# Patient Record
Sex: FEMALE | Race: WHITE | Employment: PART TIME | URBAN - METROPOLITAN AREA
[De-identification: names, ages, dates, MRNs, and addresses within clinical notes are randomized per-mention and may not be internally consistent; named-entity substitution may affect disease eponyms.]

---

## 2017-07-06 ENCOUNTER — OFFICE VISIT (OUTPATIENT)
Dept: ORTHOPEDICS | Facility: CLINIC | Age: 57
End: 2017-07-06

## 2017-07-06 VITALS — BODY MASS INDEX: 24.71 KG/M2 | HEIGHT: 68 IN | RESPIRATION RATE: 16 BRPM | WEIGHT: 163 LBS

## 2017-07-06 DIAGNOSIS — M72.2 PLANTAR FASCIITIS OF RIGHT FOOT: Primary | ICD-10-CM

## 2017-07-06 NOTE — LETTER
7/6/2017      RE: Barby Jensen  [address] [place] [id number]        Subjective:   Barby Jensen is a 57 year old female who is here complaining of   right plantar foot pain for 5 months.  Pain is sharp at the medial heel and initially bad in the am and improved, but she has continued to be active on it and now her symptoms are progressively worse until she has had a tough time putting any weight on the heel and has changed her gait.    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    Background:   Date of injury: NA   Duration of symptoms: 5 months  Mechanism of Injury: Insidious Onset; Unknown   Aggravating factors: standing, walking, sitting to walking  Relieving Factors: ice and NSAIDs  Prior Evaluation: None    PAST MEDICAL, SOCIAL, SURGICAL AND FAMILY HISTORY: She  has a past medical history of Acne rosacea; ASCUS on Pap smear (08/06); and Reactive airway disease.  She  has a past surgical history that includes Excise mass upper extremity (10/09) and Arthroscopy knee bilateral (Bilateral, 9/4/2015).  Her family history includes Arrhythmia in her mother; Arthritis in her father; Depression in her paternal grandmother; Eye Disorder in her father; GASTROINTESTINAL DISEASE in her mother; HEART DISEASE in her mother; Hearing Loss in her father and mother; Heart Failure in her mother; Hypertension in her mother; Osteopenia in her father and mother; Prostate Cancer in her father.  She reports that she has never smoked. She has never used smokeless tobacco. She reports that she drinks alcohol. She reports that she does not use illicit drugs.    ALLERGIES: She has No Known Allergies.    CURRENT MEDICATIONS: She has a current medication list which includes the following prescription(s): estrogen  "(conjugated)-medroxyprogesterone, escitalopram, acetaminophen, metronidazole, sulfacetamide sodium (acne), ibuprofen, multiple vitamin, glucosamine-chondroit-vit c-mn, calcium citrate-vitamin d, and azithromycin.     REVIEW OF SYSTEMS: no numbness or tingling in the heel, no swelling, no trauma     Exam:   Resp 16  Ht 5' 7.9\" (1.725 m)  Wt 163 lb (73.9 kg)  BMI 24.86 kg/m2       CONSTITUTIONAL: healthy, alert and no distress  HEAD: Normocephalic. No masses, lesions, tenderness or abnormalities  SKIN: no suspicious lesions or rashes  GAIT: normal  NEUROLOGIC: Non-focal  PSYCHIATRIC: affect normal/bright and mentation appears normal.    MUSCULOSKELETAL:   R foot  --more of a cavus foot with nl pronation, antalgic gait on R.  --plantar fascia palpated with windlass maneuver and tender from the mid plantar fascia to insertion on the heel. Neg squeeze test at the heel. Perhaps mild tenderness at the midtendon of the achilles  AROM ankle symmetric to L   5/5 inversion, 5/5 eversion, 5/5 dorsiflexion, 5/5 plantarflexion, all without pain  Nl cap refill and sensation  Neg tinnels at medial ankle     Assessment/Plan:   R Plantar Fasciitis    Plan  --given degree of discomfort and difficulty with modifying her environmnent, I recommended a short cam boot to use for a week, removing for ROM 2x daily, as well as ice and relative rest.  -- we discussed not going barefoot in the house.  -- ice, nsaids, acetaminophen  -- heel pad to add in place of 3/4 length orthotic  -- work restriction for sitting 20 min in an hour  -- once pain improved for weightbearing with normal function, estimate 1 week, can start intrinsic foot strengthening exercises  -- could cosider night splints  -- we discussed injection would be for temp pain relief with risk of fat pad atrophy of the heel and I would recommend only if modifying stresses on plantar fascia and not improving.    Denny Stanley MD CAQ    "

## 2017-07-06 NOTE — PROGRESS NOTES
Subjective:   Barby Jensen is a 57 year old female who is here complaining of   right plantar foot pain for 5 months.  Pain is sharp at the medial heel and initially bad in the am and improved, but she has continued to be active on it and now her symptoms are progressively worse until she has had a tough time putting any weight on the heel and has changed her gait.    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    Background:   Date of injury: NA   Duration of symptoms: 5 months  Mechanism of Injury: Insidious Onset; Unknown   Aggravating factors: standing, walking, sitting to walking  Relieving Factors: ice and NSAIDs  Prior Evaluation: None    PAST MEDICAL, SOCIAL, SURGICAL AND FAMILY HISTORY: She  has a past medical history of Acne rosacea; ASCUS on Pap smear (08/06); and Reactive airway disease.  She  has a past surgical history that includes Excise mass upper extremity (10/09) and Arthroscopy knee bilateral (Bilateral, 9/4/2015).  Her family history includes Arrhythmia in her mother; Arthritis in her father; Depression in her paternal grandmother; Eye Disorder in her father; GASTROINTESTINAL DISEASE in her mother; HEART DISEASE in her mother; Hearing Loss in her father and mother; Heart Failure in her mother; Hypertension in her mother; Osteopenia in her father and mother; Prostate Cancer in her father.  She reports that she has never smoked. She has never used smokeless tobacco. She reports that she drinks alcohol. She reports that she does not use illicit drugs.    ALLERGIES: She has No Known Allergies.    CURRENT MEDICATIONS: She has a current medication list which includes the following prescription(s): estrogen (conjugated)-medroxyprogesterone, escitalopram, acetaminophen, metronidazole, sulfacetamide sodium (acne),  "ibuprofen, multiple vitamin, glucosamine-chondroit-vit c-mn, calcium citrate-vitamin d, and azithromycin.     REVIEW OF SYSTEMS: no numbness or tingling in the heel, no swelling, no trauma     Exam:   Resp 16  Ht 5' 7.9\" (1.725 m)  Wt 163 lb (73.9 kg)  BMI 24.86 kg/m2       CONSTITUTIONAL: healthy, alert and no distress  HEAD: Normocephalic. No masses, lesions, tenderness or abnormalities  SKIN: no suspicious lesions or rashes  GAIT: normal  NEUROLOGIC: Non-focal  PSYCHIATRIC: affect normal/bright and mentation appears normal.    MUSCULOSKELETAL:   R foot  --more of a cavus foot with nl pronation, antalgic gait on R.  --plantar fascia palpated with windlass maneuver and tender from the mid plantar fascia to insertion on the heel. Neg squeeze test at the heel. Perhaps mild tenderness at the midtendon of the achilles  AROM ankle symmetric to L   5/5 inversion, 5/5 eversion, 5/5 dorsiflexion, 5/5 plantarflexion, all without pain  Nl cap refill and sensation  Neg tinnels at medial ankle     Assessment/Plan:   R Plantar Fasciitis    Plan  --given degree of discomfort and difficulty with modifying her environmnent, I recommended a short cam boot to use for a week, removing for ROM 2x daily, as well as ice and relative rest.  -- we discussed not going barefoot in the house.  -- ice, nsaids, acetaminophen  -- heel pad to add in place of 3/4 length orthotic  -- work restriction for sitting 20 min in an hour  -- once pain improved for weightbearing with normal function, estimate 1 week, can start intrinsic foot strengthening exercises  -- could cosider night splints  -- we discussed injection would be for temp pain relief with risk of fat pad atrophy of the heel and I would recommend only if modifying stresses on plantar fascia and not improving.    Denny Stanley MD CAQ      "

## 2017-07-06 NOTE — MR AVS SNAPSHOT
After Visit Summary   7/6/2017    Barby Jensen    MRN: [id number]           Patient Information     Date Of Birth          1960        Visit Information        Provider Department      7/6/2017 11:15 AM Denny Stanley MD [facility]        Care Instructions    Plantar Fasciitis  ---this is not an inflammatory condition  --extend the toes and massage the plantar fascia can help pain  --cross train with swimming and biking  --a heel lift or orthotic can help relieve pain  --shoes with minimal support such as flip flops can agrevate the pain  --do not go barefoot in the house     Exercises  --heel cord stretches  --towel exercise--crumple a dishtowel under the feet 3 sets of 10 --5 x a week  --push big toe down while raising 2nd-5th toes hold 10 sec and repeat 3 sets of 10      Denny Stanley MD CAQ            Follow-ups after your visit        Who to contact     Please call your clinic at [phone] to:    Ask questions about your health    Make or cancel appointments    Discuss your medicines    Learn about your test results    Speak to your doctor   If you have compliments or concerns about an experience at your clinic, or if you wish to file a complaint, please contact [facility] Patient Relations at [phone] or email us at [email or web address]         Additional Information About Your Visit        MyChart Information     [employer] gives you secure access to your electronic health record. If you see a primary care provider, you can also send messages to your care team and make appointments. If you have questions, please call your primary care clinic.  If you do not have a primary care provider, please call [phone] and they will assist you.      [employer] is an electronic gateway that provides easy, online access to your medical records. With [employer], you can request a clinic appointment, read your test results, renew a  "prescription or communicate with your care team.     To access your existing account, please contact your [facility] or call [phone] for assistance.        Care EveryWhere ID     This is your Care EveryWhere ID. This could be used by other organizations to access your [place] medical records  [id number]        Your Vitals Were     Respirations Height BMI (Body Mass Index)             16 5' 7.9\" (1.725 m) 24.86 kg/m2          Blood Pressure from Last 3 Encounters:   10/26/16 121/74   09/12/16 106/72   09/04/15 133/71    Weight from Last 3 Encounters:   07/06/17 163 lb (73.9 kg)   10/26/16 162 lb (73.5 kg)   09/12/16 159 lb (72.1 kg)              Today, you had the following     No orders found for display       Primary Care Provider Office Phone # Fax #    Eryn Tessy Caro [phone]       [place] [address]        Equal Access to Services     DEYA ROBERTSON : Hadii micheal ku hadasho Soomaali, waaxda luqadaha, qaybta kaalmada adeegyada, alexa haskins . So [facility] [phone].    ATENCIÓN: Si habla español, tiene a robertson disposición servicios gratuitos de asistencia lingüística. Llame al [phone].    We comply with applicable federal civil rights laws and [place] laws. We do not discriminate on the basis of race, color, national origin, age, disability sex, sexual orientation or gender identity.            Thank you!     Thank you for choosing [facility]  for your care. Our goal is always to provide you with excellent care. Hearing back from our patients is one way we can continue to improve our services. Please take a few minutes to complete the written survey that you may receive in the mail after your visit with us. Thank you!             Your Updated Medication List - Protect others around you: Learn how to safely use, store and throw away your medicines at " www.disposemymeds.org.          This list is accurate as of: 7/6/17 11:50 AM.  Always use your most recent med list.                   Brand Name Dispense Instructions for use Diagnosis    acetaminophen 500 MG tablet    TYLENOL     Take 500-1,000 mg by mouth every 6 hours as needed for mild pain        azithromycin 250 MG tablet    ZITHROMAX    6 tablet    Two tablets first day, then one tablet daily for four days.    Cough, Throat pain       CALCIUM + D PO      Take 1 tablet by mouth daily    Rosacea       escitalopram 10 MG tablet    LEXAPRO    90 tablet    Take 1 tablet (10 mg) by mouth daily    DEAN (generalized anxiety disorder)       estrogen (conjugated)-medroxyPROGESTERone 0.3-1.5 MG per tablet    PREMPRO    90 tablet    Take 1 tablet by mouth daily    Menopausal syndrome (hot flashes)       GLUCOSAMINE 1500 COMPLEX PO      Take by mouth daily    Rosacea       IBUPROFEN PO      Take 200 mg by mouth as needed for moderate pain        metroNIDAZOLE 1 % gel    METROGEL    60 g    Apply topically daily    Rosacea       MV-ONE PO      Take 1 tablet by mouth daily    Rosacea       sulfacetamide sodium (Acne) 10 % lotion    KLARON    118 mL    Externally apply 1 applicator topically daily    Rosacea

## 2017-07-06 NOTE — PATIENT INSTRUCTIONS
Plantar Fasciitis  ---this is not an inflammatory condition  --extend the toes and massage the plantar fascia can help pain  --cross train with swimming and biking  --a heel lift or orthotic can help relieve pain  --shoes with minimal support such as flip flops can agrevate the pain  --do not go barefoot in the house     Exercises  --heel cord stretches  --towel exercise--crumple a dishtowel under the feet 3 sets of 10 --5 x a week  --push big toe down while raising 2nd-5th toes hold 10 sec and repeat 3 sets of 10      Denny Stanley MD CAQ

## 2017-07-11 DIAGNOSIS — F41.1 GAD (GENERALIZED ANXIETY DISORDER): ICD-10-CM

## 2017-07-11 RX ORDER — ESCITALOPRAM OXALATE 10 MG/1
10 TABLET ORAL DAILY
Qty: 30 TABLET | Refills: 0 | Status: SHIPPED | OUTPATIENT
Start: 2017-07-11 | End: 2017-08-23

## 2017-07-11 NOTE — TELEPHONE ENCOUNTER
Medication requested: Lexapro 10 mg tabs  Last refilled: 3-27-17  Qty: 90      Last seen: 4-7-16  RTC: 1 year  Cancel: 0  No-show: 0  Next appt: none    Refill decision: 30 day sigrid refill due to no scheduled appointment sent to the pharmacy - including instructions for patient to call the clinic .and schedule an appointment.      Kathleen M Doege RN

## 2017-07-15 ENCOUNTER — HEALTH MAINTENANCE LETTER (OUTPATIENT)
Age: 57
End: 2017-07-15

## 2017-08-23 DIAGNOSIS — F41.1 GAD (GENERALIZED ANXIETY DISORDER): ICD-10-CM

## 2017-08-23 NOTE — TELEPHONE ENCOUNTER
Routing refill request to provider for review/approval because:  Patient needs to be seen because:  Needs updated PHQ9 and visit by Oct, with Dr Caro.  LM for pt re: this.

## 2017-08-24 RX ORDER — ESCITALOPRAM OXALATE 10 MG/1
10 TABLET ORAL DAILY
Qty: 90 TABLET | Refills: 0 | Status: SHIPPED | OUTPATIENT
Start: 2017-08-24 | End: 2017-11-08

## 2017-10-31 NOTE — TELEPHONE ENCOUNTER
APPT INFO    Date /Time: 11/8/17 1:20pm   Reason for Appt: Plantar Fasciitis   Ref Provider/Clinic: Dr. Stanley   Are there internal records? If yes, list: Yes - [facility] ([facility] in [facility])   Patient Contact (Y/N) & Call Details: No - internal referral/records   Action: --

## 2017-11-08 ENCOUNTER — OFFICE VISIT (OUTPATIENT)
Dept: ORTHOPEDICS | Facility: CLINIC | Age: 57
End: 2017-11-08

## 2017-11-08 ENCOUNTER — PRE VISIT (OUTPATIENT)
Dept: ORTHOPEDICS | Facility: CLINIC | Age: 57
End: 2017-11-08

## 2017-11-08 ENCOUNTER — OFFICE VISIT (OUTPATIENT)
Dept: FAMILY MEDICINE | Facility: CLINIC | Age: 57
End: 2017-11-08

## 2017-11-08 VITALS
DIASTOLIC BLOOD PRESSURE: 68 MMHG | HEART RATE: 71 BPM | OXYGEN SATURATION: 100 % | BODY MASS INDEX: 22.88 KG/M2 | SYSTOLIC BLOOD PRESSURE: 103 MMHG | TEMPERATURE: 97.4 F | WEIGHT: 150.04 LBS

## 2017-11-08 DIAGNOSIS — F41.1 GAD (GENERALIZED ANXIETY DISORDER): ICD-10-CM

## 2017-11-08 DIAGNOSIS — L71.9 ROSACEA: ICD-10-CM

## 2017-11-08 DIAGNOSIS — Z13.820 SCREENING FOR OSTEOPOROSIS: Primary | ICD-10-CM

## 2017-11-08 DIAGNOSIS — M79.671 PAIN OF RIGHT HEEL: ICD-10-CM

## 2017-11-08 DIAGNOSIS — N95.1 MENOPAUSAL SYNDROME (HOT FLASHES): ICD-10-CM

## 2017-11-08 DIAGNOSIS — M72.2 PLANTAR FASCIITIS: Primary | ICD-10-CM

## 2017-11-08 RX ORDER — METRONIDAZOLE 10 MG/G
GEL TOPICAL DAILY
Qty: 60 G | Refills: 3 | Status: SHIPPED | OUTPATIENT
Start: 2017-11-08 | End: 2019-08-22

## 2017-11-08 RX ORDER — ESCITALOPRAM OXALATE 10 MG/1
10 TABLET ORAL DAILY
Qty: 90 TABLET | Refills: 0 | Status: SHIPPED | OUTPATIENT
Start: 2017-11-08 | End: 2018-03-08

## 2017-11-08 RX ORDER — NORETHINDRONE ACETATE AND ETHINYL ESTRADIOL 1; 5 MG/1; UG/1
1 TABLET ORAL DAILY
Qty: 84 TABLET | Refills: 3 | Status: SHIPPED | OUTPATIENT
Start: 2017-11-08 | End: 2018-11-26

## 2017-11-08 RX ORDER — SULFACETAMIDE SODIUM 100 MG/ML
LOTION TOPICAL
Qty: 118 ML | Refills: 3 | Status: SHIPPED | OUTPATIENT
Start: 2017-11-08 | End: 2020-06-03

## 2017-11-08 ASSESSMENT — ENCOUNTER SYMPTOMS: ARTHRALGIAS: 1

## 2017-11-08 ASSESSMENT — PAIN SCALES - GENERAL: PAINLEVEL: SEVERE PAIN (6)

## 2017-11-08 NOTE — MR AVS SNAPSHOT
After Visit Summary   11/8/2017    Barby Jensen    MRN: [id number]           Patient Information     Date Of Birth          1960        Visit Information        Provider Department      11/8/2017 11:00 AM Eryn Caro MD [facility]        Today's Diagnoses     Screening for osteoporosis    -  1    Menopausal syndrome (hot flashes)        DEAN (generalized anxiety disorder)        Rosacea          Care Instructions    Fem HRT or generic equilavent    CALCIUM  Recommendations:  Teenagers, men and premenopausal women: 1200 mg/day  Pregnant and Lactating women: 1500 mg/day  Postmenopausal women on estrogen: 1200mg/day  Postmenopausal women not on estrogen: 1500 mg/day    If you are not eating dairy products you also need 1000 IU of vitamin D per day which can be obtained in either a multivitamin or in some of the Calcium tablets.    Food sources give % per serving  30% is 300mg for example    Dairy sources  Milk-  cow's              8 oz            300 mg  [place] milk             8 oz            450mg  Yogurt                      6 oz            300mg  Hard cheese                     1.5 oz          300 mg  Cottage cheese                  2 cup           300 mg  Low fat dairy sources are recommended    Non Dairy sources  OJ with calcium               1 cup           300mg  Total cereal                     1 cup           1000mg    Supplements:  Tums EX                         300 mg  Tums Ultra                      400 mg  Caltrate 600                    600 mg  Oscal                           500 mg  Oscal/D                         500 mg plus vitamin D  Viactive chews                  500mg each  Women's Formula Multivitamin    450 mg              Follow-ups after your visit        Your next 10 appointments already scheduled     Nov 08, 2017  1:00 PM CST   (Arrive by 12:45 PM)   MA SCREENING BILATERAL W/ EARLENE with [id number]   [facility] ([facility])    [address] [place] [address]  2nd [facility] [id number] [phone]           Three-dimensional (3D) mammograms are available at [place] locations in [facility], [place], [place], [facility], [place], [place], and [place]. [facility] locations include [facility] in [place]. Benefits of 3D mammograms include: - Improved rate of cancer detection - Decreases your chance of having to go back for more tests, which means fewer: - \"False-positive\" results (This means that there is an abnormal area but it isn't cancer.) - Invasive testing procedures, such as a biopsy or surgery - Can provide clearer images of the breast if you have dense breast tissue. 3D mammography is an optional exam that anyone can have with a 2D mammogram. It doesn't replace or take the place of a 2D mammogram. 2D mammograms remain an effective screening test for all women.  Not all insurance companies cover the cost of a 3D mammogram. Check with your insurance.            Nov 08, 2017  1:20 PM CST   (Arrive by 1:05 PM)   NEW FOOT/ANKLE with Saul Perez DPM   [facility] ([facility])    [address] [place] [id number] [phone]              Future tests that were ordered for you today     Open Future Orders        Priority Expected Expires Ordered    Dexa hip/pelvis/spine* Routine  11/8/2018 11/8/2017            Who to contact     Please call your clinic at [phone] to:    Ask questions about your health    Make or cancel appointments    Discuss your medicines    Learn about your test results    Speak to your doctor   If you have compliments or concerns about an experience at your clinic, or if you wish to file a complaint, please contact [facility] Patient Relations at [phone] or email us at [email or web address]         Additional Information About " Your Visit        [employer]RD Communication[employer] Information     [email or web address] gives you secure access to your electronic health record. If you see a primary care provider, you can also send messages to your care team and make appointments. If you have questions, please call your primary care clinic.  If you do not have a primary care provider, please call [phone] and they will assist you.      [employer] is an electronic gateway that provides easy, online access to your medical records. With [email or web address], you can request a clinic appointment, read your test results, renew a prescription or communicate with your care team.     To access your existing account, please contact your [facility] or call [phone] for assistance.        Care EveryWhere ID     This is your Care EveryWhere ID. This could be used by other organizations to access your [place] medical records  [id number]        Your Vitals Were     Pulse Temperature Pulse Oximetry BMI (Body Mass Index)          71 97.4  F (36.3  C) (Oral) 100% 22.88 kg/m2         Blood Pressure from Last 3 Encounters:   11/08/17 103/68   10/26/16 121/74   09/12/16 106/72    Weight from Last 3 Encounters:   11/08/17 150 lb 0.6 oz (68.1 kg)   07/06/17 163 lb (73.9 kg)   10/26/16 162 lb (73.5 kg)                 Today's Medication Changes          These changes are accurate as of: 11/8/17 11:44 AM.  If you have any questions, ask your nurse or doctor.               Start taking these medicines.        Dose/Directions    norethindrone-ethinyl estradiol 1-5 MG-MCG per tablet   Commonly known as:  FEMHRT 1/5   Used for:  Menopausal syndrome (hot flashes)   Started by:  Eryn Caro MD        Dose:  1 tablet   Take 1 tablet by mouth daily   Quantity:  84 tablet   Refills:  3         These medicines have changed or have updated prescriptions.        Dose/Directions    sulfacetamide sodium (Acne) 10 % lotion   Commonly known as:  KLARON   This may have changed:    - how  much to take  - how to take this  - when to take this  - additional instructions   Used for:  Rosacea   Changed by:  Eryn Caro MD        Apply once at night   Quantity:  118 mL   Refills:  3            Where to get your medicines      These medications were sent to [facility] [place] [place] - [address]  [address], [facility] [address]    Hours:  TRANSPLANT PHONE NUMBER [phone] Phone:  [phone]     escitalopram 10 MG tablet    metroNIDAZOLE 1 % gel    norethindrone-ethinyl estradiol 1-5 MG-MCG per tablet    sulfacetamide sodium (Acne) 10 % lotion                Primary Care Provider Office Phone # Fax #    Eryn Caro [phone]       [address] [place] [address]        Equal Access to Services     JEANETTE ROBERTSON : Hadii micheal smith hadasho Soomaali, waaxda luqadaha, qaybta kaalmada adeegyada, alexa morales haygeorgina haskins . So [facility] [phone].    ATENCIÓN: Si habla español, tiene a robertson disposición servicios gratuitos de asistencia lingüística. Topher al [phone].    We comply with applicable federal civil rights laws and [place] laws. We do not discriminate on the basis of race, color, national origin, age, disability, sex, sexual orientation, or gender identity.            Thank you!     Thank you for choosing [facility]  for your care. Our goal is always to provide you with excellent care. Hearing back from our patients is one way we can continue to improve our services. Please take a few minutes to complete the written survey that you may receive in the mail after your visit with us. Thank you!             Your Updated Medication List - Protect others around you: Learn how to safely use, store and throw away your medicines at www.disposemymeds.org.          This list is accurate as of: 11/8/17 11:44 AM.  Always use your most recent med list.                   Brand Name  Dispense Instructions for use Diagnosis    acetaminophen 500 MG tablet    TYLENOL     Take 500-1,000 mg by mouth every 6 hours as needed for mild pain        CALCIUM + D PO      Take 1 tablet by mouth daily    Rosacea       escitalopram 10 MG tablet    LEXAPRO    90 tablet    Take 1 tablet (10 mg) by mouth daily    DEAN (generalized anxiety disorder)       estrogen (conjugated)-medroxyPROGESTERone 0.3-1.5 MG per tablet    PREMPRO    90 tablet    Take 1 tablet by mouth daily    Menopausal syndrome (hot flashes)       GLUCOSAMINE 1500 COMPLEX PO      Take by mouth daily    Rosacea       IBUPROFEN PO      Take 200 mg by mouth as needed for moderate pain        metroNIDAZOLE 1 % gel    METROGEL    60 g    Apply topically daily    Rosacea       MV-ONE PO      Take 1 tablet by mouth daily    Rosacea       norethindrone-ethinyl estradiol 1-5 MG-MCG per tablet    FEMHRT 1/5    84 tablet    Take 1 tablet by mouth daily    Menopausal syndrome (hot flashes)       sulfacetamide sodium (Acne) 10 % lotion    KLARON    118 mL    Apply once at night    Rosacea

## 2017-11-08 NOTE — NURSING NOTE
[facility]TH[facility]INIC  [address] [place] [address]  Dept: [phone]  ______________________________________________________________________________    Patient: Barby Jensen   : 1960   MRN: [id number]   2017    INVASIVE PROCEDURE SAFETY CHECKLIST    Date: 2017   Procedure: right heel injection.   Patient Name: Barby Jensen  MRN: [id number]  YOB: 1960    Action: Complete sections as appropriate. Any discrepancy results in a HARD COPY until resolved.     PRE PROCEDURE:  Patient ID verified with 2 identifiers (name and  or MRN): Yes  Procedure and site verified with patient/designee (when able): Yes  Accurate consent documentation in medical record: Yes  H&P (or appropriate assessment) documented in medical record: Yes  H&P must be up to 20 days prior to procedure and updates within 24 hours of procedure as applicable: NA  Relevant diagnostic and radiology test results appropriately labeled and displayed as applicable: Yes  Procedure site(s) marked with provider initials: NA    TIMEOUT:  Time-Out performed immediately prior to starting procedure, including verbal and active participation of all team members addressing the following:Yes  * Correct patient identify  * Confirmed that the correct side and site are marked  * An accurate procedure consent form  * Agreement on the procedure to be done  * Correct patient position  * Relevant images and results are properly labeled and appropriately displayed  * The need to administer antibiotics or fluids for irrigation purposes during the procedure as applicable   * Safety precautions based on patient history or medication use    DURING PROCEDURE: Verification of correct person, site, and procedures any time the responsibility for care of the patient is transferred to another member of the care team.       The following medications were given:     MEDICATION:  Lidocaine without  epinephrine  ROUTE: Intra articular  SITE: Right heel  DOSE: 200 mg per 20 ml  LOT #: [id number]  : Axela  EXPIRATION DATE: 07/21  NDC#: 48218-022-78   Was there drug waste? Yes  Amount of drug waste (mL): 19.  Reason for waste:  Single use vial    MEDICATION:  Kenalog 40 mg  ROUTE: Intra articular  SITE: Right Heel  DOSE: 40 mg per 1 ml  LOT #: [id number]  : [employer]  EXPIRATION DATE: FEB2019  NDC#: 1858-1385-08   Was there drug waste? No  MEDICATION:  Dexamethasone  ROUTE: Intra articular  SITE: Right heel  DOSE: 4 mg per ml  LOT #: [id number]  : Axela  EXPIRATION DATE: 08/18  NDC#: 59102-167-08   Was there drug waste? No      Marie Shine LPN  November 8, 2017

## 2017-11-08 NOTE — NURSING NOTE
57 year old  Chief Complaint   Patient presents with     Refill Request       Blood pressure 103/68, pulse 71, temperature 97.4  F (36.3  C), temperature source Oral, weight 150 lb 0.6 oz (68.1 kg), SpO2 100 %. Body mass index is 22.88 kg/(m^2).  Patient Active Problem List   Diagnosis     CARDIOVASCULAR SCREENING; LDL GOAL LESS THAN 130     DEAN (generalized anxiety disorder)     Rosacea     Chronic pain of both knees     Hot flashes       Wt Readings from Last 2 Encounters:   11/08/17 150 lb 0.6 oz (68.1 kg)   07/06/17 163 lb (73.9 kg)     BP Readings from Last 3 Encounters:   11/08/17 103/68   10/26/16 121/74   09/12/16 106/72         Current Outpatient Prescriptions   Medication     escitalopram (LEXAPRO) 10 MG tablet     estrogen, conjugated,-medroxyPROGESTERone (PREMPRO) 0.3-1.5 MG per tablet     acetaminophen (TYLENOL) 500 MG tablet     metroNIDAZOLE (METROGEL) 1 % gel     sulfacetamide sodium, Acne, (KLARON) 10 % LOTN     IBUPROFEN PO     Multiple Vitamin (MV-ONE PO)     Glucosamine-Chondroit-Vit C-Mn (GLUCOSAMINE 1500 COMPLEX PO)     Calcium-Vitamin D (CALCIUM + D PO)     No current facility-administered medications for this visit.        Social History   Substance Use Topics     Smoking status: Never Smoker     Smokeless tobacco: Never Used     Alcohol use Yes      Comment: 3/wk       Health Maintenance Due   Topic Date Due     PHQ-9 Q1 MONTH  11/26/2016     PAP Q3 YR  03/26/2017     INFLUENZA VACCINE (SYSTEM ASSIGNED)  09/01/2017     DEAN QUESTIONNAIRE 1 YEAR  09/12/2017       Lab Results   Component Value Date    PAP NIL 03/26/2014         Carmelina Castelan CMA  November 8, 2017 11:09 AM

## 2017-11-08 NOTE — PROGRESS NOTES
Date of Service: 11/8/2017    Chief Complaint:   Chief Complaint   Patient presents with     Consult     Plantar fasciitis, right foot. Pt stated that she has seen Dr. Stanley and he gave her suggestions but she would perefer to see a podiatrist.         HPI: Barby is a 57 year old female who presents today for further evaluation of right hand fasciitis. She has seen our sports medicine doctors upstairs. She has tried multiple conservative options for this pain. The pain has been present since February. She was starting to feel little better in the summer, however it has returned.  Nature: Sharp and burning    Location: right medial heel    Duration: 8 months    Onset: gradual    Course: stable over the last few months    Aggravating/alleviating factors: walking aggravates. + post-static dyskinesia.     Previous Treatments: Over-the-counter orthotics, over-the-counter night splint, stretching, NSAIDs, massage, change in shoes.      Review of Systems: Answers for HPI/ROS submitted by the patient on 11/8/2017   General Symptoms: No  Skin Symptoms: No  HENT Symptoms: No  EYE SYMPTOMS: No  HEART SYMPTOMS: No  LUNG SYMPTOMS: No  INTESTINAL SYMPTOMS: No  URINARY SYMPTOMS: No  GYNECOLOGIC SYMPTOMS: No  BREAST SYMPTOMS: No  SKELETAL SYMPTOMS: Yes  BLOOD SYMPTOMS: No  NERVOUS SYSTEM SYMPTOMS: No  MENTAL HEALTH SYMPTOMS: No  Joint pain: Yes    PMH:   Past Medical History:   Diagnosis Date     Acne rosacea      ASCUS on Pap smear 08/06     Reactive airway disease        PSxH:   Past Surgical History:   Procedure Laterality Date     ARTHROSCOPY KNEE BILATERAL Bilateral 9/4/2015    Procedure: ARTHROSCOPY KNEE BILATERAL;  Surgeon: Tracie Stewart MD;  [facility]     EXCISE MASS UPPER EXTREMITY  10/09    right forearm lipoma     KNEE SURGERY  9/2015    Bilateral meniscectomies       Allergies: Review of patient's allergies indicates no known allergies.    SH:   Social History     Social History     Marital status:       Spouse name: Luiz      Number of children: 2     Years of education: N/A     Occupational History     RN      [employer]     Social History Main Topics     Smoking status: Never Smoker     Smokeless tobacco: Never Used     Alcohol use Yes      Comment: 3 glasses wine per week     Drug use: No     Sexual activity: Yes     Partners: Male     Birth control/ protection: Post-menopausal     Other Topics Concern     Parent/Sibling W/ Cabg, Mi Or Angioplasty Before 65f 55m? No     Social History Narrative       FH:   Family History   Problem Relation Age of Onset     GASTROINTESTINAL DISEASE Mother      HEART DISEASE Mother      pacemaker     Hearing Loss Mother      Hypertension Mother      Heart Failure Mother      Arrhythmia Mother      Osteopenia Mother      Hearing Loss Father      Arthritis Father      Prostate Cancer Father      Eye Disorder Father      macular degeneration     Osteopenia Father      after tx for prostate cancer     Depression Paternal Grandmother        Objective:      PT and DP pulses are 2/4 bilaterally. CRT is 3 seconds. Positive pedal hair.   Gross sensation is intact bilaterally.   Equinus is mild bilaterally. No pain with active or passive ROM of the ankle, MTJ, 1st ray, or halluces bilaterally. Pain noted in a trigger point at the medial origin of the plantar fascia on the right foot.  Some pain noted along the tensed band of the plantar fascia on the right. No pain noted along the courses of the PT, peroneal, or Achilles tendons. MMT 5/5 on the right.   Nails normal bilaterally. No open lesions are noted.     Assessment: Right plantar fasciitis - failed multiple conservative treatments.     Plan:  - Pt seen and evaluated.  - I discussed injection therapy with Yoanna, as she has had multiple treatments. She would like to try an injection. I discussed the risks, complications, benefits, alternatives, and answered all of her questions. Consent was signed. After skin  prep with Chloraprep, an injection consisting of 1cc lidocaine 1% plain + 1cc Kenalog-40 + 1cc dexamethasone 4mg was injected into the right heel trigger point. She tolerated this well with no complications.   - Dispensed a night splint for her to wear.   - Yoanna would benefit from a pair of CMOs. I have molded her for these.   - See again in 2 months.

## 2017-11-08 NOTE — LETTER
11/8/2017       RE: Barby Jensen  [address] [address]     Dear Colleague,    Thank you for referring your patient, Barby Jensen, to the [facility] at [facility]. Please see a copy of my visit note below.    Date of Service: 11/8/2017    Chief Complaint:   Chief Complaint   Patient presents with     Consult     Plantar fasciitis, right foot. Pt stated that she has seen Dr. Stanley and he gave her suggestions but she would perefer to see a podiatrist.         HPI: Barby is a 57 year old female who presents today for further evaluation of right hand fasciitis. She has seen our sports medicine doctors upstairs. She has tried multiple conservative options for this pain. The pain has been present since February. She was starting to feel little better in the summer, however it has returned.  Nature: Sharp and burning    Location: right medial heel    Duration: 8 months    Onset: gradual    Course: stable over the last few months    Aggravating/alleviating factors: walking aggravates. + post-static dyskinesia.     Previous Treatments: Over-the-counter orthotics, over-the-counter night splint, stretching, NSAIDs, massage, change in shoes.      Review of Systems:     PMH:   Past Medical History:   Diagnosis Date     Acne rosacea      ASCUS on Pap smear 08/06     Reactive airway disease        PSxH:   Past Surgical History:   Procedure Laterality Date     ARTHROSCOPY KNEE BILATERAL Bilateral 9/4/2015    Procedure: ARTHROSCOPY KNEE BILATERAL;  Surgeon: Tracie Stewart MD;  [facility]     EXCISE MASS UPPER EXTREMITY  10/09    right forearm lipoma     KNEE SURGERY  9/2015    Bilateral meniscectomies       Allergies: Review of patient's allergies indicates no known allergies.    SH:   Social History     Social History     Marital status:      Spouse name: Luiz      Number of children: 2     Years of education: N/A     Occupational  History     RN      [employer]     Social History Main Topics     Smoking status: Never Smoker     Smokeless tobacco: Never Used     Alcohol use Yes      Comment: 3 glasses wine per week     Drug use: No     Sexual activity: Yes     Partners: Male     Birth control/ protection: Post-menopausal     Other Topics Concern     Parent/Sibling W/ Cabg, Mi Or Angioplasty Before 65f 55m? No     Social History Narrative       FH:   Family History   Problem Relation Age of Onset     GASTROINTESTINAL DISEASE Mother      HEART DISEASE Mother      pacemaker     Hearing Loss Mother      Hypertension Mother      Heart Failure Mother      Arrhythmia Mother      Osteopenia Mother      Hearing Loss Father      Arthritis Father      Prostate Cancer Father      Eye Disorder Father      macular degeneration     Osteopenia Father      after tx for prostate cancer     Depression Paternal Grandmother        Objective:      PT and DP pulses are 2/4 bilaterally. CRT is 3 seconds. Positive pedal hair.   Gross sensation is intact bilaterally.   Equinus is mild bilaterally. No pain with active or passive ROM of the ankle, MTJ, 1st ray, or halluces bilaterally. Pain noted in a trigger point at the medial origin of the plantar fascia on the right foot.  Some pain noted along the tensed band of the plantar fascia on the right. No pain noted along the courses of the PT, peroneal, or Achilles tendons. MMT 5/5 on the right.   Nails normal bilaterally. No open lesions are noted.     Assessment: Right plantar fasciitis - failed multiple conservative treatments.     Plan:  - Pt seen and evaluated.  - I discussed injection therapy with Yoanna, as she has had multiple treatments. She would like to try an injection. I discussed the risks, complications, benefits, alternatives, and answered all of her questions. Consent was signed. After skin prep with Chloraprep, an injection consisting of 1cc lidocaine 1% plain + 1cc Kenalog-40 + 1cc  dexamethasone 4mg was injected into the right heel trigger point. She tolerated this well with no complications.   - Dispensed a night splint for her to wear.   - Yoanna would benefit from a pair of CMOs. I have molded her for these.   - See again in 2 months.       Again, thank you for allowing me to participate in the care of your patient.      Sincerely,    Saul Perez DPM

## 2017-11-08 NOTE — NURSING NOTE
Reason For Visit:   Chief Complaint   Patient presents with     Consult     Plantar fasciitis, right foot. Pt stated that she has seen Dr. Stanley and he gave her suggestions but she would perefer to see a podiatrist.          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 Pt stated that she works 4 10 hour shifts.  Pain Assessment  Patient Currently in Pain: Yes  0-10 Pain Scale: 6  Primary Pain Location: Foot  Pain Orientation: Right  Alleviating Factors:  (Pt stated that she has tried aleve and tylenol and that did not seem to help. )  Aggravating Factors: Walking, Standing, Sitting (For long periods. )               Current Outpatient Prescriptions   Medication Sig Dispense Refill     norethindrone-ethinyl estradiol (FEMHRT 1/5) 1-5 MG-MCG per tablet Take 1 tablet by mouth daily 84 tablet 3     escitalopram (LEXAPRO) 10 MG tablet Take 1 tablet (10 mg) by mouth daily 90 tablet 0     metroNIDAZOLE (METROGEL) 1 % gel Apply topically daily 60 g 3     sulfacetamide sodium, Acne, (KLARON) 10 % lotion Apply once at night 118 mL 3     [DISCONTINUED] escitalopram (LEXAPRO) 10 MG tablet Take 1 tablet (10 mg) by mouth daily 90 tablet 0     estrogen, conjugated,-medroxyPROGESTERone (PREMPRO) 0.3-1.5 MG per tablet Take 1 tablet by mouth daily 90 tablet 3     acetaminophen (TYLENOL) 500 MG tablet Take 500-1,000 mg by mouth every 6 hours as needed for mild pain       IBUPROFEN PO Take 200 mg by mouth as needed for moderate pain        Multiple Vitamin (MV-ONE PO) Take 1 tablet by mouth daily        Glucosamine-Chondroit-Vit C-Mn (GLUCOSAMINE 1500 COMPLEX PO) Take by mouth daily        Calcium-Vitamin D (CALCIUM + D PO) Take 1 tablet by mouth daily           No Known Allergies

## 2017-11-08 NOTE — MR AVS SNAPSHOT
After Visit Summary   11/8/2017    Barby Jensen    MRN: [id number]           Patient Information     Date Of Birth          1960        Visit Information        Provider Department      11/8/2017 1:20 PM Saul Perez DPM [facility]        Today's Diagnoses     Plantar fasciitis    -  1    Pain of right heel           Follow-ups after your visit        Additional Services     ORTHOTICS REFERRAL (Foot and Ankle)       Please be aware that coverage of these services is subject to the terms and limitations of your health insurance plan.  Call member services at your health plan with any benefit or coverage questions.      Please bring the following to your appointment:    >>   Any x-rays, CTs or MRIs which have been performed.  Contact the facility where they were done to arrange for  prior to your scheduled appointment.  Any new CT, MRI or other procedures ordered by your specialist must be performed at a [facility] facility or coordinated by your clinic's referral office.    >>   List of current medications   >>   This referral request   >>   Any documents/labs given to you for this referral    ==This Referral PRINTS in the [facility] (ORTHOTICS & PROSTHETICS) Central scheduling office ==     The [facility] Central Scheduling staff will contact patient to arrange appointments. Central Scheduling Phone #:  [place], [place]  [phone]     Orthotics: Foot Orthotics    FOOT AND ANKLE ORTHOTIC PRESCRIPTION:  Full Length Foot Orthotic:  Hole in left heel with depression for PF.                  Your next 10 appointments already scheduled     Jan 09, 2018  6:00 PM CST   (Arrive by 5:45 PM)   RETURN FOOT/ANKLE with [facility] ([facility])    [address] [place] [address] [phone]              Who to contact     Please call your clinic at  [phone] to:    Ask questions about your health    Make or cancel appointments    Discuss your medicines    Learn about your test results    Speak to your doctor   If you have compliments or concerns about an experience at your clinic, or if you wish to file a complaint, please contact [facility] Patient Relations at [phone] or email us at [email or web address]         Additional Information About Your Visit        [employer] Information     [employer] gives you secure access to your electronic health record. If you see a primary care provider, you can also send messages to your care team and make appointments. If you have questions, please call your primary care clinic.  If you do not have a primary care provider, please call [phone] and they will assist you.      [employer] is an electronic gateway that provides easy, online access to your medical records. With [employer], you can request a clinic appointment, read your test results, renew a prescription or communicate with your care team.     To access your existing account, please contact your [facility] or call [phone] for assistance.        Care EveryWhere ID     This is your Care EveryWhere ID. This could be used by other organizations to access your [facility] medical records  [id number]         Blood Pressure from Last 3 Encounters:   11/08/17 103/68   10/26/16 121/74   09/12/16 106/72    Weight from Last 3 Encounters:   11/08/17 68.1 kg (150 lb 0.6 oz)   07/06/17 73.9 kg (163 lb)   10/26/16 73.5 kg (162 lb)              We Performed the Following     DRAIN/INJECT SMALL JOINT/BURSA (Finger, Heel)     ORTHOTICS REFERRAL (Foot and Ankle)          Today's Medication Changes          These changes are accurate as of: 11/8/17 11:59 PM.  If you have any questions, ask your nurse or doctor.               Start taking these medicines.        Dose/Directions    norethindrone-ethinyl estradiol  1-5 MG-MCG per tablet   Commonly known as:  FEMHRT 1/5   Used for:  Menopausal syndrome (hot flashes)   Started by:  Eryn Caro MD        Dose:  1 tablet   Take 1 tablet by mouth daily   Quantity:  84 tablet   Refills:  3         These medicines have changed or have updated prescriptions.        Dose/Directions    sulfacetamide sodium (Acne) 10 % lotion   Commonly known as:  KLARON   This may have changed:    - how much to take  - how to take this  - when to take this  - additional instructions   Used for:  Rosacea   Changed by:  Eryn Caro MD        Apply once at night   Quantity:  118 mL   Refills:  3            Where to get your medicines      These medications were sent to [address]  [address] [place] [address] [address] [address]    Hours:  TRANSPLANT PHONE NUMBER [phone] Phone:  [phone]     escitalopram 10 MG tablet    metroNIDAZOLE 1 % gel    norethindrone-ethinyl estradiol 1-5 MG-MCG per tablet    sulfacetamide sodium (Acne) 10 % lotion                Primary Care Provider Office Phone # Fax #    Eryn Caro [phone]       [address] [place] [address]        Equal Access to Services     JEANETTE ROBERTSON AH: Gwendolyn fregosoo Soelvia, waaxda luqadaha, qaybta kaalmada adeegyada, alexa bacon. So [facility] [phone].    ATENCIÓN: Si habla español, tiene a robertson disposición servicios gratuitos de asistencia lingüística. Llame al [phone].    We comply with applicable federal civil rights laws and [place] laws. We do not discriminate on the basis of race, color, national origin, age, disability, sex, sexual orientation, or gender identity.            Thank you!     Thank you for choosing [facility]  for your care. Our goal is always to provide you with excellent care. Hearing back from our patients is one way we can  continue to improve our services. Please take a few minutes to complete the written survey that you may receive in the mail after your visit with us. Thank you!             Your Updated Medication List - Protect others around you: Learn how to safely use, store and throw away your medicines at www.disposemymeds.org.          This list is accurate as of: 11/8/17 11:59 PM.  Always use your most recent med list.                   Brand Name Dispense Instructions for use Diagnosis    acetaminophen 500 MG tablet    TYLENOL     Take 500-1,000 mg by mouth every 6 hours as needed for mild pain        CALCIUM + D PO      Take 1 tablet by mouth daily    Rosacea       escitalopram 10 MG tablet    LEXAPRO    90 tablet    Take 1 tablet (10 mg) by mouth daily    DEAN (generalized anxiety disorder)       estrogen (conjugated)-medroxyPROGESTERone 0.3-1.5 MG per tablet    PREMPRO    90 tablet    Take 1 tablet by mouth daily    Menopausal syndrome (hot flashes)       GLUCOSAMINE 1500 COMPLEX PO      Take by mouth daily    Rosacea       IBUPROFEN PO      Take 200 mg by mouth as needed for moderate pain        metroNIDAZOLE 1 % gel    METROGEL    60 g    Apply topically daily    Rosacea       MV-ONE PO      Take 1 tablet by mouth daily    Rosacea       norethindrone-ethinyl estradiol 1-5 MG-MCG per tablet    FEMHRT 1/5    84 tablet    Take 1 tablet by mouth daily    Menopausal syndrome (hot flashes)       sulfacetamide sodium (Acne) 10 % lotion    KLARON    118 mL    Apply once at night    Rosacea

## 2017-11-08 NOTE — PATIENT INSTRUCTIONS
Fem HRT or generic equilavent    CALCIUM  Recommendations:  Teenagers, men and premenopausal women: 1200 mg/day  Pregnant and Lactating women: 1500 mg/day  Postmenopausal women on estrogen: 1200mg/day  Postmenopausal women not on estrogen: 1500 mg/day    If you are not eating dairy products you also need 1000 IU of vitamin D per day which can be obtained in either a multivitamin or in some of the Calcium tablets.    Food sources give % per serving  30% is 300mg for example    Dairy sources  Milk-  cow's              8 oz            300 mg  Bartow milk             8 oz            450mg  Yogurt                      6 oz            300mg  Hard cheese                     1.5 oz          300 mg  Cottage cheese                  2 cup           300 mg  Low fat dairy sources are recommended    Non Dairy sources  OJ with calcium               1 cup           300mg  Total cereal                     1 cup           1000mg    Supplements:  Tums EX                         300 mg  Tums Ultra                      400 mg  Caltrate 600                    600 mg  Oscal                           500 mg  Oscal/D                         500 mg plus vitamin D  Viactive chews                  500mg each  Women's Formula Multivitamin    450 mg

## 2017-11-09 ASSESSMENT — PATIENT HEALTH QUESTIONNAIRE - PHQ9
SUM OF ALL RESPONSES TO PHQ QUESTIONS 1-9: 0
5. POOR APPETITE OR OVEREATING: SEVERAL DAYS

## 2017-11-09 ASSESSMENT — ANXIETY QUESTIONNAIRES
3. WORRYING TOO MUCH ABOUT DIFFERENT THINGS: SEVERAL DAYS
IF YOU CHECKED OFF ANY PROBLEMS ON THIS QUESTIONNAIRE, HOW DIFFICULT HAVE THESE PROBLEMS MADE IT FOR YOU TO DO YOUR WORK, TAKE CARE OF THINGS AT HOME, OR GET ALONG WITH OTHER PEOPLE: NOT DIFFICULT AT ALL
7. FEELING AFRAID AS IF SOMETHING AWFUL MIGHT HAPPEN: NOT AT ALL
1. FEELING NERVOUS, ANXIOUS, OR ON EDGE: SEVERAL DAYS
GAD7 TOTAL SCORE: 5
6. BECOMING EASILY ANNOYED OR IRRITABLE: SEVERAL DAYS
2. NOT BEING ABLE TO STOP OR CONTROL WORRYING: SEVERAL DAYS
5. BEING SO RESTLESS THAT IT IS HARD TO SIT STILL: NOT AT ALL

## 2017-11-09 NOTE — PROGRESS NOTES
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 She is here to request medication refill, HRT. She is on Prempro, which has worked very well for her hot flashes. She denies any breakthrough symptoms. No vaginal bleeding. She is going to have her mammogram today. Her only concern is the constant medication. She is asking if there is a less expensive option.   She is not a smoke. Discussed option of trying FemHRT to see if this would be more affordable.    DEAN (generalized anxiety disorder)   She is on escitalopram,  10 mg daily. She is tolerating the medication well. Denies depression. Anxiety is well-controlled. She would like medication refills.    PHQ9 score = 0  DEAN 7 score = 5    Rosacea   She uses topicals for her history of rosacea .   She is asking for medication refills    Screening for osteoporosis  Her mother has osteoporosis. She is concerned she may have the same. She is getting adequate calcium and vitamin D intake as well as weight-bearing exercise. She is not a smoker. Discussed referring her for DEXA scan.        Patient Active Problem List   Diagnosis     CARDIOVASCULAR SCREENING; LDL GOAL LESS THAN 130     DEAN (generalized anxiety disorder)     Rosacea     Chronic pain of both knees     Hot flashes       Current Outpatient Prescriptions   Medication Sig Dispense Refill     norethindrone-ethinyl estradiol (FEMHRT 1/5) 1-5 MG-MCG per tablet Take 1 tablet by mouth daily 84 tablet 3     escitalopram (LEXAPRO) 10 MG tablet Take 1 tablet (10 mg) by mouth daily 90 tablet 0     metroNIDAZOLE (METROGEL) 1 % gel Apply topically daily 60 g 3     sulfacetamide sodium, Acne, (KLARON) 10 % lotion Apply once at night 118 mL 3     estrogen, conjugated,-medroxyPROGESTERone (PREMPRO) 0.3-1.5 MG per tablet Take 1 tablet by mouth daily 90 tablet 3     acetaminophen (TYLENOL) 500 MG tablet Take 500-1,000  mg by mouth every 6 hours as needed for mild pain       IBUPROFEN PO Take 200 mg by mouth as needed for moderate pain        Multiple Vitamin (MV-ONE PO) Take 1 tablet by mouth daily        Glucosamine-Chondroit-Vit C-Mn (GLUCOSAMINE 1500 COMPLEX PO) Take by mouth daily        Calcium-Vitamin D (CALCIUM + D PO) Take 1 tablet by mouth daily        [DISCONTINUED] escitalopram (LEXAPRO) 10 MG tablet Take 1 tablet (10 mg) by mouth daily 90 tablet 0       No Known Allergies     EXAM  /68 (BP Location: Right arm, Patient Position: Chair, Cuff Size: Adult Regular)  Pulse 71  Temp 97.4  F (36.3  C) (Oral)  Wt 150 lb 0.6 oz (68.1 kg)  SpO2 100%  BMI 22.88 kg/m2  Gen: Alert, pleasant, NAD      Assessment:  (Z13.820) Screening for osteoporosis  (primary encounter diagnosis)  Comment: she is postmenopausal, family hx of osteoporosis  Plan: Dexa hip/pelvis/spine*        Recommend DEXA scan, discussed calcium/D requirements     (N95.1) Menopausal syndrome (hot flashes)  Comment: she is doing well with Prempro, however she reports it is very expensive  Plan: norethindrone-ethinyl estradiol (FEMHRT 1/5)         1-5 MG-MCG per tablet        Discussed trial of FemHRT which may cost less. New rx sent to pharmacy. She will contact me for any questions.    (F41.1) DEAN (generalized anxiety disorder)  Comment: doing well on Lexapro  Plan: escitalopram (LEXAPRO) 10 MG tablet        Refilled med.     (L71.9) Rosacea  Comment: mild symptoms, she is due for med refill  Plan: metroNIDAZOLE (METROGEL) 1 % gel, sulfacetamide        sodium, Acne, (KLARON) 10 % lotion        rx given.    Eryn Caro MD  Internal Medicine/Pediatrics

## 2017-11-10 ASSESSMENT — ANXIETY QUESTIONNAIRES: GAD7 TOTAL SCORE: 5

## 2018-01-08 ENCOUNTER — TELEPHONE (OUTPATIENT)
Dept: FAMILY MEDICINE | Facility: CLINIC | Age: 58
End: 2018-01-08

## 2018-01-08 NOTE — TELEPHONE ENCOUNTER
----- Message from Carlos Mahmoodnick sent at 1/8/2018 10:05 AM CST -----  Regarding: PT would like a call back from a nurse  Contact: [phone]  PT would like a call back.  She is requesting a referral for neurology.  PT stated she went to urgent care yesterday for pain in left shoulder and neck and put her on steroids.  PT wasn't told to do a follow up with Dr. Caro so she would like a call back and can be reached at [phone].    Thank you,  [facility]

## 2018-01-08 NOTE — TELEPHONE ENCOUNTER
"Spoke to pt - reports that she \"slept funny\" late last week; woke up with her head bent an unusual angle against her headboard - she experienced some numbness left arm and hand - now complaining mostly of numbness left thumb area. She did receive a trigger point injection from a pain MD at the [facility], that was not very helpful for pain relief. She also went to an urgent care Sat, as the pain was bothersome in her neck , radiating down left arm - they recommended ice, aleve, and gave her prednisone 40 mg x 7 days. She has had 2 doses of prednisone, and feels like this is already helping. She is wondering about followup for possible need for MRI, PT or neurologist. Advised appt for Fri with Dr Caro to further eval symptoms- she agrees with plan. She will call back prn concerns.  "

## 2018-01-12 ENCOUNTER — OFFICE VISIT (OUTPATIENT)
Dept: FAMILY MEDICINE | Facility: CLINIC | Age: 58
End: 2018-01-12
Payer: COMMERCIAL

## 2018-01-12 ENCOUNTER — RADIANT APPOINTMENT (OUTPATIENT)
Dept: MRI IMAGING | Facility: CLINIC | Age: 58
End: 2018-01-12
Attending: INTERNAL MEDICINE
Payer: COMMERCIAL

## 2018-01-12 VITALS
WEIGHT: 149 LBS | DIASTOLIC BLOOD PRESSURE: 81 MMHG | HEART RATE: 71 BPM | TEMPERATURE: 97.6 F | HEIGHT: 68 IN | SYSTOLIC BLOOD PRESSURE: 119 MMHG | BODY MASS INDEX: 22.58 KG/M2 | OXYGEN SATURATION: 98 %

## 2018-01-12 DIAGNOSIS — M54.2 CERVICALGIA: ICD-10-CM

## 2018-01-12 DIAGNOSIS — M54.2 CERVICALGIA: Primary | ICD-10-CM

## 2018-01-12 RX ORDER — HYDROCODONE BITARTRATE AND ACETAMINOPHEN 5; 325 MG/1; MG/1
TABLET ORAL
Qty: 20 TABLET | Refills: 0 | Status: SHIPPED | OUTPATIENT
Start: 2018-01-12 | End: 2019-05-08

## 2018-01-12 NOTE — PROGRESS NOTES
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 She \"slept funny\" and woke up with her head in a bent position, against the headboard. Initially, she had pain at the left side of her neck. One day later, she had left sided arm pain and numbness in her left thumb.  [place] [facility] [place] trigger point injection which did not give relief. She went to an urgent care. She got rx for prednisone 40mg daily x 7 days. Pain improved but numbness in thumb did not resolve. Pain returned in left arm yesterday. She did not sleep well. She has 1 additional day of prednisone. Feels weaker in left hand.     No previous problem with arm pain. No previous history of neck injury. Pain is heavy and achy, pulling in quality.     Patient Active Problem List   Diagnosis     CARDIOVASCULAR SCREENING; LDL GOAL LESS THAN 130     DEAN (generalized anxiety disorder)     Rosacea     Chronic pain of both knees     Hot flashes       Current Outpatient Prescriptions   Medication Sig Dispense Refill     norethindrone-ethinyl estradiol (FEMHRT 1/5) 1-5 MG-MCG per tablet Take 1 tablet by mouth daily 84 tablet 3     escitalopram (LEXAPRO) 10 MG tablet Take 1 tablet (10 mg) by mouth daily 90 tablet 0     sulfacetamide sodium, Acne, (KLARON) 10 % lotion Apply once at night 118 mL 3     estrogen, conjugated,-medroxyPROGESTERone (PREMPRO) 0.3-1.5 MG per tablet Take 1 tablet by mouth daily 90 tablet 3     acetaminophen (TYLENOL) 500 MG tablet Take 500-1,000 mg by mouth every 6 hours as needed for mild pain       IBUPROFEN PO Take 200 mg by mouth as needed for moderate pain        Multiple Vitamin (MV-ONE PO) Take 1 tablet by mouth daily        Glucosamine-Chondroit-Vit C-Mn (GLUCOSAMINE 1500 COMPLEX PO) Take by mouth daily        Calcium-Vitamin D (CALCIUM + D PO) Take 1 tablet by mouth daily          No Known Allergies     EXAM  /81  " "Pulse 71  Temp 97.6  F (36.4  C) (Oral)  Ht 5' 7.91\" (172.5 cm)  Wt 149 lb (67.6 kg)  SpO2 98%  BMI 22.71 kg/m2  Gen: Alert, pleasant, NAD  MS: Point tenderness over the bony mid cervical spine. Tenderness at the upper trapezius muscles and rhomboid muscles on the left upper back.  Neuro: DTR +2/4 in all extremities  Subjective numbness over the left thumb.  strength is diminished. At the left hand compared to right. Biceps tendon reflex is symmetric and upper extremities      Assessment:  (M54.2) Cervicalgia  (primary encounter diagnosis)  Comment: left sided neck pain with radiation to left arm. left thumb numbness  Plan: MR Cervical Spine w/o Contrast,         HYDROcodone-acetaminophen (NORCO) 5-325 MG per         tablet, [facility], HAND, AND CHIROPRACTIC REFERRAL        Will obtain MRI. Refer to PT, finish course of prednisone.   Will discuss treatment plan after MRI is completed.    Eryn Caro MD  Internal Medicine/Pediatrics    "

## 2018-01-12 NOTE — NURSING NOTE
"57 year old  Chief Complaint   Patient presents with     Neck Pain     more of left shoulder pt thinks she may have slept in a weird way     Thumb Discomfort     left thumb is numb started last friday       Blood pressure 119/81, pulse 71, temperature 97.6  F (36.4  C), temperature source Oral, height 5' 7.91\" (172.5 cm), weight 149 lb (67.6 kg), SpO2 98 %. Body mass index is 22.71 kg/(m^2).  Patient Active Problem List   Diagnosis     CARDIOVASCULAR SCREENING; LDL GOAL LESS THAN 130     DEAN (generalized anxiety disorder)     Rosacea     Chronic pain of both knees     Hot flashes       Wt Readings from Last 2 Encounters:   01/12/18 149 lb (67.6 kg)   11/08/17 150 lb 0.6 oz (68.1 kg)     BP Readings from Last 3 Encounters:   01/12/18 119/81   11/08/17 103/68   10/26/16 121/74         Current Outpatient Prescriptions   Medication     Probiotic Product (PROBIOTIC DAILY PO)     norethindrone-ethinyl estradiol (FEMHRT 1/5) 1-5 MG-MCG per tablet     escitalopram (LEXAPRO) 10 MG tablet     sulfacetamide sodium, Acne, (KLARON) 10 % lotion     estrogen, conjugated,-medroxyPROGESTERone (PREMPRO) 0.3-1.5 MG per tablet     acetaminophen (TYLENOL) 500 MG tablet     IBUPROFEN PO     Multiple Vitamin (MV-ONE PO)     Glucosamine-Chondroit-Vit C-Mn (GLUCOSAMINE 1500 COMPLEX PO)     Calcium-Vitamin D (CALCIUM + D PO)     No current facility-administered medications for this visit.        Social History   Substance Use Topics     Smoking status: Never Smoker     Smokeless tobacco: Never Used     Alcohol use Yes      Comment: 3 glasses wine per week       There are no preventive care reminders to display for this patient.    Lab Results   Component Value Date    PAP NIL 03/26/2014 January 12, 2018 9:03 AM  "

## 2018-01-12 NOTE — MR AVS SNAPSHOT
After Visit Summary   1/12/2018    Barby Jensen    MRN: [id number]           Patient Information     Date Of Birth          1960        Visit Information        Provider Department      1/12/2018 9:00 AM Eryn Caro MD [facility]        Today's Diagnoses     Cervicalgia    -  1       Follow-ups after your visit        Additional Services     [facility], AND CH[facility]RACT[facility] REFERRAL       **This order will print in the [facility] Scheduling Office**    Physical Therapy, Hand Therapy and Chiropractic Care are available through:    *[facility]  *[facility]  *[facility]    Call one number to schedule at any of the above locations: [phone].    Your provider has referred you to: Physical Therapy at [facility] or [facility]    Indication/Reason for Referral: Neck Pain and radiculopathy  Onset of Illness: one week  Therapy Orders: Evaluate and Treat  Special Programs: None  Special Request: None    Moon Mustafa      Additional Comments for the Therapist or Chiropractor: none      Please be aware that coverage of these services is subject to the terms and limitations of your health insurance plan.  Call member services at your health plan with any benefit or coverage questions.      Please bring the following to your appointment:    *Your personal calendar for scheduling future appointments  *Comfortable clothing                  Your next 10 appointments already scheduled     Jan 12, 2018  6:15 PM CST   (Arrive by 6:00 PM)   MR CERVICAL SPINE W/O CONTRAST with [id number]   [facility] ([facility])    [address] [address] [phone]           Take your medicines as usual, unless your doctor tells you not to. Bring a list of your current medicines to your exam (including vitamins, minerals and over-the-counter drugs). Also bring the results of similar scans you may  have had.  Please remove any body piercings and hair extensions before you arrive.  Follow your doctor s orders. If you do not, we may have to postpone your exam.  You will not have contrast for this exam. You do not need to do anything special to prepare.  The MRI machine uses a strong magnet. Please wear clothes without metal (snaps, zippers). A sweatsuit works well, or we may give you a hospital gown.   **IMPORTANT** THE INSTRUCTIONS BELOW ARE ONLY FOR THOSE PATIENTS WHO HAVE BEEN TOLD THEY WILL RECEIVE SEDATION OR GENERAL ANESTHESIA DURING THEIR MRI PROCEDURE:  IF YOU WILL RECEIVE SEDATION (take medicine to help you relax during your exam):   You must get the medicine from your doctor before you arrive. Bring the medicine to the exam. Do not take it at home.   Arrive one hour early. Bring someone who can take you home after the test. Your medicine will make you sleepy. After the exam, you may not drive, take a bus or take a taxi by yourself.   No eating 8 hours before your exam. You may have clear liquids up until 4 hours before your exam. (Clear liquids include water, clear tea, black coffee and fruit juice without pulp.)  IF YOU WILL RECEIVE ANESTHESIA (be asleep for your exam):   Arrive 1 1/2 hours early. Bring someone who can take you home after the test. You may not drive, take a bus or take a taxi by yourself.   No eating 8 hours before your exam. You may have clear liquids up until 4 hours before your exam. (Clear liquids include water, clear tea, black coffee and fruit juice without pulp.)   You will spend four to five hours in the recovery room.  Please call the Imaging Department at your exam site with any questions.              Future tests that were ordered for you today     Open Future Orders        Priority Expected Expires Ordered    MR Cervical Spine w/o Contrast Routine  1/12/2019 1/12/2018            Who to contact     Please call your clinic at [phone] to:    Ask questions about your  "health    Make or cancel appointments    Discuss your medicines    Learn about your test results    Speak to your doctor   If you have compliments or concerns about an experience at your clinic, or if you wish to file a complaint, please contact [facility] Patient Relations at [phone] or email us at [email or web address]         Additional Information About Your Visit        MyChart Information     [employer] gives you secure access to your electronic health record. If you see a primary care provider, you can also send messages to your care team and make appointments. If you have questions, please call your primary care clinic.  If you do not have a primary care provider, please call [phone] and they will assist you.      [employer] is an electronic gateway that provides easy, online access to your medical records. With [employer], you can request a clinic appointment, read your test results, renew a prescription or communicate with your care team.     To access your existing account, please contact your [facility] or call [phone] for assistance.        Care EveryWhere ID     This is your Care EveryWhere ID. This could be used by other organizations to access your [place] medical records  [id number]        Your Vitals Were     Pulse Temperature Height Pulse Oximetry BMI (Body Mass Index)       71 97.6  F (36.4  C) (Oral) 5' 7.91\" (172.5 cm) 98% 22.71 kg/m2        Blood Pressure from Last 3 Encounters:   01/12/18 119/81   11/08/17 103/68   10/26/16 121/74    Weight from Last 3 Encounters:   01/12/18 149 lb (67.6 kg)   11/08/17 150 lb 0.6 oz (68.1 kg)   07/06/17 163 lb (73.9 kg)              We Performed the Following     [facility], HAND, AND CHIROPRACTIC REFERRAL          Today's Medication Changes          These changes are accurate as of: 1/12/18  9:24 AM.  If you have any questions, ask your nurse or doctor.               Start taking these " medicines.        Dose/Directions    HYDROcodone-acetaminophen 5-325 MG per tablet   Commonly known as:  NORCO   Used for:  Cervicalgia   Started by:  Eryn Caro MD        Take one po tid prn pain, max 3 per day   Quantity:  20 tablet   Refills:  0            Where to get your medicines      Some of these will need a paper prescription and others can be bought over the counter.  Ask your nurse if you have questions.     Bring a paper prescription for each of these medications     HYDROcodone-acetaminophen 5-325 MG per tablet                Primary Care Provider Office Phone # Fax #    Eryn Caro [phone]       [address] [place] [address]        Equal Access to Services     [facility]: Haderin Pringle, kari funes, pedro pablo metcalf, alexa haskins . So [facility] [phone].    ATENCIÓN: Si habla español, tiene a robertson disposición servicios gratuitos de asistencia lingüística. Llame al [phone].    We comply with applicable federal civil rights laws and [place] laws. We do not discriminate on the basis of race, color, national origin, age, disability, sex, sexual orientation, or gender identity.            Thank you!     Thank you for choosing [facility]  for your care. Our goal is always to provide you with excellent care. Hearing back from our patients is one way we can continue to improve our services. Please take a few minutes to complete the written survey that you may receive in the mail after your visit with us. Thank you!             Your Updated Medication List - Protect others around you: Learn how to safely use, store and throw away your medicines at www.disposemymeds.org.          This list is accurate as of: 1/12/18  9:24 AM.  Always use your most recent med list.                   Brand Name Dispense Instructions for use Diagnosis    acetaminophen 500 MG tablet    TYLENOL     Take  500-1,000 mg by mouth every 6 hours as needed for mild pain        CALCIUM + D PO      Take 1 tablet by mouth daily    Rosacea       escitalopram 10 MG tablet    LEXAPRO    90 tablet    Take 1 tablet (10 mg) by mouth daily    DEAN (generalized anxiety disorder)       estrogen (conjugated)-medroxyPROGESTERone 0.3-1.5 MG per tablet    PREMPRO    90 tablet    Take 1 tablet by mouth daily    Menopausal syndrome (hot flashes)       GLUCOSAMINE 1500 COMPLEX PO      Take by mouth daily    Rosacea       HYDROcodone-acetaminophen 5-325 MG per tablet    NORCO    20 tablet    Take one po tid prn pain, max 3 per day    Cervicalgia       IBUPROFEN PO      Take 200 mg by mouth as needed for moderate pain        MV-ONE PO      Take 1 tablet by mouth daily    Rosacea       norethindrone-ethinyl estradiol 1-5 MG-MCG per tablet    FEMHRT 1/5    84 tablet    Take 1 tablet by mouth daily    Menopausal syndrome (hot flashes)       PROBIOTIC DAILY PO      Take by mouth daily        sulfacetamide sodium (Acne) 10 % lotion    KLARON    118 mL    Apply once at night    Rosacea

## 2018-01-13 NOTE — DISCHARGE INSTRUCTIONS
MRI Contrast Discharge Instructions    The IV contrast you received today will pass out of your body in your  urine. This will happen in the next 24 hours. You will not feel this process.  Your urine will not change color.    Drink at least 4 extra glasses of water or juice today (unless your doctor  has restricted your fluids). This reduces the stress on your kidneys.  You may take your regular medicines.    If you are on dialysis: It is best to have dialysis today.    If you have a reaction: Most reactions happen right away. If you have  any new symptoms after leaving the hospital (such as hives or swelling),  call your hospital at the correct number below. Or call your family doctor.  If you have breathing distress or wheezing, call 911.    Special instructions: ***    I have read and understand the above information.    Signature:______________________________________ Date:___________    Staff:__________________________________________ Date:___________     Time:__________    [facility] Departments:    ___[place]: [phone]  ___[facility]: [phone]  ___[place]: [phone] ___[facility]: [phone]  ___[facility]: [phone]  ___[facility]: [phone]  ___[phone]  ___[facility]: [phone]  ___[phone]

## 2018-01-15 ENCOUNTER — MYC MEDICAL ADVICE (OUTPATIENT)
Dept: FAMILY MEDICINE | Facility: CLINIC | Age: 58
End: 2018-01-15

## 2018-01-15 DIAGNOSIS — M54.2 CERVICALGIA: Primary | ICD-10-CM

## 2018-01-15 ASSESSMENT — PATIENT HEALTH QUESTIONNAIRE - PHQ9: SUM OF ALL RESPONSES TO PHQ QUESTIONS 1-9: 1

## 2018-01-16 ENCOUNTER — THERAPY VISIT (OUTPATIENT)
Dept: PHYSICAL THERAPY | Facility: CLINIC | Age: 58
End: 2018-01-16
Payer: COMMERCIAL

## 2018-01-16 DIAGNOSIS — M54.2 CERVICAL PAIN: Primary | ICD-10-CM

## 2018-01-16 PROCEDURE — 97110 THERAPEUTIC EXERCISES: CPT | Mod: GP | Performed by: PHYSICAL THERAPIST

## 2018-01-16 PROCEDURE — 97161 PT EVAL LOW COMPLEX 20 MIN: CPT | Mod: GP | Performed by: PHYSICAL THERAPIST

## 2018-01-16 NOTE — MR AVS SNAPSHOT
After Visit Summary   1/16/2018    Barby Jensen    MRN: [id number]           Patient Information     Date Of Birth          1960        Visit Information        Provider Department      1/16/2018 4:10 PM Luiz Lui PT [facility]        Today's Diagnoses     Cervical pain    -  1       Follow-ups after your visit        Your next 10 appointments already scheduled     Jan 23, 2018  4:50 PM CST   [facility] Spine with Petra Ivy PTA   [facility] ([facility])    [address] [place] [address] [address] [facility] [id number] [phone]            Jan 29, 2018  7:20 AM CST   (Arrive by 7:05 AM)   New Patient Visit with Levi Conteh MD   [facility] ([facility])    [address] [place] [id number] [phone]            Feb 02, 2018  4:50 PM CST   [facility]RISTAN Spine with Luiz Henderson PT   [facility] ([facility])    [address] [place] [address] [phone] [phone]              Who to contact     If you have questions or need follow up information about today's clinic visit or your schedule please contact [facility] PH[facility]AP[facility] directly at [phone].  Normal or non-critical lab and imaging results will be communicated to you by MyChart, letter or phone within 4 business days after the clinic has received the results. If you do not hear from us within 7 days, please contact the clinic through MyChart or phone. If you have a critical or abnormal lab result, we will notify you by phone as soon as possible.  Submit refill requests through [employer] or call your pharmacy and they will forward the refill request to us. Please allow 3 business days for your refill to be completed.          Additional Information About Your Visit        MyChart Information     [facility] gives  you secure access to your electronic health record. If you see a primary care provider, you can also send messages to your care team and make appointments. If you have questions, please call your primary care clinic.  If you do not have a primary care provider, please call [phone] and they will assist you.        Care EveryWhere ID     This is your Care EveryWhere ID. This could be used by other organizations to access your [facility] medical records  [id number]         Blood Pressure from Last 3 Encounters:   01/12/18 119/81   11/08/17 103/68   10/26/16 121/74    Weight from Last 3 Encounters:   01/12/18 67.6 kg (149 lb)   11/08/17 68.1 kg (150 lb 0.6 oz)   07/06/17 73.9 kg (163 lb)              We Performed the Following     [facility] Inital Eval Report     PT Eval, Low Complexity (77639)     Therapeutic Exercises        Primary Care Provider Office Phone # Fax #    Eryn Tessy Caro [phone]       [address] [place] [id number]        Equal Access to Services     [facility]: Hadii aad ku hadasho Soelvia, waaxda luqadaha, qaybta kaalmaanish metcalf, alexa haskins . So [facility] [place] [phone].    ATENCIÓN: Si habla español, tiene a robertson disposición servicios gratuitos de asistencia lingüística. [facility] [phone].    We comply with applicable federal civil rights laws and [place] laws. We do not discriminate on the basis of race, color, national origin, age, disability, sex, sexual orientation, or gender identity.            Thank you!     Thank you for choosing [facility] PHYS[facility] THERAP[facility]  for your care. Our goal is always to provide you with excellent care. Hearing back from our patients is one way we can continue to improve our services. Please take a few minutes to complete the written survey that you may receive in the mail after your visit with us. Thank you!             Your Updated Medication List - Protect others around you: Learn how to safely  use, store and throw away your medicines at www.disposemymeds.org.          This list is accurate as of: 1/16/18  4:58 PM.  Always use your most recent med list.                   Brand Name Dispense Instructions for use Diagnosis    acetaminophen 500 MG tablet    TYLENOL     Take 500-1,000 mg by mouth every 6 hours as needed for mild pain        CALCIUM + D PO      Take 1 tablet by mouth daily    Rosacea       escitalopram 10 MG tablet    LEXAPRO    90 tablet    Take 1 tablet (10 mg) by mouth daily    DEAN (generalized anxiety disorder)       estrogen (conjugated)-medroxyPROGESTERone 0.3-1.5 MG per tablet    PREMPRO    90 tablet    Take 1 tablet by mouth daily    Menopausal syndrome (hot flashes)       GLUCOSAMINE 1500 COMPLEX PO      Take by mouth daily    Rosacea       HYDROcodone-acetaminophen 5-325 MG per tablet    NORCO    20 tablet    Take one po tid prn pain, max 3 per day    Cervicalgia       IBUPROFEN PO      Take 200 mg by mouth as needed for moderate pain        MV-ONE PO      Take 1 tablet by mouth daily    Rosacea       norethindrone-ethinyl estradiol 1-5 MG-MCG per tablet    FEMHRT 1/5    84 tablet    Take 1 tablet by mouth daily    Menopausal syndrome (hot flashes)       PROBIOTIC DAILY PO      Take by mouth daily        sulfacetamide sodium (Acne) 10 % lotion    KLARON    118 mL    Apply once at night    Rosacea

## 2018-01-16 NOTE — PROGRESS NOTES
[facility] Initial Evaluation  Subjective:  [facility]                  Physical Therapy Initial Examination/Evaluation  January 16, 2018    Barby Jensen is a 57 year old female referred to physical therapy  for treatment of neck pain with Precautions/Restrictions/MD instructions none      Subjective:  DOI/onset: 1/5/18   Acute Injury or Gradual Onset?: Acute injury onset  Mechanism of Injury: awoke with a flexed neck sleeping position the neck day stiffness and knot in left post trap-had an injection for trigger point no help-predisone mild change.  MRI-awaiting MD follow up and possible injection.    Left UE numbness thumb, reports some weakness and dexterity issues with opening supplies, gripping  Related PMH: none   Imaging: MRI  Chief Complaint/Functional Limitations:   Constant pain that increases with lifting and opening things and see below in therapy evaluation codes   Pain: rest 4 /10, activity 8/10 Location: left UT Frequency: Constant Described as: aching Alleviated by: Pain medications Progression of Symptoms: Unchanged Time of day when pain is worse: Activity related  Sleeping: No issues/uninterrupted   Occupation: RN  Job duties: prolonged sitting, prolonged standing, lifting/carrying  Current HEP/exercise regimen: nothing, cross country ski-classic  Patient's goals are see chief complaints     Other pertinent PMH/Red Flags: osteoarthritis   Barriers at home/work: None as reported by patient  Pertinent Surgical History: none  Medications: None as reported by patient  General health as reported by patient: good  Return to MD:  29th      Objective:  System              Cervical/Thoracic Evaluation    AROM:  AROM Cervical:    Flexion:            100%  Extension:       50% and left UE tingling  Rotation:         Left: 75%     Right: 75%  Side Bend:      Left: 75% and left UE tingling     Right:  75%         Cervical Myotomes:  normal                        Cervical Palpation:  : left  UT.                                                      General     ROS    Assessment/Plan:    Patient is a 57 year old female with cervical complaints.    Patient has the following significant findings with corresponding treatment plan.                Diagnosis 1:  Cervical DDD with neural compression left  Pain -  hot/cold therapy, US, mechanical traction, manual therapy, splint/taping/bracing/orthotics, self management, education and home program  Decreased ROM/flexibility - manual therapy and therapeutic exercise  Decreased strength - therapeutic exercise and therapeutic activities  Decreased function - therapeutic activities    Therapy Evaluation Codes:   1) History comprised of:   Personal factors that impact the plan of care:      None.    Comorbidity factors that impact the plan of care are:      None.     Medications impacting care: None.  2) Examination of Body Systems comprised of:   Body structures and functions that impact the plan of care:      Cervical spine.   Activity limitations that impact the plan of care are:      Lifting.  3) Clinical presentation characteristics are:   Stable/Uncomplicated.  4) Decision-Making    Low complexity using standardized patient assessment instrument and/or measureable assessment of functional outcome.  Cumulative Therapy Evaluation is: Low complexity.    Previous and current functional limitations:  (See Goal Flow Sheet for this information)    Short term and Long term goals: (See Goal Flow Sheet for this information)     Communication ability:  Patient appears to be able to clearly communicate and understand verbal and written communication and follow directions correctly.  Treatment Explanation - The following has been discussed with the patient:   RX ordered/plan of care  Anticipated outcomes  Possible risks and side effects  This patient would benefit from PT intervention to resume normal activities.   Rehab potential is good.    Frequency:  2 X week, once  daily  Duration:  For 1 week followed by 1 time a week for 8 weeks  Discharge Plan:  Achieve all LTG.  Independent in home treatment program.  Reach maximal therapeutic benefit.    Please refer to the daily flowsheet for treatment today, total treatment time and time spent performing 1:1 timed codes.

## 2018-01-16 NOTE — TELEPHONE ENCOUNTER
Referral placed. I called her and left message with the   number for her to  to set up an appointment.        Patient is continuing to have the pain in her Left neck and arm and wants referral for the pain clinic at the [facility].  Can we do that for her?  Nuzhat CANTU  [facility]  January 16, 2018 10:22 AM        ----- Message from Iliana Anders sent at 1/16/2018  8:13 AM CST -----  Regarding: Pt would like to get a referral for the pain clinic at the [facility]  Contact: [phone]  Pt would like to get a referral for the pain clinic at the [facility]. Pt can be reached at [phone].    Thank you,  Iliana TAYLOR

## 2018-01-18 NOTE — TELEPHONE ENCOUNTER
APPT INFO    Date /Time: 1/29/18 7:20AM    Reason for Appt: Left shoulder pain radiating down left thumb, left thumb numbness   Ref Provider/Clinic: Vania WALKER    Are there internal records? Yes/No?  IF YES, list clinic names: [facility] Vania WALKER (referring) / Images In PACS   [facility] [place] B[facility] PT    Are there outside records? Yes/No? No    Patient Contact (Y/N) & Call Details: No, pt was referred.    Action: Closing encounter.

## 2018-01-23 ENCOUNTER — THERAPY VISIT (OUTPATIENT)
Dept: PHYSICAL THERAPY | Facility: CLINIC | Age: 58
End: 2018-01-23
Payer: COMMERCIAL

## 2018-01-23 DIAGNOSIS — M54.2 CERVICAL PAIN: ICD-10-CM

## 2018-01-23 PROCEDURE — 97110 THERAPEUTIC EXERCISES: CPT | Mod: GP | Performed by: PHYSICAL THERAPY ASSISTANT

## 2018-01-23 PROCEDURE — 97140 MANUAL THERAPY 1/> REGIONS: CPT | Mod: GP | Performed by: PHYSICAL THERAPY ASSISTANT

## 2018-01-27 ASSESSMENT — ANXIETY QUESTIONNAIRES
4. TROUBLE RELAXING: NOT AT ALL
6. BECOMING EASILY ANNOYED OR IRRITABLE: NOT AT ALL
GAD7 TOTAL SCORE: 0
2. NOT BEING ABLE TO STOP OR CONTROL WORRYING: NOT AT ALL
7. FEELING AFRAID AS IF SOMETHING AWFUL MIGHT HAPPEN: NOT AT ALL
GAD7 TOTAL SCORE: 0
3. WORRYING TOO MUCH ABOUT DIFFERENT THINGS: NOT AT ALL
7. FEELING AFRAID AS IF SOMETHING AWFUL MIGHT HAPPEN: NOT AT ALL
1. FEELING NERVOUS, ANXIOUS, OR ON EDGE: NOT AT ALL
5. BEING SO RESTLESS THAT IT IS HARD TO SIT STILL: NOT AT ALL

## 2018-01-27 ASSESSMENT — ENCOUNTER SYMPTOMS
ARTHRALGIAS: 1
MUSCLE CRAMPS: 0
STIFFNESS: 1
MYALGIAS: 1
NECK PAIN: 1
BACK PAIN: 0
MUSCLE WEAKNESS: 1
JOINT SWELLING: 0

## 2018-01-28 ASSESSMENT — ANXIETY QUESTIONNAIRES: GAD7 TOTAL SCORE: 0

## 2018-01-29 ENCOUNTER — OFFICE VISIT (OUTPATIENT)
Dept: ANESTHESIOLOGY | Facility: CLINIC | Age: 58
End: 2018-01-29
Payer: COMMERCIAL

## 2018-01-29 ENCOUNTER — PRE VISIT (OUTPATIENT)
Dept: ANESTHESIOLOGY | Facility: CLINIC | Age: 58
End: 2018-01-29

## 2018-01-29 VITALS
DIASTOLIC BLOOD PRESSURE: 71 MMHG | HEART RATE: 69 BPM | RESPIRATION RATE: 16 BRPM | HEIGHT: 68 IN | BODY MASS INDEX: 21.98 KG/M2 | WEIGHT: 145 LBS | SYSTOLIC BLOOD PRESSURE: 110 MMHG

## 2018-01-29 DIAGNOSIS — M54.12 CERVICAL RADICULOPATHY: Primary | ICD-10-CM

## 2018-01-29 ASSESSMENT — PAIN SCALES - GENERAL: PAINLEVEL: MODERATE PAIN (5)

## 2018-01-29 NOTE — LETTER
1/29/2018       RE: Barby Jensen  [facility] [id number]     Dear Colleague,    Thank you for referring your patient, Barby Jensen, to the [facility] at [facility]. Please see a copy of my visit note below.    Patient is a 57-year-old woman with a history of neck pain since January 2018.  The pain is on the left.  The pain is 5 out of 10 in severity.  There is a radicular component on the left.  She feels that her thumb is weak on the left and that she has dexterity issues.  The pain is described as burning/pressure/constant/dull ache.  The pain is worse at night.  The pain is alleviated by Vicodin use of a Thera cane, Aleve, physical therapy.  The pain is exacerbated by certain positions, sitting, and driving.  She has used several different modalities to treat her point pain these have included trigger point injections which were not helpful, steroid dose pack which was equivocal, is a good therapy which was equivocal, Hycodan which was helpful but sedating, and Aleve which was equivocal.  She has never tried gabapentin.  She presents today for initial evaluation.  Current Outpatient Prescriptions   Medication     gabapentin (NEURONTIN) 100 MG capsule     Probiotic Product (PROBIOTIC DAILY PO)     HYDROcodone-acetaminophen (NORCO) 5-325 MG per tablet     escitalopram (LEXAPRO) 10 MG tablet     sulfacetamide sodium, Acne, (KLARON) 10 % lotion     estrogen, conjugated,-medroxyPROGESTERone (PREMPRO) 0.3-1.5 MG per tablet     acetaminophen (TYLENOL) 500 MG tablet     IBUPROFEN PO     Multiple Vitamin (MV-ONE PO)     Glucosamine-Chondroit-Vit C-Mn (GLUCOSAMINE 1500 COMPLEX PO)     Calcium-Vitamin D (CALCIUM + D PO)     norethindrone-ethinyl estradiol (FEMHRT 1/5) 1-5 MG-MCG per tablet     No current facility-administered medications for this visit.      No Known Allergies  Past Medical History:   Diagnosis Date      Acne rosacea      ASCUS on Pap smear 08/06     Reactive airway disease      Past Surgical History:   Procedure Laterality Date     ARTHROSCOPY KNEE BILATERAL Bilateral 9/4/2015    Procedure: ARTHROSCOPY KNEE BILATERAL;  Surgeon: Tracie Stewart MD;  [facility]     EXCISE MASS UPPER EXTREMITY  10/09    right forearm lipoma     KNEE SURGERY  9/2015    Bilateral meniscectomies     Family History   Problem Relation Age of Onset     GASTROINTESTINAL DISEASE Mother      HEART DISEASE Mother      pacemaker     Hearing Loss Mother      Hypertension Mother      Heart Failure Mother      Arrhythmia Mother      Osteopenia Mother      Hearing Loss Father      Arthritis Father      Prostate Cancer Father      Eye Disorder Father      macular degeneration     Osteopenia Father      after tx for prostate cancer     Depression Paternal Grandmother      Social History     Social History     Marital status:      Spouse name: Luiz      Number of children: 2     Years of education: N/A     Occupational History     RN      [employer]     Social History Main Topics     Smoking status: Never Smoker     Smokeless tobacco: Never Used     Alcohol use Yes      Comment: 3 glasses wine per week     Drug use: No     Sexual activity: Yes     Partners: Male     Birth control/ protection: Post-menopausal     Other Topics Concern     Parent/Sibling W/ Cabg, Mi Or Angioplasty Before 65f 55m? No     Social History Narrative      ROS: 10 point ROS neg other than the symptoms noted above in the HPI.  Physical Exam   Constitutional: She is oriented to person, place, and time. She appears well-developed and well-nourished.   HENT:   Head: Normocephalic and atraumatic.   Right Ear: External ear normal.   Left Ear: External ear normal.   Nose: Nose normal.   Mouth/Throat: Oropharynx is clear and moist.   Eyes: Conjunctivae and EOM are normal. Pupils are equal, round, and reactive to light.   Neck: Normal range of motion.  Neck supple.   Cardiovascular: Normal rate, regular rhythm, normal heart sounds and intact distal pulses.    Pulmonary/Chest: Effort normal and breath sounds normal.   Abdominal: Soft. Bowel sounds are normal.   Musculoskeletal:        Cervical back: She exhibits tenderness, pain and spasm. She exhibits normal range of motion, no bony tenderness, no swelling, no edema, no deformity, no laceration and normal pulse.   Neurological: She is alert and oriented to person, place, and time. She has normal reflexes.   Skin: Skin is warm and dry.   Psychiatric: She has a normal mood and affect.     MR CERVICAL SPINE W/O CONTRAST 1/12/2018 6:42 PM     History: cervicalgia with radiaiton of pain to the left arm, hand .  left thumb numb, weakness; Cervicalgia     Comparison: None available.     Technique: Sagittal T1-weighted, sagittal T2-weighted, sagittal STIR,  axial T2-weighted, and axial T2* gradient echo images of the cervical  spine were obtained without intravenous contrast.     Findings: Normal cervical lordosis and vertebral alignment. Multilevel  disc degeneration. Disc height narrowing at C5-C6. Minimal opposing  endplate reactive marrow edema about the C5-6 and C6-7 interspaces.  Normal cervical cord signal and morphology. Paraspinous soft tissues  are normal.     Findings on a level by level basis:     C2-3: Right facet hypertrophy. No spinal canal or neural foraminal  stenosis.     C3-4:  Bilateral facet hypertrophy. No spinal canal or neural  foraminal stenosis     C4-5:  Bilateral facet hypertrophy. Right uncinate spurring. Moderate  right neural foraminal stenosis. No significant spinal canal stenosis.     C5-6: Dorsal osteophytic spur. Bilateral uncinate spurring and facet  hypertrophy. Moderate right and mild left neural foraminal stenosis.  No significant spinal canal stenosis.      C6-7: Tiny central disc protrusion. Uncinate spurring. No significant  spinal canal or neural foraminal stenosis.     C7-T1:   No spinal canal or neural foraminal stenosis.         Impression: Multilevel cervical spondylosis. Moderate neural foraminal  stenosis on the right C4-C6 and mild neural foraminal stenosis on the  left at C5-6. No significant spinal canal stenosis. No cord signal  abnormality.     I have personally reviewed the examination and initial interpretation  and I agree with the findings.     TITUS ELLINGTON MD    A/P: Patient is a 57-year-old woman with a history of neck pain and radicular symptoms to the left.  MRI results are documented above.  Patient has tried some conservative measures without significant relief.  She has never tried gabapentin.  I would like to perform an epidural steroid injection in the cervical region.  And I would also like to start gabapentin 100 mg 3 times daily to determine if her symptoms can be improved with these modalities.  Patient verbalizes an understanding of our plan and will return for said procedure.    Again, thank you for allowing me to participate in the care of your patient.      Sincerely,    Levi Conteh MD

## 2018-01-29 NOTE — PATIENT INSTRUCTIONS
1. Take gabapentin for neuropathic pain. Take 100mg three times per day.    2. Schedule procedure and clinic follow up with Ariane.    Follow up:    2-4 week clinic follow up after your epidural        To speak with a nurse, schedule/reschedule/cancel a clinic appointment, or request a medication refill call: [phone]     You can also reach us by [employer]: [email or web address]    For refills, please call on Monday, 1 week before your medication runs out. The doctors are not always in clinic, so this gives us time to get your prescriptions ready.  Please let us know the name of the medication you are requesting a refill of.                                 Please call Ariane at [phone] to schedule your procedure. She is available Monday - Friday from 9-5:30pm, if she is unavailable to take your call, please leave her a voice message with your name, birth date, and phone number and she will call you back.        Your procedure: Cervical epidural steroid injection    On the day of the procedure  1. Arrive 1 hour earlier than your scheduled time, to the [facility]  Address: [address] [place] [id number]  2. Check in on the 5th floor for your procedure      You will need to follow up in clinic in 2-4 weeks after your procedure.    If you must reschedule your procedure more than two times, you must follow up in clinic before rescheduling again.      Patient Pre-Procedure Instructions    CAUTION - FAILURE TO FOLLOW THESE PRE-PROCEDURE INSTRUCTIONS WILL RESULT IN YOUR PROCEDURE BEING RESCHEDULED.      Pregnancy  If you are pregnant, or think that you may be pregnant, please notify our staff. This may or may not affect the ability to perform the procedure.     You MUST have a  TO TAKE YOU HOME after your procedure. Transportation by Taxi or Para-transit must have a responsible adult accompany you home (other than the ). Travel by bus or light rail  is not acceptable transportation. You must provide your 's full name and contact number at time of check in.   Fasting Protocol You may have NOTHING SOLID TO EAT FOR 8 HOURS prior to arrival at the procedure area.   Broth and candy are considered solid food and require an eight hour fast.   You may have CLEAR LIQUIDS UP TO 2 HOURS prior to arrival at the clinic.   Clear liquids include water, clear fruit juice (no pulp) carbonated beverages, ice, black coffee, black tea (no milk or cream), chewing gum (un-swallowed), and/or clear jello (no fruit or milk). No alcohol containing beverages.   Medications If you take any medications,  DO NOT STOP. Take your medications as usual the morning of your procedure with a sip of water AT LEAST 2 HOURS PRIOR TO ARRIVAL.    Antibiotics If you are currently taking antibiotics, you must complete the entire dose 7 days prior to your scheduled procedure. You must be clear of any signs or symptoms of infection. If you begin antibiotics, please contact our clinic for instructions.   Fever, Chills, or Rash If you experience a fever of higher than 100 degrees, chills, rash, or open wounds during the one week prior to your procedure, please call the clinic.         Medication Hold List  **Patients under Cardiology/Neurology care should consult their provider prior to the pain procedure to verify pre-procedure medication instructions. The information below contains general guidelines.**    Blood Thinners If you are taking daily ASPIRIN, PLAVIX, OR OTHER BLOOD THINNERS SUCH AS COUMADIN/WARFARIN, we will need your prescribing doctor to sign a release permitting you to stop these medications. Once approved by your prescribing doctor - STOP ALL BLOOD THINNERS BASED ON THE TIME TABLE BELOW PRIOR TO YOUR PROCEDURE. If you have been instructed to stop WARFARIN(COUMADIN), you must have an INR lab drawn the day before your procedure. . Your INR must be within normal limits before we can  perform your injection. MEDICATIONS CAN BE RESTARTED AFTER YOUR PROCEDURE.    14 DAY HOLD  Ticlid (ticlopidine)    10 DAY HOLD  Effient (Prasugel)    3 DAY HOLD  Xarelto (rivaroxaban) 7 DAY HOLD  Anacin, Bufferin, Ecotrin, Excedrin, Aggrenox (Aspirin)  Brilinta (ticagrelor)  Coumadin (Warfarin)  Pradexa (Dabigatran)  Elmiron (Pentosan)  Plavix (Clopidogrel Bisulfate)  Pletal (Cilostazol)    24 HOUR HOLD  Lovenox (enoxaparin)  Agrylin (Anagrelide)        Non-steroidal Anti-inflammatories (NSAIDs) DO NOT TAKE any non-steroidal anti-inflammatory medications (NSAIDs) listed on the table below. MEDICATIONS CAN BE RESTARTED AFTER YOUR PROCEDURE. Celebrex is OK to take and does not need to be discontinued.     Medications to stop:  3 DAY HOLD  Advil, Motrin (Ibuprofen)  Arthrotec (diciofenac sodium/misoprostol)  Clinoril (Sulindac)  Indocin (Indomethacin)  Lodine (Etodolac)  Toradol (Ketorolac)  Vicoprofen (Hydrocodone and Ibuprofen)  Voltaren (Diclotenac)    14 DAY HOLD  Daypro (Oxaprozin)  Feldene (Piroxicam)   7 DAY HOLD  Aleve (Naproxen sodium)  Darvon compound (contains aspirin)  Naprosyn (Naproxen)  Norgesic Forte (contains aspirin)  Mobic (Meloxicam)  Oruvall (Ketoprofen)  Percodan (contains aspirin)  Relafen (Nabumetone)  Salsalate  Trilisate  Vitamin E (more than 400 mg per day)  Any medication containing aspirin                To speak with a nurse, schedule/reschedule/cancel a clinic appointment, or request a medication refill call: [phone]     You can also reach us by [employer]: [email or web address]

## 2018-01-29 NOTE — MR AVS SNAPSHOT
After Visit Summary   1/29/2018    Barby Jensen    MRN: [id number]           Patient Information     Date Of Birth          1960        Visit Information        Provider Department      1/29/2018 7:20 AM Levi Conteh MD [facility]        Care Instructions    1. Take gabapentin for neuropathic pain. Take 100mg three times per day.    2. Schedule procedure and clinic follow up with Ariane.    Follow up:    2-4 week clinic follow up after your epidural        To speak with a nurse, schedule/reschedule/cancel a clinic appointment, or request a medication refill call: [phone]     You can also reach us by [employer]: [email or web address]    For refills, please call on Monday, 1 week before your medication runs out. The doctors are not always in clinic, so this gives us time to get your prescriptions ready.  Please let us know the name of the medication you are requesting a refill of.                                 Please call Ariane at [phone] to schedule your procedure. She is available Monday - Friday from 9-5:30pm, if she is unavailable to take your call, please leave her a voice message with your name, birth date, and phone number and she will call you back.        Your procedure: Cervical epidural steroid injection    On the day of the procedure  1. Arrive 1 hour earlier than your scheduled time, to the [facility]  Address: [address] [place] [address]  2. Check in on the 5th floor for your procedure      You will need to follow up in clinic in 2-4 weeks after your procedure.    If you must reschedule your procedure more than two times, you must follow up in clinic before rescheduling again.      Patient Pre-Procedure Instructions    CAUTION - FAILURE TO FOLLOW THESE PRE-PROCEDURE INSTRUCTIONS WILL RESULT IN YOUR PROCEDURE BEING RESCHEDULED.      Pregnancy  If you are pregnant, or think that you  may be pregnant, please notify our staff. This may or may not affect the ability to perform the procedure.     You MUST have a  TO TAKE YOU HOME after your procedure. Transportation by Taxi or Para-transit must have a responsible adult accompany you home (other than the ). Travel by bus or light rail is not acceptable transportation. You must provide your 's full name and contact number at time of check in.   Fasting Protocol You may have NOTHING SOLID TO EAT FOR 8 HOURS prior to arrival at the procedure area.   Broth and candy are considered solid food and require an eight hour fast.   You may have CLEAR LIQUIDS UP TO 2 HOURS prior to arrival at the clinic.   Clear liquids include water, clear fruit juice (no pulp) carbonated beverages, ice, black coffee, black tea (no milk or cream), chewing gum (un-swallowed), and/or clear jello (no fruit or milk). No alcohol containing beverages.   Medications If you take any medications,  DO NOT STOP. Take your medications as usual the morning of your procedure with a sip of water AT LEAST 2 HOURS PRIOR TO ARRIVAL.    Antibiotics If you are currently taking antibiotics, you must complete the entire dose 7 days prior to your scheduled procedure. You must be clear of any signs or symptoms of infection. If you begin antibiotics, please contact our clinic for instructions.   Fever, Chills, or Rash If you experience a fever of higher than 100 degrees, chills, rash, or open wounds during the one week prior to your procedure, please call the clinic.         Medication Hold List  **Patients under Cardiology/Neurology care should consult their provider prior to the pain procedure to verify pre-procedure medication instructions. The information below contains general guidelines.**    Blood Thinners If you are taking daily ASPIRIN, PLAVIX, OR OTHER BLOOD THINNERS SUCH AS COUMADIN/WARFARIN, we will need your prescribing doctor to sign a release permitting  you to stop these medications. Once approved by your prescribing doctor - STOP ALL BLOOD THINNERS BASED ON THE TIME TABLE BELOW PRIOR TO YOUR PROCEDURE. If you have been instructed to stop WARFARIN(COUMADIN), you must have an INR lab drawn the day before your procedure. . Your INR must be within normal limits before we can perform your injection. MEDICATIONS CAN BE RESTARTED AFTER YOUR PROCEDURE.    14 DAY HOLD  Ticlid (ticlopidine)    10 DAY HOLD  Effient (Prasugel)    3 DAY HOLD  Xarelto (rivaroxaban) 7 DAY HOLD  Anacin, Bufferin, Ecotrin, Excedrin, Aggrenox (Aspirin)  Brilinta (ticagrelor)  Coumadin (Warfarin)  Pradexa (Dabigatran)  Elmiron (Pentosan)  Plavix (Clopidogrel Bisulfate)  Pletal (Cilostazol)    24 HOUR HOLD  Lovenox (enoxaparin)  Agrylin (Anagrelide)        Non-steroidal Anti-inflammatories (NSAIDs) DO NOT TAKE any non-steroidal anti-inflammatory medications (NSAIDs) listed on the table below. MEDICATIONS CAN BE RESTARTED AFTER YOUR PROCEDURE. Celebrex is OK to take and does not need to be discontinued.     Medications to stop:  3 DAY HOLD  Advil, Motrin (Ibuprofen)  Arthrotec (diciofenac sodium/misoprostol)  Clinoril (Sulindac)  Indocin (Indomethacin)  Lodine (Etodolac)  Toradol (Ketorolac)  Vicoprofen (Hydrocodone and Ibuprofen)  Voltaren (Diclotenac)    14 DAY HOLD  Daypro (Oxaprozin)  Feldene (Piroxicam)   7 DAY HOLD  Aleve (Naproxen sodium)  Darvon compound (contains aspirin)  Naprosyn (Naproxen)  Norgesic Forte (contains aspirin)  Mobic (Meloxicam)  Oruvall (Ketoprofen)  Percodan (contains aspirin)  Relafen (Nabumetone)  Salsalate  Trilisate  Vitamin E (more than 400 mg per day)  Any medication containing aspirin                To speak with a nurse, schedule/reschedule/cancel a clinic appointment, or request a medication refill call: [phone]     You can also reach us by [employer]: [email or web address]            Follow-ups after your visit        Your next 10 appointments  "already scheduled     Feb 02, 2018  4:50 PM CST   [facility] with Luiz Henderson PT    [facility] ([facility])    [address] [address] [phone]              Who to contact     Please call your clinic at [phone] to:    Ask questions about your health    Make or cancel appointments    Discuss your medicines    Learn about your test results    Speak to your doctor   If you have compliments or concerns about an experience at your clinic, or if you wish to file a complaint, please contact [facility] Patient Relations at [phone] or email us at [email or web address]         Additional Information About Your Visit        [employer] Records Management Information     [employer] gives you secure access to your electronic health record. If you see a primary care provider, you can also send messages to your care team and make appointments. If you have questions, please call your primary care clinic.  If you do not have a primary care provider, please call [phone] and they will assist you.      [employer] is an electronic gateway that provides easy, online access to your medical records. With [employer], you can request a clinic appointment, read your test results, renew a prescription or communicate with your care team.     To access your existing account, please contact your [facility] Clinic or call [phone] for assistance.        Care EveryWhere ID     This is your Care EveryWhere ID. This could be used by other organizations to access your [place] medical records  [id number]        Your Vitals Were     Pulse Respirations Height BMI (Body Mass Index)          69 16 1.727 m (5' 8\") 22.05 kg/m2         Blood Pressure from Last 3 Encounters:   01/29/18 110/71   01/12/18 119/81   11/08/17 103/68    Weight from Last 3 Encounters:   01/29/18 65.8 kg (145 lb)   01/12/18 67.6 kg (149 lb) "   11/08/17 68.1 kg (150 lb 0.6 oz)              Today, you had the following     No orders found for display       Primary Care Provider Office Phone # Fax #    Eryn Caro [phone]       [address] [place] [address]        Equal Access to Services     JEANETTE ROBERTSON : Hadii aad ku hadasho Soomaali, waaxda luqadaha, qaybta kaalmada adeginoyada, alexa barronstevendash haskins . So [facility] [phone].    ATENCIÓN: Si habla español, tiene a robertson disposición servicios gratuitos de asistencia lingüística. [facility] [phone].    We comply with applicable federal civil rights laws and [place] laws. We do not discriminate on the basis of race, color, national origin, age, disability, sex, sexual orientation, or gender identity.            Thank you!     Thank you for choosing [facility]  for your care. Our goal is always to provide you with excellent care. Hearing back from our patients is one way we can continue to improve our services. Please take a few minutes to complete the written survey that you may receive in the mail after your visit with us. Thank you!             Your Updated Medication List - Protect others around you: Learn how to safely use, store and throw away your medicines at www.disposemymeds.org.          This list is accurate as of 1/29/18  7:53 AM.  Always use your most recent med list.                   Brand Name Dispense Instructions for use Diagnosis    acetaminophen 500 MG tablet    TYLENOL     Take 500-1,000 mg by mouth every 6 hours as needed for mild pain        CALCIUM + D PO      Take 1 tablet by mouth daily    Rosacea       escitalopram 10 MG tablet    LEXAPRO    90 tablet    Take 1 tablet (10 mg) by mouth daily    DEAN (generalized anxiety disorder)       estrogen (conjugated)-medroxyPROGESTERone 0.3-1.5 MG per tablet    PREMPRO    90 tablet    Take 1 tablet by mouth daily    Menopausal syndrome (hot  flashes)       GLUCOSAMINE 1500 COMPLEX PO      Take by mouth daily    Rosacea       HYDROcodone-acetaminophen 5-325 MG per tablet    NORCO    20 tablet    Take one po tid prn pain, max 3 per day    Cervicalgia       IBUPROFEN PO      Take 200 mg by mouth as needed for moderate pain        MV-ONE PO      Take 1 tablet by mouth daily    Rosacea       norethindrone-ethinyl estradiol 1-5 MG-MCG per tablet    FEMHRT 1/5    84 tablet    Take 1 tablet by mouth daily    Menopausal syndrome (hot flashes)       PROBIOTIC DAILY PO      Take by mouth daily        sulfacetamide sodium (Acne) 10 % lotion    KLARON    118 mL    Apply once at night    Rosacea

## 2018-01-30 RX ORDER — GABAPENTIN 100 MG/1
100 CAPSULE ORAL 3 TIMES DAILY
Qty: 90 CAPSULE | Refills: 1 | Status: SHIPPED | OUTPATIENT
Start: 2018-01-30 | End: 2019-05-08

## 2018-02-02 ENCOUNTER — THERAPY VISIT (OUTPATIENT)
Dept: PHYSICAL THERAPY | Facility: CLINIC | Age: 58
End: 2018-02-02
Payer: COMMERCIAL

## 2018-02-02 DIAGNOSIS — M54.2 CERVICAL PAIN: ICD-10-CM

## 2018-02-02 PROCEDURE — 97140 MANUAL THERAPY 1/> REGIONS: CPT | Mod: GP

## 2018-02-02 PROCEDURE — 97112 NEUROMUSCULAR REEDUCATION: CPT | Mod: GP

## 2018-02-02 PROCEDURE — 97110 THERAPEUTIC EXERCISES: CPT | Mod: GP

## 2018-03-04 NOTE — PROGRESS NOTES
Patient is a 57-year-old woman with a history of neck pain since January 2018.  The pain is on the left.  The pain is 5 out of 10 in severity.  There is a radicular component on the left.  She feels that her thumb is weak on the left and that she has dexterity issues.  The pain is described as burning/pressure/constant/dull ache.  The pain is worse at night.  The pain is alleviated by Vicodin use of a Thera cane, Aleve, physical therapy.  The pain is exacerbated by certain positions, sitting, and driving.  She has used several different modalities to treat her point pain these have included trigger point injections which were not helpful, steroid dose pack which was equivocal, is a good therapy which was equivocal, Hycodan which was helpful but sedating, and Aleve which was equivocal.  She has never tried gabapentin.  She presents today for initial evaluation.  Current Outpatient Prescriptions   Medication     gabapentin (NEURONTIN) 100 MG capsule     Probiotic Product (PROBIOTIC DAILY PO)     HYDROcodone-acetaminophen (NORCO) 5-325 MG per tablet     escitalopram (LEXAPRO) 10 MG tablet     sulfacetamide sodium, Acne, (KLARON) 10 % lotion     estrogen, conjugated,-medroxyPROGESTERone (PREMPRO) 0.3-1.5 MG per tablet     acetaminophen (TYLENOL) 500 MG tablet     IBUPROFEN PO     Multiple Vitamin (MV-ONE PO)     Glucosamine-Chondroit-Vit C-Mn (GLUCOSAMINE 1500 COMPLEX PO)     Calcium-Vitamin D (CALCIUM + D PO)     norethindrone-ethinyl estradiol (FEMHRT 1/5) 1-5 MG-MCG per tablet     No current facility-administered medications for this visit.      No Known Allergies  Past Medical History:   Diagnosis Date     Acne rosacea      ASCUS on Pap smear 08/06     Reactive airway disease      Past Surgical History:   Procedure Laterality Date     ARTHROSCOPY KNEE BILATERAL Bilateral 9/4/2015    Procedure: ARTHROSCOPY KNEE BILATERAL;  Surgeon: Tracie Stewart MD;  [facility]     EXCISE MASS UPPER EXTREMITY  10/09     right forearm lipoma     KNEE SURGERY  9/2015    Bilateral meniscectomies     Family History   Problem Relation Age of Onset     GASTROINTESTINAL DISEASE Mother      HEART DISEASE Mother      pacemaker     Hearing Loss Mother      Hypertension Mother      Heart Failure Mother      Arrhythmia Mother      Osteopenia Mother      Hearing Loss Father      Arthritis Father      Prostate Cancer Father      Eye Disorder Father      macular degeneration     Osteopenia Father      after tx for prostate cancer     Depression Paternal Grandmother      Social History     Social History     Marital status:      Spouse name: Luiz      Number of children: 2     Years of education: N/A     Occupational History     RN      [employer]     Social History Main Topics     Smoking status: Never Smoker     Smokeless tobacco: Never Used     Alcohol use Yes      Comment: 3 glasses wine per week     Drug use: No     Sexual activity: Yes     Partners: Male     Birth control/ protection: Post-menopausal     Other Topics Concern     Parent/Sibling W/ Cabg, Mi Or Angioplasty Before 65f 55m? No     Social History Narrative      ROS: 10 point ROS neg other than the symptoms noted above in the HPI.  Physical Exam   Constitutional: She is oriented to person, place, and time. She appears well-developed and well-nourished.   HENT:   Head: Normocephalic and atraumatic.   Right Ear: External ear normal.   Left Ear: External ear normal.   Nose: Nose normal.   Mouth/Throat: Oropharynx is clear and moist.   Eyes: Conjunctivae and EOM are normal. Pupils are equal, round, and reactive to light.   Neck: Normal range of motion. Neck supple.   Cardiovascular: Normal rate, regular rhythm, normal heart sounds and intact distal pulses.    Pulmonary/Chest: Effort normal and breath sounds normal.   Abdominal: Soft. Bowel sounds are normal.   Musculoskeletal:        Cervical back: She exhibits tenderness, pain and spasm. She exhibits normal  range of motion, no bony tenderness, no swelling, no edema, no deformity, no laceration and normal pulse.   Neurological: She is alert and oriented to person, place, and time. She has normal reflexes.   Skin: Skin is warm and dry.   Psychiatric: She has a normal mood and affect.     MR CERVICAL SPINE W/O CONTRAST 1/12/2018 6:42 PM     History: cervicalgia with radiaiton of pain to the left arm, hand .  left thumb numb, weakness; Cervicalgia     Comparison: None available.     Technique: Sagittal T1-weighted, sagittal T2-weighted, sagittal STIR,  axial T2-weighted, and axial T2* gradient echo images of the cervical  spine were obtained without intravenous contrast.     Findings: Normal cervical lordosis and vertebral alignment. Multilevel  disc degeneration. Disc height narrowing at C5-C6. Minimal opposing  endplate reactive marrow edema about the C5-6 and C6-7 interspaces.  Normal cervical cord signal and morphology. Paraspinous soft tissues  are normal.     Findings on a level by level basis:     C2-3: Right facet hypertrophy. No spinal canal or neural foraminal  stenosis.     C3-4:  Bilateral facet hypertrophy. No spinal canal or neural  foraminal stenosis     C4-5:  Bilateral facet hypertrophy. Right uncinate spurring. Moderate  right neural foraminal stenosis. No significant spinal canal stenosis.     C5-6: Dorsal osteophytic spur. Bilateral uncinate spurring and facet  hypertrophy. Moderate right and mild left neural foraminal stenosis.  No significant spinal canal stenosis.      C6-7: Tiny central disc protrusion. Uncinate spurring. No significant  spinal canal or neural foraminal stenosis.     C7-T1:  No spinal canal or neural foraminal stenosis.         Impression: Multilevel cervical spondylosis. Moderate neural foraminal  stenosis on the right C4-C6 and mild neural foraminal stenosis on the  left at C5-6. No significant spinal canal stenosis. No cord signal  abnormality.     I have personally reviewed the  examination and initial interpretation  and I agree with the findings.     TITUS ELLINGTON MD    A/P: Patient is a 57-year-old woman with a history of neck pain and radicular symptoms to the left.  MRI results are documented above.  Patient has tried some conservative measures without significant relief.  She has never tried gabapentin.  I would like to perform an epidural steroid injection in the cervical region.  And I would also like to start gabapentin 100 mg 3 times daily to determine if her symptoms can be improved with these modalities.  Patient verbalizes an understanding of our plan and will return for said procedure.    Answers for HPI/ROS submitted by the patient on 1/27/2018   DEAN 7 TOTAL SCORE: 0  PHQ-2 Score: 0  General Symptoms: No  Skin Symptoms: No  HENT Symptoms: No  EYE SYMPTOMS: No  HEART SYMPTOMS: No  LUNG SYMPTOMS: No  INTESTINAL SYMPTOMS: No  URINARY SYMPTOMS: No  GYNECOLOGIC SYMPTOMS: No  BREAST SYMPTOMS: No  SKELETAL SYMPTOMS: Yes  BLOOD SYMPTOMS: No  NERVOUS SYSTEM SYMPTOMS: No  MENTAL HEALTH SYMPTOMS: No  Back pain: No  Muscle aches: Yes  Neck pain: Yes  Swollen joints: No  Joint pain: Yes  Bone pain: No  Muscle cramps: No  Muscle weakness: Yes  Joint stiffness: Yes  Bone fracture: No

## 2018-03-07 ENCOUNTER — HOSPITAL ENCOUNTER (OUTPATIENT)
Facility: AMBULATORY SURGERY CENTER | Age: 58
End: 2018-03-07
Payer: COMMERCIAL

## 2018-03-07 ENCOUNTER — RADIANT APPOINTMENT (OUTPATIENT)
Dept: RADIOLOGY | Facility: AMBULATORY SURGERY CENTER | Age: 58
End: 2018-03-07
Payer: COMMERCIAL

## 2018-03-07 ENCOUNTER — SURGERY (OUTPATIENT)
Age: 58
End: 2018-03-07

## 2018-03-07 VITALS
OXYGEN SATURATION: 94 % | RESPIRATION RATE: 15 BRPM | TEMPERATURE: 98.8 F | DIASTOLIC BLOOD PRESSURE: 81 MMHG | HEART RATE: 74 BPM | SYSTOLIC BLOOD PRESSURE: 121 MMHG

## 2018-03-07 DIAGNOSIS — M54.2 CERVICAL SPINE PAIN: ICD-10-CM

## 2018-03-07 RX ORDER — IOPAMIDOL 408 MG/ML
INJECTION, SOLUTION INTRATHECAL PRN
Status: DISCONTINUED | OUTPATIENT
Start: 2018-03-07 | End: 2018-03-07 | Stop reason: HOSPADM

## 2018-03-07 RX ORDER — BETAMETHASONE SODIUM PHOSPHATE AND BETAMETHASONE ACETATE 3; 3 MG/ML; MG/ML
INJECTION, SUSPENSION INTRA-ARTICULAR; INTRALESIONAL; INTRAMUSCULAR; SOFT TISSUE PRN
Status: DISCONTINUED | OUTPATIENT
Start: 2018-03-07 | End: 2018-03-07 | Stop reason: HOSPADM

## 2018-03-07 RX ORDER — LIDOCAINE HYDROCHLORIDE 10 MG/ML
INJECTION, SOLUTION EPIDURAL; INFILTRATION; INTRACAUDAL; PERINEURAL PRN
Status: DISCONTINUED | OUTPATIENT
Start: 2018-03-07 | End: 2018-03-07 | Stop reason: HOSPADM

## 2018-03-07 RX ADMIN — LIDOCAINE HYDROCHLORIDE 5 ML: 10 INJECTION, SOLUTION EPIDURAL; INFILTRATION; INTRACAUDAL; PERINEURAL at 07:25

## 2018-03-07 RX ADMIN — BETAMETHASONE SODIUM PHOSPHATE AND BETAMETHASONE ACETATE 12 MG: 3; 3 INJECTION, SUSPENSION INTRA-ARTICULAR; INTRALESIONAL; INTRAMUSCULAR; SOFT TISSUE at 07:28

## 2018-03-07 RX ADMIN — IOPAMIDOL 3 ML: 408 INJECTION, SOLUTION INTRATHECAL at 07:29

## 2018-03-07 NOTE — DISCHARGE INSTRUCTIONS
Home Care Instructions after an Epidural Steroid Pain Injection    A lumbar epidural steroid injection delivers steroid medication directly into the area that may be causing your lower back pain and/or leg pain. A cervical or thoracic epidural steroid injection delivers steroids into the epidural space surrounding spinal nerve roots to help alleviate pain in the upper spine/neck.    Activity  -Rest today  -Do not work today  -Resume normal activity tomorrow  -DO NOT shower for 24 hours  -DO NOT remove bandaid for 24 hours    Pain  -You may experience soreness at the injection site for one or two days  -You may use an ice pack for 20 minutes every 2 hours for the first 24 hours  -You may use a heating pad after the first 24 hours  -You may use Tylenol (acetaminophen) every 4 hours or other pain medicines as     directed by your physician    You may experience numbness radiating into your legs or arms (depending on the procedure location). This numbness may last several hours. Until sensation returns to normal; please use caution in walking, climbing stairs, and stepping out of your vehicle, etc.    DID YOU RECEIVE SEDATION TODAY?  No    Common side effects of steroids:  Not everyone will experience corticosteroid side effects. If side effects are experienced, they will gradually subside in the 7-10 day period following an injection. Most common side effects include:  -Flushed face and/or chest  -Feeling of warmth, particularly in the face but could be an overall feeling of warmth  -Increased blood sugar in diabetic patients  -Menstrual irregularities my occur. If taking hormone-based birth control an alternate method of birth control is recommended  -Sleep disturbances and/or mood swings are possible  -Leg cramps    Please contact us if you have:  -Severe pain  -Fever more than 101.5 degrees Fahrenheit  -Signs of infection at the injection site (redness, swelling, or drainage)    If you have questions, please contact  our office at [phone] between the hours of 7:00 am and 3:00 pm Monday through Friday. After office hours you can contact the on call provider by dialing [phone]. If you need immediate attention, we recommend that you go to a hospital emergency room or dial [phone].

## 2018-03-07 NOTE — IP AVS SNAPSHOT
[facility]    [address] [place] [address]    Phone:  [phone]    Fax:  [phone]                                       After Visit Summary   3/7/2018    Barby Jensen    MRN: [id number]           After Visit Summary Signature Page     I have received my discharge instructions, and my questions have been answered. I have discussed any challenges I see with this plan with the nurse or doctor.    ..........................................................................................................................................  Patient/Patient Representative Signature      ..........................................................................................................................................  Patient Representative Print Name and Relationship to Patient    ..................................................               ................................................  Date                                            Time    ..........................................................................................................................................  Reviewed by Signature/Title    ...................................................              ..............................................  Date                                                            Time

## 2018-03-07 NOTE — PROCEDURES
Interlaminar Epidural Steroid Injection        Diagnosis:  Cervical Radiculopathy    The patient s identity, the procedure to be performed and the specific site of the procedure was verified in accordance with [facility] Protocol.      Pre-procedure Pain Score: 4/10    Procedure Note:    Informed consent was obtained.  The patient was placed comfortably into a prone position and was then prepped and draped in a sterile fashion.  There was no evidence of infection at the site of needle insertion.  The skin was anesthetized with 1% lidocaine and a tuohy needle was advanced under fluoroscopic guidance into the epidural space using a loss of resistance technique.  CSF was not aspirated, blood was not aspirated, paresthesia was not noted.  Position was confirmed with radio-opaque contrast.  The patient tolerated the procedure without complications.  The patient was observed for 30 minutes and was then released with post-procedure instructions.    The patient was given discharge instructions and verbalizes understanding, including understanding of those signs and symptoms that would require emergency care.     Level: C7/T1  Laterality: left paramedian    Needle Type:   18g touhy        Medication:  12 mg Celestone  2 ml Normal Saline,       Post Procedure Pain Score: 4/10      Counseling: Greater than 50% of this patient visit was spent in counseling the patient regarding the treatment of their pain, coordinating their overall treatment plan and assessing their progress.    Dr. Conteh performed this procedure in entirety.      I saw and examined the patient with the fellow and agree with the findings, assessment, and the plan as documented above.  I was present for the entire procedure.    Levi Conteh IV, MD

## 2018-03-07 NOTE — IP AVS SNAPSHOT
MRN:[id number]                      After Visit Summary   3/7/2018    Barby Jensen    MRN: [id number]           Thank you!     Thank you for choosing [facility] for your care. Our goal is always to provide you with excellent care. Hearing back from our patients is one way we can continue to improve our services. Please take a few minutes to complete the written survey that you may receive in the mail after you visit with us. Thank you!        Patient Information     Date Of Birth          1960        About your hospital stay     You were admitted on:  March 7, 2018 [facility]    You were discharged on:  March 7, 2018       Who to Call     For medical emergencies, please call 911.  For non-urgent questions about your medical care, please call your primary care provider or clinic, [phone]  For questions related to your surgery, please call your surgery clinic        Attending Provider     Provider Specialty    Levi Conteh MD Anesthesiology       Primary Care Provider Office Phone # Fax #    Eryn Caro [phone]      Further instructions from your care team       Home Care Instructions after an Epidural Steroid Pain Injection    A lumbar epidural steroid injection delivers steroid medication directly into the area that may be causing your lower back pain and/or leg pain. A cervical or thoracic epidural steroid injection delivers steroids into the epidural space surrounding spinal nerve roots to help alleviate pain in the upper spine/neck.    Activity  -Rest today  -Do not work today  -Resume normal activity tomorrow  -DO NOT shower for 24 hours  -DO NOT remove bandaid for 24 hours    Pain  -You may experience soreness at the injection site for one or two days  -You may use an ice pack for 20 minutes every 2 hours for the first 24 hours  -You may use a heating pad after the first 24 hours  -You  may use Tylenol (acetaminophen) every 4 hours or other pain medicines as     directed by your physician    You may experience numbness radiating into your legs or arms (depending on the procedure location). This numbness may last several hours. Until sensation returns to normal; please use caution in walking, climbing stairs, and stepping out of your vehicle, etc.    DID YOU RECEIVE SEDATION TODAY?  No    Common side effects of steroids:  Not everyone will experience corticosteroid side effects. If side effects are experienced, they will gradually subside in the 7-10 day period following an injection. Most common side effects include:  -Flushed face and/or chest  -Feeling of warmth, particularly in the face but could be an overall feeling of warmth  -Increased blood sugar in diabetic patients  -Menstrual irregularities my occur. If taking hormone-based birth control an alternate method of birth control is recommended  -Sleep disturbances and/or mood swings are possible  -Leg cramps    Please contact us if you have:  -Severe pain  -Fever more than 101.5 degrees Fahrenheit  -Signs of infection at the injection site (redness, swelling, or drainage)    If you have questions, please contact our office at [phone] between the hours of 7:00 am and 3:00 pm Monday through Friday. After office hours you can contact the on call provider by dialing [phone]. If you need immediate attention, we recommend that you go to a hospital emergency room or dial [phone].      Pending Results     Date and Time Order Name Status Description    3/7/2018 0653 XR SURGERY KELVIN FLUORO LESS THAN 5 MIN W STILLS In process             Admission Information     Date & Time Provider Department Dept. Phone    3/7/2018 Levi Conteh MD [facility] [phone]      Your Vitals Were     Blood Pressure Pulse Temperature Respirations Pulse Oximetry       119/71 64 98  F (36.7  C) (Tympanic) 16 100%       MyChart  Information     [employer] gives you secure access to your electronic health record. If you see a primary care provider, you can also send messages to your care team and make appointments. If you have questions, please call your primary care clinic.  If you do not have a primary care provider, please call [phone] and they will assist you.      [employer] is an electronic gateway that provides easy, online access to your medical records. With [employer], you can request a clinic appointment, read your test results, renew a prescription or communicate with your care team.     To access your existing account, please contact your [facility] or call [phone] for assistance.        Care EveryWhere ID     This is your Care EveryWhere ID. This could be used by other organizations to access your [facility] medical records  [id number]        Equal Access to Services     JEANETTE ROBERTSON : Gwendolyn Pringle, kari funes, pedro pablo deshpandealkayden metcalf, alexa bacon. So [facility] [place] [facility] [phone].    ATENCIÓN: Si habla español, tiene a robertson disposición servicios gratuitos de asistencia lingüística. Llame al [phone].    We comply with applicable federal civil rights laws and [place] laws. We do not discriminate on the basis of race, color, national origin, age, disability, sex, sexual orientation, or gender identity.               Review of your medicines      UNREVIEWED medicines. Ask your doctor about these medicines        Dose / Directions    acetaminophen 500 MG tablet   Commonly known as:  TYLENOL        Dose:  500-1000 mg   Take 500-1,000 mg by mouth every 6 hours as needed for mild pain   Refills:  0       CALCIUM + D PO   Used for:  Rosacea        Dose:  1 tablet   Take 1 tablet by mouth daily   Refills:  0       escitalopram 10 MG tablet   Commonly known as:  LEXAPRO   Used for:  DEAN (generalized anxiety disorder)        Dose:  10 mg   Take 1 tablet (10 mg)  by mouth daily   Quantity:  90 tablet   Refills:  0       estrogen (conjugated)-medroxyPROGESTERone 0.3-1.5 MG per tablet   Commonly known as:  PREMPRO   Used for:  Menopausal syndrome (hot flashes)        Dose:  1 tablet   Take 1 tablet by mouth daily   Quantity:  90 tablet   Refills:  3       gabapentin 100 MG capsule   Commonly known as:  NEURONTIN   Used for:  Cervical radiculopathy        Dose:  100 mg   Take 1 capsule (100 mg) by mouth 3 times daily   Quantity:  90 capsule   Refills:  1       GLUCOSAMINE 1500 COMPLEX PO   Used for:  Rosacea        Take by mouth daily   Refills:  0       HYDROcodone-acetaminophen 5-325 MG per tablet   Commonly known as:  NORCO   Used for:  Cervicalgia        Take one po tid prn pain, max 3 per day   Quantity:  20 tablet   Refills:  0       IBUPROFEN PO        Dose:  200 mg   Take 200 mg by mouth as needed for moderate pain   Refills:  0       MV-ONE PO   Used for:  Rosacea        Dose:  1 tablet   Take 1 tablet by mouth daily   Refills:  0       norethindrone-ethinyl estradiol 1-5 MG-MCG per tablet   Commonly known as:  FEMHRT 1/5   Used for:  Menopausal syndrome (hot flashes)        Dose:  1 tablet   Take 1 tablet by mouth daily   Quantity:  84 tablet   Refills:  3       PROBIOTIC DAILY PO        Take by mouth daily   Refills:  0       sulfacetamide sodium (Acne) 10 % lotion   Commonly known as:  KLARON   Used for:  Rosacea        Apply once at night   Quantity:  118 mL   Refills:  3                Protect others around you: Learn how to safely use, store and throw away your medicines at www.disposemymeds.org.             Medication List: This is a list of all your medications and when to take them. Check marks below indicate your daily home schedule. Keep this list as a reference.      Medications           Morning Afternoon Evening Bedtime As Needed    acetaminophen 500 MG tablet   Commonly known as:  TYLENOL   Take 500-1,000 mg by mouth every 6 hours as needed for mild pain                                 CALCIUM + D PO   Take 1 tablet by mouth daily                                escitalopram 10 MG tablet   Commonly known as:  LEXAPRO   Take 1 tablet (10 mg) by mouth daily                                estrogen (conjugated)-medroxyPROGESTERone 0.3-1.5 MG per tablet   Commonly known as:  PREMPRO   Take 1 tablet by mouth daily                                gabapentin 100 MG capsule   Commonly known as:  NEURONTIN   Take 1 capsule (100 mg) by mouth 3 times daily                                GLUCOSAMINE 1500 COMPLEX PO   Take by mouth daily                                HYDROcodone-acetaminophen 5-325 MG per tablet   Commonly known as:  NORCO   Take one po tid prn pain, max 3 per day                                IBUPROFEN PO   Take 200 mg by mouth as needed for moderate pain                                MV-ONE PO   Take 1 tablet by mouth daily                                norethindrone-ethinyl estradiol 1-5 MG-MCG per tablet   Commonly known as:  FEMHRT 1/5   Take 1 tablet by mouth daily                                PROBIOTIC DAILY PO   Take by mouth daily                                sulfacetamide sodium (Acne) 10 % lotion   Commonly known as:  KLARON   Apply once at night

## 2018-03-08 DIAGNOSIS — F41.1 GAD (GENERALIZED ANXIETY DISORDER): ICD-10-CM

## 2018-03-09 RX ORDER — ESCITALOPRAM OXALATE 10 MG/1
10 TABLET ORAL DAILY
Qty: 90 TABLET | Refills: 1 | Status: SHIPPED | OUTPATIENT
Start: 2018-03-09 | End: 2018-10-04

## 2018-03-09 NOTE — TELEPHONE ENCOUNTER
Prescription approved per [facility] Refill Protocol.      Faith Gray RN  March 9, 2018 11:17 AM

## 2018-04-17 ENCOUNTER — CARE COORDINATION (OUTPATIENT)
Dept: ANESTHESIOLOGY | Facility: CLINIC | Age: 58
End: 2018-04-17

## 2018-04-17 NOTE — PROGRESS NOTES
Purpose of call: Follow up after cervical interlaminar SANDRA   Date of service: 03/07/2018  Spoke with: Yoanna    Location of symptoms: neck and shoulder pain, greater on left side. Numbness of left thumb    Pain rated 4/10 before injection  Pain rated 0/10 post injection    Numbness of left thumb remains unchanged.     Follow up: Yoanna has followed up and discussed her symptoms with Dr. oCnteh.

## 2018-10-04 DIAGNOSIS — F41.1 GAD (GENERALIZED ANXIETY DISORDER): ICD-10-CM

## 2018-10-04 NOTE — TELEPHONE ENCOUNTER
Last office visit:  01/12/2018, no future appointment.  Needs follow-up in the clinic.    Prescription approved per [facility] Refill Protocol.  Liliana Price RN  Care Coordinator  [facility]

## 2018-10-05 RX ORDER — ESCITALOPRAM OXALATE 10 MG/1
10 TABLET ORAL DAILY
Qty: 90 TABLET | Refills: 1 | Status: SHIPPED | OUTPATIENT
Start: 2018-10-05 | End: 2019-04-11

## 2018-11-26 DIAGNOSIS — N95.1 MENOPAUSAL SYNDROME (HOT FLASHES): ICD-10-CM

## 2018-11-26 RX ORDER — NORETHINDRONE ACETATE AND ETHINYL ESTRADIOL 1; 5 MG/1; UG/1
1 TABLET ORAL DAILY
Qty: 84 TABLET | Refills: 0 | Status: SHIPPED | OUTPATIENT
Start: 2018-11-26 | End: 2019-02-21

## 2018-11-26 NOTE — TELEPHONE ENCOUNTER
Last time prescribed: 11/8/17 84 tabs x 3 refills  Last office visit: 1/12/18   Next appointment: no future appointment      Prescription approved per [facility] Refill Protocol.      Faith Gray RN  November 26, 2018 11:22 AM

## 2019-01-29 ENCOUNTER — ANCILLARY PROCEDURE (OUTPATIENT)
Dept: MAMMOGRAPHY | Facility: CLINIC | Age: 59
End: 2019-01-29
Payer: COMMERCIAL

## 2019-01-29 DIAGNOSIS — Z12.31 VISIT FOR SCREENING MAMMOGRAM: ICD-10-CM

## 2019-02-21 DIAGNOSIS — N95.1 MENOPAUSAL SYNDROME (HOT FLASHES): ICD-10-CM

## 2019-02-21 RX ORDER — NORETHINDRONE ACETATE AND ETHINYL ESTRADIOL 1; 5 MG/1; UG/1
1 TABLET ORAL DAILY
Qty: 84 TABLET | Refills: 0 | Status: SHIPPED | OUTPATIENT
Start: 2019-02-21 | End: 2019-05-08

## 2019-02-21 NOTE — TELEPHONE ENCOUNTER
Last visit 1/12/18, no future visit    Medication is being filled for 1 time refill only due to:  Patient needs labs needs pap. Patient needs to be seen because it has been more than one year since last visit.     Liliana Price RN  [facility]

## 2019-04-10 DIAGNOSIS — F41.1 GAD (GENERALIZED ANXIETY DISORDER): ICD-10-CM

## 2019-04-10 NOTE — TELEPHONE ENCOUNTER
Last time prescribed: 10/5/18 , 90 tabs/caps x 1 refills  Last office visit: 1/12/18  Next appointment: 5/8/19    Medication is being filled for 1 time refill only due to:  Patient needs to be seen because it has been more than one year since last visit.     Janie Antony RN  04/11/19  10:49 AM

## 2019-04-11 RX ORDER — ESCITALOPRAM OXALATE 10 MG/1
10 TABLET ORAL DAILY
Qty: 30 TABLET | Refills: 0 | Status: SHIPPED | OUTPATIENT
Start: 2019-04-11 | End: 2019-05-08

## 2019-04-30 NOTE — PROGRESS NOTES
Barby Jensen is here for a general check up. She is fasting. She is up to date on eye exams (glasses) and dental visits. Wears seat belt-yes. Bike helmet- yes.   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 She is due for routine pap. Mammogram was normal in Jan 2019. Colon cancer screening due this year. DEXA in 2017 showed lowest T of -1.3 in her lumbar spine, otherwise she had normal T scores. Immunizations are up to date, discussed shingles vaccine when available. Due for HIV screening, accepted. She has an advanced care directive on file.    Diet: Eats a healthy diet, cooking most meals at home. Would like to lose about 10 pounds.    Anxiety  Lexapro 10mg daily, working well. She has tried to stop the medication, but her anxiety increased so she has continued. She is due for med refills.     PHQ9 score = 0  DEAN 7 score = 0    Menopausal syndrome (hot flashes)  She is on FemHRT, which works very well for her hot flashes. She denies any breakthrough symptoms. No vaginal bleeding. She reports that she has tried to wean herself off the medication without success.    Bilateral knee pain  She had a bilateral meniscectomy in 2015 with Dr. Stewart. She has been on glucosamine chondroitin for several years, which had been working well to decrease her pain. However, she had not been exercising regularly, and recently when she increased her activity the knee pain worsened. She denies any swelling. She has been treating the pain with ibuprofen. She has an appointment scheduled with Dr. Stewart to discuss this.    Health Maintenance   Topic Date Due     ZOSTER IMMUNIZATION (2 of 3) 11/10/2016     PREVENTIVE CARE VISIT  09/12/2017     PHQ-9 Q1YR  01/12/2019     DEAN QUESTIONNAIRE 1 YEAR  01/29/2019     COLON CANCER SCREEN (SYSTEM ASSIGNED)  08/19/2019     ADVANCE DIRECTIVE PLANNING Q5 YRS  09/04/2020     MAMMO SCREEN Q2 YR (SYSTEM ASSIGNED)  01/29/2021      DTAP/TDAP/TD IMMUNIZATION (4 - Td) 12/05/2022     PAP Q5 YEARS  05/08/2024     HPV Q5 YEARS (Complete with PAP)  05/08/2024     LIPID SCREEN Q5 YR FEMALE (SYSTEM ASSIGNED)  05/08/2024     HIV SCREEN (SYSTEM ASSIGNED)  Completed     INFLUENZA VACCINE  Completed     HEPATITIS C SCREENING  Completed     IPV IMMUNIZATION  Aged Out     MENINGITIS IMMUNIZATION  Aged Out         Patient Active Problem List   Diagnosis     CARDIOVASCULAR SCREENING; LDL GOAL LESS THAN 130     DEAN (generalized anxiety disorder)     Rosacea     Chronic pain of both knees     Hot flashes     Cervical pain       Past Surgical History:   Procedure Laterality Date     ARTHROSCOPY KNEE BILATERAL Bilateral 9/4/2015    Procedure: ARTHROSCOPY KNEE BILATERAL;  Surgeon: Tracie Stewart MD;  Location: [facility]     EXCISE MASS UPPER EXTREMITY  10/09    right forearm lipoma     INJECT EPIDURAL CERVICAL / THORACIC SINGLE N/A 3/7/2018    Procedure: INJECT EPIDURAL CERVICAL / THORACIC SINGLE;  Cervical Epidural Steroid Injectioncervical 7, throasic 1;  Surgeon: Levi Conteh MD;  Location: [facility]     KNEE SURGERY  9/2015    Bilateral meniscectomies       Family History   Problem Relation Age of Onset     Gastrointestinal Disease Mother      Heart Disease Mother         pacemaker     Hearing Loss Mother      Hypertension Mother      Heart Failure Mother      Arrhythmia Mother      Osteopenia Mother      Hearing Loss Father      Arthritis Father      Prostate Cancer Father      Eye Disorder Father         macular degeneration     Osteopenia Father         after tx for prostate cancer     Depression Paternal Grandmother      Social  , 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  No, last pap 2014, normal    STD hx?  none  cycle length:  na  dysmenorrhea/PMS:  na  Vasomotor sx:  No,  HRT  Contraception: na  G 2   P 2   A 0  Self Breast exam:  yes    Current Outpatient Medications   Medication Sig Dispense Refill     acetaminophen (TYLENOL) 500 MG tablet Take 500-1,000 mg by mouth every 6 hours as needed for mild pain       Calcium-Vitamin D (CALCIUM + D PO) Take 1 tablet by mouth daily        escitalopram (LEXAPRO) 10 MG tablet Take 1 tablet (10 mg) by mouth daily 30 tablet 0     estrogen, conjugated,-medroxyPROGESTERone (PREMPRO) 0.3-1.5 MG per tablet Take 1 tablet by mouth daily 90 tablet 3     gabapentin (NEURONTIN) 100 MG capsule Take 1 capsule (100 mg) by mouth 3 times daily 90 capsule 1     Glucosamine-Chondroit-Vit C-Mn (GLUCOSAMINE 1500 COMPLEX PO) Take by mouth daily        HYDROcodone-acetaminophen (NORCO) 5-325 MG per tablet Take one po tid prn pain, max 3 per day 20 tablet 0     IBUPROFEN PO Take 200 mg by mouth as needed for moderate pain        Multiple Vitamin (MV-ONE PO) Take 1 tablet by mouth daily        norethindrone-ethinyl estradiol (FEMHRT 1/5) 1-5 MG-MCG tablet Take 1 tablet by mouth daily Needs clinic visit and pap for further refills 84 tablet 0     Probiotic Product (PROBIOTIC DAILY PO) Take by mouth daily       sulfacetamide sodium, Acne, (KLARON) 10 % lotion Apply once at night 118 mL 3     No Known Allergies      ROS  CONSTITUTIONAL:NEGATIVE for fever, chills, change in weight  INTEGUMENTARY/SKIN: NEGATIVE for worrisome rashes, moles or lesions  EYES: NEGATIVE for vision changes or irritation  ENT/MOUTH: NEGATIVE for ear, mouth and throat problems  RESP:NEGATIVE for significant cough or SOB  BREAST: NEGATIVE for masses, tenderness or discharge  CV: NEGATIVE for chest pain, palpitations, CARPENTER, orthopnea, PND  or peripheral edema  GI: NEGATIVE for nausea, abdominal pain, heartburn, or change in bowel habits  :NEGATIVE for frequency, dysuria, or hematuria  MUSCULOSKELETAL:NEGATIVE for  "significant arthralgias or myalgia  NEURO: NEGATIVE for weakness, dizziness or paresthesias  ENDOCRINE: NEGATIVE for polyuria/dipsia,  temperature intolerance, skin/hair changes  HEME/ALLERGY/IMMUNE: NEGATIVE for bleeding problems  PSYCHIATRIC: NEGATIVE for changes in mood or affect, anxiety well controlled    EXAM  /78 (BP Location: Right arm, Patient Position: Sitting, Cuff Size: Adult Regular)   Pulse 72   Temp 98.1  F (36.7  C) (Oral)   Ht 1.728 m (5' 8.03\")   Wt 71.4 kg (157 lb 8 oz)   SpO2 98%   BMI 23.93 kg/m    GENERAL APPEARANCE: Alert, pleasant, NAD  EYES: PERRL, EOMI, conjunctiva clear  HENT: TM normal bilaterally. Nose and mouth without lesions  NECK: no adenopathy, thyroid normal to palpation  RESP: lungs clear to auscultation bilaterally  BREAST: normal without masses, no tenderness or nipple discharge and no palpable  axillary masses or adenopathy   CV: regular rate and rhythm, normal S1 S2, no murmur, no carotid bruits  ABDOMEN: soft, nontender, without HSM or masses. Bowel sounds normal  : External genitalia without lesions, cervix normal, pap easily obtained, no abnormal discharge, bimanual exam without adnexal masses or tenderness, uterus non enlarged  RECTAL EXAM: not done  MS: extremities normal- no gross deformities noted, no tender, hot or swollen joints.   SKIN: no suspicious lesions or rashes  NEURO: Normal strength and tone, sensory exam grossly normal, DTR normoreflexive in upper and lower extremities  PSYCH: mentation appears normal. and affect normal/bright.  EXT: no peripheral edema, pedal pulses palpable    Assessment:  (Z00.00) Routine general medical examination at a health care facility  (primary encounter diagnosis)  Comment: Healthy 58 year old female  Plan: HIV Antigen Antibody Combo, Multiple         Vitamins-Minerals (PRESERVISION AREDS) CAPS,         CBC with platelets        Anticipatory guidance given today regarding diet, exercise and calcium intake, " safety.    (Z12.4) Screening for malignant neoplasm of cervix  Comment: due for routine pap  Plan: Pap imaged thin layer screen with HPV -         recommended age 30 - 65 years (select HPV order        below), HPV High Risk Types DNA Cervical        Pap is normal, HPV negative, repeat in 5 yr    (Z13.220) Lipid screening  Comment: fasting  Plan: Lipid Panel ([place])          Recent Labs   Lab Test 05/08/19  0839 09/12/16  1342 09/20/13  0908   CHOL 187.0 215* 188   HDL 57.0 68 62   .0 132* 113   TRIG 83.0 74 61   CHOLHDLRATIO 3.3  --  3.0         (Z12.11) Screening for colon cancer  Comment: routine screening  Plan: GASTROENTEROLOGY ADULT REF PROCEDURE ONLY         [facility] [phone]        Referral is given    (N95.1) Menopausal syndrome (hot flashes)  Comment: doing well with FemHRT  Plan: norethindrone-ethinyl estradiol (FEMHRT 1/5)         1-5 MG-MCG tablet        Refilled x 1 yr    (F41.1) DEAN (generalized anxiety disorder)  Comment: stable  Plan: escitalopram (LEXAPRO) 10 MG tablet        Refilled x 1 year      Eryn Caro MD  Internal Medicine    I, Allison Ca, am serving as a scribe to document services personally performed by Dr. Eryn Caro, based on data collection and the provider's statements to me. Dr. Caro has reviewed, edited, and approved the above note.

## 2019-05-08 ENCOUNTER — OFFICE VISIT (OUTPATIENT)
Dept: FAMILY MEDICINE | Facility: CLINIC | Age: 59
End: 2019-05-08
Payer: COMMERCIAL

## 2019-05-08 VITALS
BODY MASS INDEX: 23.87 KG/M2 | DIASTOLIC BLOOD PRESSURE: 78 MMHG | OXYGEN SATURATION: 98 % | WEIGHT: 157.5 LBS | SYSTOLIC BLOOD PRESSURE: 110 MMHG | HEART RATE: 72 BPM | TEMPERATURE: 98.1 F | HEIGHT: 68 IN

## 2019-05-08 DIAGNOSIS — Z13.220 LIPID SCREENING: ICD-10-CM

## 2019-05-08 DIAGNOSIS — Z12.4 SCREENING FOR MALIGNANT NEOPLASM OF CERVIX: ICD-10-CM

## 2019-05-08 DIAGNOSIS — F41.1 GAD (GENERALIZED ANXIETY DISORDER): ICD-10-CM

## 2019-05-08 DIAGNOSIS — Z12.11 SCREENING FOR COLON CANCER: ICD-10-CM

## 2019-05-08 DIAGNOSIS — N95.1 MENOPAUSAL SYNDROME (HOT FLASHES): ICD-10-CM

## 2019-05-08 DIAGNOSIS — Z00.00 ROUTINE GENERAL MEDICAL EXAMINATION AT A HEALTH CARE FACILITY: Primary | ICD-10-CM

## 2019-05-08 LAB
CHOLEST SERPL-MCNC: 187 MG/DL (ref 0–200)
CHOLEST/HDLC SERPL: 3.3 {RATIO} (ref 0–5)
FASTING SPECIMEN: YES
HDLC SERPL-MCNC: 57 MG/DL
LDLC SERPL CALC-MCNC: 113 MG/DL (ref 0–129)
TRIGL SERPL-MCNC: 83 MG/DL (ref 0–150)
VLDL-CHOLESTEROL: 17 (ref 7–32)

## 2019-05-08 RX ORDER — ESCITALOPRAM OXALATE 10 MG/1
10 TABLET ORAL DAILY
Qty: 90 TABLET | Refills: 3 | Status: SHIPPED | OUTPATIENT
Start: 2019-05-08 | End: 2020-06-03

## 2019-05-08 RX ORDER — VIT A/VIT C/VIT E/ZINC/COPPER 4296-226
CAPSULE ORAL
Qty: 90 CAPSULE | Refills: 3 | Status: SHIPPED | OUTPATIENT
Start: 2019-05-08

## 2019-05-08 RX ORDER — NORETHINDRONE ACETATE AND ETHINYL ESTRADIOL 1; 5 MG/1; UG/1
1 TABLET ORAL DAILY
Qty: 84 TABLET | Refills: 3 | Status: SHIPPED | OUTPATIENT
Start: 2019-05-08 | End: 2020-06-02

## 2019-05-08 ASSESSMENT — MIFFLIN-ST. JEOR: SCORE: 1338.41

## 2019-05-08 NOTE — NURSING NOTE
"58 year old  Chief Complaint   Patient presents with     Physical     and referral for colonoscopy        Blood pressure 110/78, pulse 72, temperature 98.1  F (36.7  C), temperature source Oral, height 1.728 m (5' 8.03\"), weight 71.4 kg (157 lb 8 oz), SpO2 98 %. Body mass index is 23.93 kg/m .  Patient Active Problem List   Diagnosis     CARDIOVASCULAR SCREENING; LDL GOAL LESS THAN 130     DEAN (generalized anxiety disorder)     Rosacea     Chronic pain of both knees     Hot flashes     Cervical pain       Wt Readings from Last 2 Encounters:   05/08/19 71.4 kg (157 lb 8 oz)   01/29/18 65.8 kg (145 lb)     BP Readings from Last 3 Encounters:   05/08/19 110/78   03/07/18 121/81   01/29/18 110/71         Current Outpatient Medications   Medication     acetaminophen (TYLENOL) 500 MG tablet     Calcium-Vitamin D (CALCIUM + D PO)     escitalopram (LEXAPRO) 10 MG tablet     estrogen, conjugated,-medroxyPROGESTERone (PREMPRO) 0.3-1.5 MG per tablet     Glucosamine-Chondroit-Vit C-Mn (GLUCOSAMINE 1500 COMPLEX PO)     IBUPROFEN PO     Multiple Vitamin (MV-ONE PO)     norethindrone-ethinyl estradiol (FEMHRT 1/5) 1-5 MG-MCG tablet     sulfacetamide sodium, Acne, (KLARON) 10 % lotion     gabapentin (NEURONTIN) 100 MG capsule     HYDROcodone-acetaminophen (NORCO) 5-325 MG per tablet     Probiotic Product (PROBIOTIC DAILY PO)     No current facility-administered medications for this visit.        Social History     Tobacco Use     Smoking status: Never Smoker     Smokeless tobacco: Never Used   Substance Use Topics     Alcohol use: Yes     Comment: 3 glasses wine per week     Drug use: No       Health Maintenance Due   Topic Date Due     URINE DRUG SCREEN Q1 YR  06/04/1974     HIV SCREEN (SYSTEM ASSIGNED)  06/04/1977     ZOSTER IMMUNIZATION (2 of 3) 11/10/2016     PREVENTIVE CARE VISIT  09/12/2017     PHQ-9 Q1YR  01/12/2019     DEAN QUESTIONNAIRE 1 YEAR  01/29/2019     PAP Q5 YEARS  03/26/2019     HPV Q5 YEARS (Complete with PAP)  " 03/26/2019       Lab Results   Component Value Date    PAP NIL 03/26/2014         May 8, 2019 8:04 AM

## 2019-05-09 LAB
ERYTHROCYTE [DISTWIDTH] IN BLOOD BY AUTOMATED COUNT: 12.4 %
HCT VFR BLD AUTO: 43.2 % (ref 35–47)
HEMOGLOBIN: 14.6 G/DL (ref 11.7–15.7)
HIV 1+2 AB+HIV1 P24 AG SERPL QL IA: NONREACTIVE
MCH RBC QN AUTO: 31.3 PG (ref 26.5–35)
MCHC RBC AUTO-ENTMCNC: 33.8 G/DL (ref 32–36)
MCV RBC AUTO: 92.7 FL (ref 78–100)
PLATELET # BLD AUTO: 191 K/UL (ref 150–450)
RBC # BLD AUTO: 4.66 M/UL (ref 3.8–5.2)
WBC # BLD AUTO: 4.3 K/UL (ref 4–11)

## 2019-05-09 NOTE — TELEPHONE ENCOUNTER
RECORDS RECEIVED FROM: Bilateral Knee Pain,no outside records,DOS with  9/4/15,appt per pt   DATE RECEIVED: May 21, 2019    NOTES STATUS DETAILS   OFFICE NOTE from referring provider Internal Dr. Baumann 10/6/15   OFFICE NOTE from other specialist Internal Dr. Caro 5/8/19    DISCHARGE SUMMARY from hospital N/A    DISCHARGE REPORT from the ER N/A    OPERATIVE REPORT Internal 9/4/15   MEDICATION LIST Internal    IMPLANT RECORD/STICKER N/A    LABS     CBC/DIFF N/A    CULTURES N/A    INJECTIONS DONE IN RADIOLOGY N/A    MRI Internal 8/18/10   CT SCAN N/A    XRAYS (IMAGES & REPORTS) Internal 4/15/15...   TUMOR     PATHOLOGY  Slides & report N/A

## 2019-05-10 LAB
COPATH REPORT: NORMAL
PAP: NORMAL

## 2019-05-14 LAB
FINAL DIAGNOSIS: NORMAL
HPV HR 12 DNA CVX QL NAA+PROBE: NEGATIVE
HPV16 DNA SPEC QL NAA+PROBE: NEGATIVE
HPV18 DNA SPEC QL NAA+PROBE: NEGATIVE
SPECIMEN DESCRIPTION: NORMAL
SPECIMEN SOURCE CVX/VAG CYTO: NORMAL

## 2019-05-17 DIAGNOSIS — Z98.890 S/P KNEE SURGERY: Primary | ICD-10-CM

## 2019-05-20 ASSESSMENT — KOOS JR: HOW SEVERE IS YOUR KNEE STIFFNESS AFTER FIRST WAKING IN MORNING: MILD

## 2019-05-20 ASSESSMENT — ACTIVITIES OF DAILY LIVING (ADL): FUNCTION,_DAILY_LIVING_SCORE: 86.76

## 2019-05-20 ASSESSMENT — ENCOUNTER SYMPTOMS
NECK PAIN: 0
JOINT SWELLING: 0
ARTHRALGIAS: 1
MUSCLE WEAKNESS: 0
MUSCLE CRAMPS: 0
MYALGIAS: 1
STIFFNESS: 1
BACK PAIN: 0

## 2019-05-21 ENCOUNTER — ANCILLARY PROCEDURE (OUTPATIENT)
Dept: GENERAL RADIOLOGY | Facility: CLINIC | Age: 59
End: 2019-05-21
Attending: ORTHOPAEDIC SURGERY
Payer: COMMERCIAL

## 2019-05-21 ENCOUNTER — PRE VISIT (OUTPATIENT)
Dept: ORTHOPEDICS | Facility: CLINIC | Age: 59
End: 2019-05-21

## 2019-05-21 ENCOUNTER — OFFICE VISIT (OUTPATIENT)
Dept: ORTHOPEDICS | Facility: CLINIC | Age: 59
End: 2019-05-21
Payer: COMMERCIAL

## 2019-05-21 DIAGNOSIS — Z98.890 S/P KNEE SURGERY: ICD-10-CM

## 2019-05-21 DIAGNOSIS — M17.11 ARTHRITIS OF RIGHT KNEE: ICD-10-CM

## 2019-05-21 DIAGNOSIS — M17.12 ARTHRITIS OF LEFT KNEE: Primary | ICD-10-CM

## 2019-05-21 RX ORDER — LIDOCAINE HYDROCHLORIDE 10 MG/ML
9 INJECTION, SOLUTION EPIDURAL; INFILTRATION; INTRACAUDAL; PERINEURAL
Status: DISCONTINUED | OUTPATIENT
Start: 2019-05-21 | End: 2020-10-16

## 2019-05-21 RX ORDER — TRIAMCINOLONE ACETONIDE 40 MG/ML
40 INJECTION, SUSPENSION INTRA-ARTICULAR; INTRAMUSCULAR
Status: DISCONTINUED | OUTPATIENT
Start: 2019-05-21 | End: 2020-10-16

## 2019-05-21 RX ADMIN — TRIAMCINOLONE ACETONIDE 40 MG: 40 INJECTION, SUSPENSION INTRA-ARTICULAR; INTRAMUSCULAR at 09:48

## 2019-05-21 RX ADMIN — LIDOCAINE HYDROCHLORIDE 9 ML: 10 INJECTION, SOLUTION EPIDURAL; INFILTRATION; INTRACAUDAL; PERINEURAL at 09:48

## 2019-05-21 ASSESSMENT — PATIENT HEALTH QUESTIONNAIRE - PHQ9
SUM OF ALL RESPONSES TO PHQ QUESTIONS 1-9: 0
5. POOR APPETITE OR OVEREATING: NOT AT ALL

## 2019-05-21 ASSESSMENT — ANXIETY QUESTIONNAIRES
1. FEELING NERVOUS, ANXIOUS, OR ON EDGE: NOT AT ALL
7. FEELING AFRAID AS IF SOMETHING AWFUL MIGHT HAPPEN: NOT AT ALL
IF YOU CHECKED OFF ANY PROBLEMS ON THIS QUESTIONNAIRE, HOW DIFFICULT HAVE THESE PROBLEMS MADE IT FOR YOU TO DO YOUR WORK, TAKE CARE OF THINGS AT HOME, OR GET ALONG WITH OTHER PEOPLE: NOT DIFFICULT AT ALL
6. BECOMING EASILY ANNOYED OR IRRITABLE: NOT AT ALL
5. BEING SO RESTLESS THAT IT IS HARD TO SIT STILL: NOT AT ALL
2. NOT BEING ABLE TO STOP OR CONTROL WORRYING: NOT AT ALL
GAD7 TOTAL SCORE: 0
3. WORRYING TOO MUCH ABOUT DIFFERENT THINGS: NOT AT ALL

## 2019-05-21 NOTE — PROGRESS NOTES
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  Medial compartment knee pain began when the patient began exercising again earlier this year.    -We discussed the options with the patient.  They would include surgery versus nonoperative management.  Nonoperative management would include symptomatic management with over-the-counter anti-inflammatories, bracing as needed, activity modification, and corticosteroid injection.  The patient would like to proceed with bilateral knee steroid injections.  -We discussed with the patient that she may be a candidate for medial unicompartmental knee arthroplasties.  She does have 11 degrees of varus on the right, which may be a contraindication.  She likely would be a good candidate on the left.  -The patient will use a right sided medial  brace.  We also ordered oral diclofenac for her.  -Patient will follow-up with Dr. Martin as needed going forward.  We did discuss that if she would like to discuss unicompartmental knee arthroplasties, then Dr. Lau or Dr. Senior would be good options for follow-up.  Their contact information was provided.      The patient was seen and discussed with staff surgeon Dr. Stewart who was in agreement with the above assessment and plan.     Room time: 25 min, Consultation time: 20 min.     Keny Frausto, PGY-4  Orthopaedic Surgery    I have personally examined this patient and have reviewed the clinical presentation and  progress note with the resident.  I agree with the treatment plan as outlined.  The plan was formulated with the resident on the day of the resident dictation.    Tracie Stewart           HISTORY OF PRESENT ILLNESS:  Barby Jensen is a 58 year old female who has long-standing history of atraumatic bilateral knee pain.  In 2015 she underwent bilateral knee arthroscopies which showed degenerative change in the medial compartments of her knees bilaterally.  She did very well after the surgery, and her knee pain improved significantly.  She reports that she gained a small amount of weight over the winter, and when she attempted to begin exercising again in January/February, she began to notice bilateral knee pain again.  She reports the pain is even between the 2 knees.  It is mainly over the medial joint line.  It is worse with activity including going down the stairs.  It is better with rest.  She also has been taking diclofenac pills, which are very helpful.  She had an injection prior to her previous surgery which was helpful, but has not had one since.    Pertinent negatives:  Patient has no history of DVT or PE. Discussed risk factors.    MEDICATIONS:     Current Outpatient Medications:      acetaminophen (TYLENOL) 500 MG tablet, Take 500-1,000 mg by mouth every 6 hours as needed for mild pain, Disp: , Rfl:      Calcium-Vitamin D (CALCIUM + D PO), Take 1 tablet by mouth daily , Disp: , Rfl:      escitalopram (LEXAPRO) 10 MG tablet, Take 1 tablet (10 mg) by mouth daily, Disp: 90 tablet, Rfl: 3     Glucosamine-Chondroit-Vit C-Mn (GLUCOSAMINE 1500 COMPLEX PO), Take by mouth daily , Disp: , Rfl:      IBUPROFEN PO, Take 200 mg by mouth as needed for moderate pain , Disp: , Rfl:      Multiple Vitamin (MV-ONE PO), Take 1 tablet by mouth daily , Disp: , Rfl:      Multiple Vitamins-Minerals (PRESERVISION AREDS) CAPS, Take one daily, Disp: 90 capsule, Rfl: 3     norethindrone-ethinyl estradiol (FEMHRT 1/5) 1-5  MG-MCG tablet, Take 1 tablet by mouth daily Needs clinic visit and pap for further refills, Disp: 84 tablet, Rfl: 3     sulfacetamide sodium, Acne, (KLARON) 10 % lotion, Apply once at night, Disp: 118 mL, Rfl: 3      MEDICAL HISTORY:   Past Medical History:   Diagnosis Date     Acne rosacea      ASCUS on Pap smear 08/06     Reactive airway disease        ALLERGIES: Patient has no known allergies.    SURGICAL HISTORY:   Past Surgical History:   Procedure Laterality Date     ARTHROSCOPY KNEE BILATERAL Bilateral 9/4/2015    Procedure: ARTHROSCOPY KNEE BILATERAL;  Surgeon: Tracie Stewart MD;  [facility]     EXCISE MASS UPPER EXTREMITY  10/09    right forearm lipoma     INJECT EPIDURAL CERVICAL / THORACIC SINGLE N/A 3/7/2018    Procedure: INJECT EPIDURAL CERVICAL / THORACIC SINGLE;  Cervical Epidural Steroid Injectioncervical 7, throasic 1;  Surgeon: Levi Conteh MD;  [facility]: [facility]     KNEE SURGERY  09/2015    Bilateral meniscectomies       FAMILY HISTORY:   Family History   Problem Relation Age of Onset     Gastrointestinal Disease Mother      Hearing Loss Mother      Hypertension Mother      Heart Failure Mother      Osteopenia Mother      Atrial fibrillation Mother         pacemaker     Hearing Loss Father      Arthritis Father      Prostate Cancer Father      Eye Disorder Father         macular degeneration     Osteopenia Father         after tx for prostate cancer     Osteoarthritis Father 72        knee replacement     Depression Paternal Grandmother      Osteoarthritis Brother 62        knee replacement       SOCIAL HISTORY:   Social History     Tobacco Use     Smoking status: Never Smoker     Smokeless tobacco: Never Used   Substance Use Topics     Alcohol use: Yes     Comment: 1-2 glasses wine per week           PHYSICAL EXAMINATION:   On physical examination the patient appears the stated age, is in no acute distress, affect is appropriate, and breathing is non-labored.  There were no vitals  taken for this visit.  Data Unavailable  There is no height or weight on file to calculate BMI.    LLE:  Skin intact.  Mild varus deformity through the knee.  Tenderness to palpation over the medial joint line.  Nontender over the lateral joint line, and the patellofemoral joint.  Prior surgical incision: Well-healed without erythema, dehiscence, or drainage.  Overall limb alignment is: Mild varus  Effusion or swelling of the knee: None  Tenderness to palpation: Medial joint line  ROM: 0 to 140  Pain with knee ROM: Mild  Crepitance with knee ROM: None  Extensor lag: None  MCL stability: Stable  Lateral Stability: Stable  Lachman: 1A  Posterior stability: stable  Pain with passive full hip range of motion: none       Motor intact distally TA/GSC/EHL/FHL with 5/5 strength. SILT sp/dp/tibial/saph/sural nerves. DP/PT pulses palpable, 2+, toes warm and well perfused.       RLE:     Skin intact.  Mild varus deformity through the knee.  Tenderness to palpation over the medial joint line.  Nontender over the lateral joint line, and the patellofemoral joint.  Prior surgical incision: Well-healed without erythema, dehiscence, or drainage.  Overall limb alignment is: Mild varus  Effusion or swelling of the knee: None  Tenderness to palpation: Medial joint line  ROM: 0 to 140  Pain with knee ROM: Mild  Crepitance with knee ROM: None  Extensor lag: None  MCL stability: Stable  Lateral Stability: Stable  Lachman: 1A  Posterior stability: stable  Pain with passive full hip range of motion: none       Motor intact distally TA/GSC/EHL/FHL with 5/5 strength. SILT sp/dp/tibial/saph/sural nerves. DP/PT pulses palpable, 2+, toes warm and well perfused.       IMAGING: Standing long leg alignment films as well as lateral and axial views of the bilateral knees reveal medial joint space narrowing.  Right side is 11 degrees of varus, and left 8 degrees of varus.            Answers for HPI/ROS submitted by the patient on 5/20/2019   General  Symptoms: No  Skin Symptoms: No  HENT Symptoms: No  EYE SYMPTOMS: No  HEART SYMPTOMS: No  LUNG SYMPTOMS: No  INTESTINAL SYMPTOMS: No  URINARY SYMPTOMS: No  GYNECOLOGIC SYMPTOMS: No  BREAST SYMPTOMS: No  SKELETAL SYMPTOMS: Yes  BLOOD SYMPTOMS: No  NERVOUS SYSTEM SYMPTOMS: No  MENTAL HEALTH SYMPTOMS: No  Back pain: No  Muscle aches: Yes  Neck pain: No  Swollen joints: No  Joint pain: Yes  Bone pain: No  Muscle cramps: No  Muscle weakness: No  Joint stiffness: Yes  Bone fracture: No

## 2019-05-21 NOTE — LETTER
5/21/2019       RE: Barby Jensen  [address] [address]     Dear Colleague,    Thank you for referring your patient, Barby Jensen, to the [facility]. Please see a copy of my visit note below.    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  Medial compartment knee pain began when the patient began exercising again earlier this year.    -We discussed the options with the patient.  They would include surgery versus nonoperative management.  Nonoperative management would include symptomatic management with over-the-counter anti-inflammatories, bracing as needed, activity modification, and corticosteroid injection.  The patient would like to proceed with bilateral knee steroid injections.  -We discussed with the patient that she may be a candidate for medial unicompartmental knee arthroplasties.  She does have 11 degrees of varus on the right, which may be a contraindication.  She likely would be a good candidate on the left.  -The patient will use a right sided medial  brace.  We also ordered oral diclofenac for her.  -Patient will follow-up with Dr. Martin as needed going forward.  We did discuss that if she would like to discuss unicompartmental knee arthroplasties, then Dr. Lau or Dr. Senior would be good options for follow-up.  Their contact information was provided.      The patient was seen and  discussed with staff surgeon Dr. Stewart who was in agreement with the above assessment and plan.     Room time: 25 min, Consultation time: 20 min.     Keny Frausto, PGY-4  Orthopaedic Surgery    I have personally examined this patient and have reviewed the clinical presentation and progress note with the resident.  I agree with the treatment plan as outlined.  The plan was formulated with the resident on the day of the resident dictation.    Tracie Stewart     HISTORY OF PRESENT ILLNESS:  Barby Jensen is a 58 year old female who has long-standing history of atraumatic bilateral knee pain.  In 2015 she underwent bilateral knee arthroscopies which showed degenerative change in the medial compartments of her knees bilaterally.  She did very well after the surgery, and her knee pain improved significantly.  She reports that she gained a small amount of weight over the winter, and when she attempted to begin exercising again in January/February, she began to notice bilateral knee pain again.  She reports the pain is even between the 2 knees.  It is mainly over the medial joint line.  It is worse with activity including going down the stairs.  It is better with rest.  She also has been taking diclofenac pills, which are very helpful.  She had an injection prior to her previous surgery which was helpful, but has not had one since.    Pertinent negatives:  Patient has no history of DVT or PE. Discussed risk factors.    MEDICATIONS:     Current Outpatient Medications:      acetaminophen (TYLENOL) 500 MG tablet, Take 500-1,000 mg by mouth every 6 hours as needed for mild pain, Disp: , Rfl:      Calcium-Vitamin D (CALCIUM + D PO), Take 1 tablet by mouth daily , Disp: , Rfl:      escitalopram (LEXAPRO) 10 MG tablet, Take 1 tablet (10 mg) by mouth daily, Disp: 90 tablet, Rfl: 3     Glucosamine-Chondroit-Vit C-Mn (GLUCOSAMINE 1500 COMPLEX PO), Take by mouth daily , Disp: , Rfl:      IBUPROFEN PO, Take 200 mg by mouth as  needed for moderate pain , Disp: , Rfl:      Multiple Vitamin (MV-ONE PO), Take 1 tablet by mouth daily , Disp: , Rfl:      Multiple Vitamins-Minerals (PRESERVISION AREDS) CAPS, Take one daily, Disp: 90 capsule, Rfl: 3     norethindrone-ethinyl estradiol (FEMHRT 1/5) 1-5 MG-MCG tablet, Take 1 tablet by mouth daily Needs clinic visit and pap for further refills, Disp: 84 tablet, Rfl: 3     sulfacetamide sodium, Acne, (KLARON) 10 % lotion, Apply once at night, Disp: 118 mL, Rfl: 3    MEDICAL HISTORY:   Past Medical History:   Diagnosis Date     Acne rosacea      ASCUS on Pap smear 08/06     Reactive airway disease        ALLERGIES: Patient has no known allergies.    SURGICAL HISTORY:   Past Surgical History:   Procedure Laterality Date     ARTHROSCOPY KNEE BILATERAL Bilateral 9/4/2015    Procedure: ARTHROSCOPY KNEE BILATERAL;  Surgeon: Tracie Stewart MD;  [facility]     EXCISE MASS UPPER EXTREMITY  10/09    right forearm lipoma     INJECT EPIDURAL CERVICAL / THORACIC SINGLE N/A 3/7/2018    Procedure: INJECT EPIDURAL CERVICAL / THORACIC SINGLE;  Cervical Epidural Steroid Injectioncervical 7, throasic 1;  Surgeon: Levi Conteh MD;  [facility]     KNEE SURGERY  09/2015    Bilateral meniscectomies       FAMILY HISTORY:   Family History   Problem Relation Age of Onset     Gastrointestinal Disease Mother      Hearing Loss Mother      Hypertension Mother      Heart Failure Mother      Osteopenia Mother      Atrial fibrillation Mother         pacemaker     Hearing Loss Father      Arthritis Father      Prostate Cancer Father      Eye Disorder Father         macular degeneration     Osteopenia Father         after tx for prostate cancer     Osteoarthritis Father 72        knee replacement     Depression Paternal Grandmother      Osteoarthritis Brother 62        knee replacement       SOCIAL HISTORY:   Social History     Tobacco Use     Smoking status: Never Smoker     Smokeless tobacco: Never Used    Substance Use Topics     Alcohol use: Yes     Comment: 1-2 glasses wine per week       PHYSICAL EXAMINATION:   On physical examination the patient appears the stated age, is in no acute distress, affect is appropriate, and breathing is non-labored.  There were no vitals taken for this visit.  Data Unavailable  There is no height or weight on file to calculate BMI.    LLE:  Skin intact.  Mild varus deformity through the knee.  Tenderness to palpation over the medial joint line.  Nontender over the lateral joint line, and the patellofemoral joint.  Prior surgical incision: Well-healed without erythema, dehiscence, or drainage.  Overall limb alignment is: Mild varus  Effusion or swelling of the knee: None  Tenderness to palpation: Medial joint line  ROM: 0 to  140  Pain with knee ROM: Mild  Crepitance with knee ROM: None  Extensor lag: None  MCL stability: Stable  Lateral Stability: Stable  Lachman: 1A  Posterior stability: stable  Pain with passive full hip range of motion: none     Motor intact distally TA/GSC/EHL/FHL with 5/5 strength. SILT sp/dp/tibial/saph/sural nerves. DP/PT pulses palpable, 2+, toes warm and well perfused.     RLE:     Skin intact.  Mild varus deformity through the knee.  Tenderness to palpation over the medial joint line.  Nontender over the lateral joint line, and the patellofemoral joint.  Prior surgical incision: Well-healed without erythema, dehiscence, or drainage.  Overall limb alignment is: Mild varus  Effusion or swelling of the knee: None  Tenderness to palpation: Medial joint line  ROM: 0 to  140  Pain with knee ROM: Mild  Crepitance with knee ROM: None  Extensor lag: None  MCL stability: Stable  Lateral Stability: Stable  Lachman: 1A  Posterior stability: stable  Pain with passive full hip range of motion: none     Motor intact distally TA/GSC/EHL/FHL with 5/5 strength. SILT sp/dp/tibial/saph/sural nerves. DP/PT pulses palpable, 2+, toes warm and well perfused.     IMAGING:  Standing long leg alignment films as well as lateral and axial views of the bilateral knees reveal medial joint space narrowing.  Right side is 11 degrees of varus, and left 8 degrees of varus.    Large Joint Injection/Arthocentesis: bilateral knee  Date/Time: 2019 9:48 AM  Performed by: Tracie Stewart MD  Authorized by: Tracie Stewart MD     Indications:  Pain and osteoarthritis  Needle Size:  21 G  Guidance: landmark guided    Approach:  Medial  Location:  Knee  Laterality:  Bilateral    Medications (Right):  9 mL lidocaine (PF) 1 %; 40 mg triamcinolone 40 MG/ML  Medications (Left):  9 mL lidocaine (PF) 1 %; 40 mg triamcinolone 40 MG/ML  Outcome:  Tolerated well, no immediate complications  Procedure discussed: discussed risks, benefits, and alternatives    Consent Given by:  Patient  Timeout: timeout called immediately prior to procedure    Prep: patient was prepped and draped in usual sterile fashion     HISTORY OF PRESENT ILLNESS:  Barby Jensen is a 58 year old female with knee pain.  Diagnosis is knee arthritis supported by history, physical exam, and imaging.    PROCEDURE:  After informed verbal and writtten consent, under sterile conditions, patient's bilateral knees were injected with 9 cc of Lidocaine and 1 cc of Kenalog (40 mg/ml).   The injection was done by Dr. Stewart.    Patient had good pain relief upon leaving the clinic, and will follow up with us on an as needed basis.      [facility]  [address] [place] [id number] [phone]  Dept: [phone]  ______________________________________________________________________________    Patient: Barby Jensen   : 1960   MRN: [id number]   May 21, 2019    INVASIVE PROCEDURE SAFETY CHECKLIST    Date: 2019   Procedure: Bilateral knee steroid injections  Patient Name: Barby Jensen  MRN: [id number]  YOB: 1960    Action: Complete sections as appropriate. Any  discrepancy results in a HARD COPY until resolved.     PRE PROCEDURE:  Patient ID verified with 2 identifiers (name and  or MRN): Yes  Procedure and site verified with patient/designee (when able): Yes  Accurate consent documentation in medical record: Yes  H&P (or appropriate assessment) documented in medical record: Yes  H&P must be up to 20 days prior to procedure and updates within 24 hours of procedure as applicable: Yes  Relevant diagnostic and radiology test results appropriately labeled and displayed as applicable: Yes  Procedure site(s) marked with provider initials: Yes    TIMEOUT:  Time-Out performed immediately prior to starting procedure, including verbal and active participation of all team members addressing the following:Yes  * Correct patient identify  * Confirmed that the correct side and site are marked  * An accurate procedure consent form  * Agreement on the procedure to be done  * Correct patient position  * Relevant images and results are properly labeled and appropriately displayed  * The need to administer antibiotics or fluids for irrigation purposes during the procedure as applicable   * Safety precautions based on patient history or medication use    DURING PROCEDURE: Verification of correct person, site, and procedures any time the responsibility for care of the patient is transferred to another member of the care team.     The following medications were given:     Prior to injection, verified patient identity using patient's name and date of birth.  Due to injection administration, patient instructed to remain in clinic for 15 minutes  afterwards, and to report any adverse reaction to me immediately.    Joint injection was performed.    Medication Name: Lidocaine NDC: 00652-310-88  Drug Amount Wasted:  Yes: 2 mg/ml   Vial/Syringe: Single dose vial  Expiration Date:  22    Kenalog 40 ND: [id number]    JOSSUE Hill MD  Professor Orthopedic  Surgery  [facility]

## 2019-05-21 NOTE — NURSING NOTE
Reason For Visit:   Chief Complaint   Patient presents with     RECHECK     DOS 9/4/15 Bilateral knee arthroscopy, bilateral medial meniscectomy, here for bilateral knee pain       Primary MD: Eryn Caro    ?  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  Work status?  Full time.  Date of injury: NA  Type of injury: NA.  Date of surgery: 9/4/15  Type of surgery: Bilateral knee arthroscopy, bilateral medial meniscectomy.  Smoker: No  Request smoking cessation information: No    There were no vitals taken for this visit.    Pain Assessment  Patient Currently in Pain: Yes  0-10 Pain Scale: 3(Both knees about same level of pain)  Primary Pain Location: Knee(Bilateral)  Pain Descriptors: Aching  Alleviating Factors: Rest  Aggravating Factors: Stairs, Exercise             Diann Wilde ATC

## 2019-05-21 NOTE — PROGRESS NOTES
Large Joint Injection/Arthocentesis: bilateral knee  Date/Time: 2019 9:48 AM  Performed by: Tracie Stewart MD  Authorized by: Tracie Stewart MD     Indications:  Pain and osteoarthritis  Needle Size:  21 G  Guidance: landmark guided    Approach:  Medial  Location:  Knee  Laterality:  Bilateral      Medications (Right):  9 mL lidocaine (PF) 1 %; 40 mg triamcinolone 40 MG/ML  Medications (Left):  9 mL lidocaine (PF) 1 %; 40 mg triamcinolone 40 MG/ML  Outcome:  Tolerated well, no immediate complications  Procedure discussed: discussed risks, benefits, and alternatives    Consent Given by:  Patient  Timeout: timeout called immediately prior to procedure    Prep: patient was prepped and draped in usual sterile fashion     HISTORY OF PRESENT ILLNESS:  Barby Jensen is a 58 year old female with knee pain.  Diagnosis is knee arthritis supported by history, physical exam, and imaging.    PROCEDURE:  After informed verbal and writtten consent, under sterile conditions, patient's bilateral knees were injected with 9 cc of Lidocaine and 1 cc of Kenalog (40 mg/ml).   The injection was done by Dr. Stewart.    Patient had good pain relief upon leaving the clinic, and will follow up with us on an as needed basis.        [facility]  [address] [place], [place] [id number] [phone]  Dept: [phone]  ______________________________________________________________________________    Patient: Barby Jensen   : 1960   MRN: [id number]   May 21, 2019    INVASIVE PROCEDURE SAFETY CHECKLIST    Date: 2019   Procedure: Bilateral knee steroid injections  Patient Name: Barby Jensen  MRN: [id number]  YOB: 1960    Action: Complete sections as appropriate. Any discrepancy results in a HARD COPY until resolved.     PRE PROCEDURE:  Patient ID verified with 2 identifiers (name and  or MRN): Yes  Procedure and site verified with patient/designee (when able):  Yes  Accurate consent documentation in medical record: Yes  H&P (or appropriate assessment) documented in medical record: Yes  H&P must be up to 20 days prior to procedure and updates within 24 hours of procedure as applicable: Yes  Relevant diagnostic and radiology test results appropriately labeled and displayed as applicable: Yes  Procedure site(s) marked with provider initials: Yes    TIMEOUT:  Time-Out performed immediately prior to starting procedure, including verbal and active participation of all team members addressing the following:Yes  * Correct patient identify  * Confirmed that the correct side and site are marked  * An accurate procedure consent form  * Agreement on the procedure to be done  * Correct patient position  * Relevant images and results are properly labeled and appropriately displayed  * The need to administer antibiotics or fluids for irrigation purposes during the procedure as applicable   * Safety precautions based on patient history or medication use    DURING PROCEDURE: Verification of correct person, site, and procedures any time the responsibility for care of the patient is transferred to another member of the care team.       The following medications were given:         Prior to injection, verified patient identity using patient's name and date of birth.  Due to injection administration, patient instructed to remain in clinic for 15 minutes  afterwards, and to report any adverse reaction to me immediately.    Joint injection was performed.    Medication Name: Lidocaine NDC: 07830-781-05  Drug Amount Wasted:  Yes: 2 mg/ml   Vial/Syringe: Single dose vial  Expiration Date:  12/1/22    Kenalog 40 ND: [id number]    JOSSUE Hill MD  Professor Orthopedic Surgery  [facility]

## 2019-05-22 ASSESSMENT — ANXIETY QUESTIONNAIRES: GAD7 TOTAL SCORE: 0

## 2019-08-22 DIAGNOSIS — L71.9 ROSACEA: ICD-10-CM

## 2019-08-22 RX ORDER — METRONIDAZOLE 10 MG/G
GEL TOPICAL DAILY
Qty: 60 G | Refills: 3 | Status: SHIPPED | OUTPATIENT
Start: 2019-08-22 | End: 2020-10-01

## 2019-08-22 NOTE — TELEPHONE ENCOUNTER
Last seen for physical with Pingel on 5/8/19,  No future appts     Routing refill request to provider for review/approval because:  Drug not active on patient's medication list.  Is on med hx list.     You have prescribed in the past, last on 11/8/17 with refills for a year.    Are you willing to refill?  Pt uses for rosacea.    Faith Gray, RN,RN  August 22, 2019 10:26 AM

## 2019-11-03 ENCOUNTER — HEALTH MAINTENANCE LETTER (OUTPATIENT)
Age: 59
End: 2019-11-03

## 2019-11-19 ENCOUNTER — TRANSFERRED RECORDS (OUTPATIENT)
Dept: HEALTH INFORMATION MANAGEMENT | Facility: CLINIC | Age: 59
End: 2019-11-19

## 2020-01-24 ENCOUNTER — TELEPHONE (OUTPATIENT)
Dept: FAMILY MEDICINE | Facility: CLINIC | Age: 60
End: 2020-01-24

## 2020-01-24 DIAGNOSIS — G89.29 CHRONIC PAIN OF BOTH KNEES: Primary | ICD-10-CM

## 2020-01-24 DIAGNOSIS — M25.562 CHRONIC PAIN OF BOTH KNEES: Primary | ICD-10-CM

## 2020-01-24 DIAGNOSIS — M25.561 CHRONIC PAIN OF BOTH KNEES: Primary | ICD-10-CM

## 2020-01-24 NOTE — TELEPHONE ENCOUNTER
[facility] Call Center    Phone Message    May a detailed message be left on voicemail: yes    Reason for Call: Other: Pt is requesting a referral to the [facility] pain clinic at the [facility] to see Dr. Levin. She states she is wanting to be seen to manage her knee pain. Please advise.    Action Taken: Message routed to:  [facility]: [facility] nurse

## 2020-01-24 NOTE — TELEPHONE ENCOUNTER
"Patient has h/o arthritis knee pain, was seen by Dr. Stewart last May and had injection with good effect. She works at [employer] and is asking for referral there for \"Simvisc\" injections into knees.     Dr. Vania THAKUR for referral to [facility], Dr. Levin?    Janie Antony RN    "

## 2020-04-05 DIAGNOSIS — M25.561 CHRONIC PAIN OF BOTH KNEES: Primary | ICD-10-CM

## 2020-04-05 DIAGNOSIS — G89.29 CHRONIC PAIN OF BOTH KNEES: Primary | ICD-10-CM

## 2020-04-05 DIAGNOSIS — M25.562 CHRONIC PAIN OF BOTH KNEES: Primary | ICD-10-CM

## 2020-04-15 ASSESSMENT — ENCOUNTER SYMPTOMS
BACK PAIN: 0
ARTHRALGIAS: 1
JOINT SWELLING: 0
MYALGIAS: 1
STIFFNESS: 1
MUSCLE CRAMPS: 0
NECK PAIN: 0
MUSCLE WEAKNESS: 0

## 2020-04-21 ENCOUNTER — VIRTUAL VISIT (OUTPATIENT)
Dept: ORTHOPEDICS | Facility: CLINIC | Age: 60
End: 2020-04-21
Payer: COMMERCIAL

## 2020-04-21 ENCOUNTER — TELEPHONE (OUTPATIENT)
Dept: ORTHOPEDICS | Facility: CLINIC | Age: 60
End: 2020-04-21

## 2020-04-21 DIAGNOSIS — M17.11 ARTHRITIS OF RIGHT KNEE: ICD-10-CM

## 2020-04-21 DIAGNOSIS — M17.12 ARTHRITIS OF LEFT KNEE: Primary | ICD-10-CM

## 2020-04-21 NOTE — PROGRESS NOTES
"  RECHECK       follow up, bilateral knee, knee pain, DOS 09/04/2015, last injection 5/21/2019 bilateral        Primary MD: Eryn Caro  Ref. MD: est     ?  No  Occupation RN  Currently working? [employer]     Date of injury: NA  Type of injury: NA     Date of surgery: 9/4/2015  Type of surgery: Bilateral Knee arthoscopy, bilateral medial meniscectomy.     Smoker: no  Request smoking cessation information: no     There were no vitals taken for this visit.     Pain Assessment  Patient Currently in Pain: Yes  0-10 Pain Scale: 6  Primary Pain Location: Knee(left knee worse)  Pain Descriptors: Aching, Sharp  Alleviating Factors: Other (comment), NSAIDS(wearing brace)  Aggravating Factors: Walking, Stairs       eSna MIGUEL ÁNGEL Lloyd      Barby Jensen is a 59 year old female who is being evaluated via a billable video visit.      The patient has been notified of following:     \"This video visit will be conducted via a call between you and your physician/provider. We have found that certain health care needs can be provided without the need for an in-person physical exam.  This service lets us provide the care you need with a video conversation.  If a prescription is necessary we can send it directly to your pharmacy.  If lab work is needed we can place an order for that and you can then stop by our lab to have the test done at a later time.    Video visits are billed at different rates depending on your insurance coverage.  Please reach out to your insurance provider with any questions.    If during the course of the call the physician/provider feels a video visit is not appropriate, you will not be charged for this service.\"    Patient has given verbal consent for Video visit? Yes    How would you like to obtain your AVS? Sandra    Patient would like the video invitation sent by: Send to e-mail at: [email or web address]    Will anyone else be joining your video visit? No      Video-Visit " Details    Type of service:  Video Visit    Attempted x2.: no connection possible. Aborted to telephone.    Subjective:  60 y/o female with bilateral genu varum Right > left by imaging,  Left > right by symptoms.    Last Xray  5/21/19 (10   varus right, 7   varus lefft)  Last bilateral knee injection may 2019, lasted until January.  Intended to get another injection but did not get in before COVID restrictions.    Continues to take VOltaren daily but now just once /day since she added back glucosamine.  Asked about Synvisc.  I advised renukaamira to stay with cortisone due the fact that she has a great response.    Plan:  Continue non -op management             Return to clinic for an injection in [facility].    Room time : 15 min'    Tracie Stewart MD  Professor Orthopedic Surgery  [facility]          Tracie Stewart MD  Professor Orthopedic Surgery  [facility]

## 2020-04-21 NOTE — TELEPHONE ENCOUNTER
[facility] Call Center    Phone Message    May a detailed message be left on voicemail: yes     Reason for Call: Other: Pt would like a call back as she missed flako's call     Action Taken: Message routed to:  [facility]    Travel Screening: Not Applicable

## 2020-04-21 NOTE — TELEPHONE ENCOUNTER
Patient was called back and offered and accepted an appointment on 5/5/20 for bilateral knee injections.

## 2020-04-21 NOTE — NURSING NOTE
Reason For Visit:   Chief Complaint   Patient presents with     RECHECK     follow up, bilateral knee, knee pain, DOS 09/04/2015        Primary MD: Eryn Caro  Ref. MD: est    ?  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    Smoker: no  Request smoking cessation information: no    There were no vitals taken for this visit.    Pain Assessment  Patient Currently in Pain: Yes  0-10 Pain Scale: 6  Primary Pain Location: Knee(left knee worse)  Pain Descriptors: Aching, Sharp  Alleviating Factors: Other (comment), NSAIDS(wearing brace)  Aggravating Factors: Walking, Stairs                   Sena Lloyd LPN

## 2020-04-21 NOTE — TELEPHONE ENCOUNTER
Patient was called and a message was left to call back or to Mychart the clinic for a follow up with Dr. Stewart.

## 2020-05-05 ENCOUNTER — OFFICE VISIT (OUTPATIENT)
Dept: ORTHOPEDICS | Facility: CLINIC | Age: 60
End: 2020-05-05
Payer: COMMERCIAL

## 2020-05-05 VITALS — BODY MASS INDEX: 23.68 KG/M2 | HEIGHT: 69 IN | WEIGHT: 159.9 LBS

## 2020-05-05 DIAGNOSIS — M17.0 PRIMARY OSTEOARTHRITIS OF BOTH KNEES: Primary | ICD-10-CM

## 2020-05-05 RX ORDER — TRIAMCINOLONE ACETONIDE 40 MG/ML
40 INJECTION, SUSPENSION INTRA-ARTICULAR; INTRAMUSCULAR
Status: DISCONTINUED | OUTPATIENT
Start: 2020-05-05 | End: 2020-10-16

## 2020-05-05 RX ORDER — LIDOCAINE HYDROCHLORIDE 10 MG/ML
9 INJECTION, SOLUTION EPIDURAL; INFILTRATION; INTRACAUDAL; PERINEURAL
Status: DISCONTINUED | OUTPATIENT
Start: 2020-05-05 | End: 2020-10-16

## 2020-05-05 RX ADMIN — TRIAMCINOLONE ACETONIDE 40 MG: 40 INJECTION, SUSPENSION INTRA-ARTICULAR; INTRAMUSCULAR at 10:19

## 2020-05-05 RX ADMIN — LIDOCAINE HYDROCHLORIDE 9 ML: 10 INJECTION, SOLUTION EPIDURAL; INFILTRATION; INTRACAUDAL; PERINEURAL at 10:19

## 2020-05-05 ASSESSMENT — MIFFLIN-ST. JEOR: SCORE: 1358.06

## 2020-05-05 NOTE — PROGRESS NOTES
Large Joint Injection/Arthocentesis: bilateral knee    Date/Time: 2020 10:19 AM  Performed by: Tracie Stewart MD  Authorized by: Tracie Stewart MD     Indications:  Pain and osteoarthritis  Needle Size:  21 G  Guidance: landmark guided    Approach:  Medial  Location:  Knee  Laterality:  Bilateral      Medications (Right):  40 mg triamcinolone 40 MG/ML; 9 mL lidocaine (PF) 1 %  Medications (Left):  40 mg triamcinolone 40 MG/ML; 9 mL lidocaine (PF) 1 %  Outcome:  Tolerated well, no immediate complications  Procedure discussed: discussed risks, benefits, and alternatives    Consent Given by:  Patient  Timeout: timeout called immediately prior to procedure    Prep: patient was prepped and draped in usual sterile fashion     HISTORY OF PRESENT ILLNESS:  Barby Jensen is a 59 year old female with knee pain.  Diagnosis is knee arthritis supported by history, physical exam, and imaging.    PROCEDURE:  After informed verbal and writtten consent, under sterile conditions, patient's bilateral knees were injected with 9 cc of Lidocaine and 1 cc of Kenalog (40 mg/ml).   The injection was done by Dr. Stewart.    Patient had good pain relief upon leaving the clinic, and will follow up with us on an as needed basis.          [facility]  [address] [place], [place] [id number] [phone]  Dept: [phone]  ______________________________________________________________________________    Patient: Barby Jensen   : 1960   MRN: [id number]   May 5, 2020    INVASIVE PROCEDURE SAFETY CHECKLIST    Date: 2020   Procedure:Bilateral knees steroid injection  Patient Name: Barby Jensen  MRN: [id number]  YOB: 1960    Action: Complete sections as appropriate. Any discrepancy results in a HARD COPY until resolved.     PRE PROCEDURE:  Patient ID verified with 2 identifiers (name and  or MRN): Yes  Procedure and site verified with patient/designee (when  able): Yes  Accurate consent documentation in medical record: Yes  H&P (or appropriate assessment) documented in medical record: NA  H&P must be up to 20 days prior to procedure and updates within 24 hours of procedure as applicable: NA  Relevant diagnostic and radiology test results appropriately labeled and displayed as applicable: Yes  Procedure site(s) marked with provider initials: NA    TIMEOUT:  Time-Out performed immediately prior to starting procedure, including verbal and active participation of all team members addressing the following:Yes  * Correct patient identify  * Confirmed that the correct side and site are marked  * An accurate procedure consent form  * Agreement on the procedure to be done  * Correct patient position  * Relevant images and results are properly labeled and appropriately displayed  * The need to administer antibiotics or fluids for irrigation purposes during the procedure as applicable   * Safety precautions based on patient history or medication use    DURING PROCEDURE: Verification of correct person, site, and procedures any time the responsibility for care of the patient is transferred to another member of the care team.       The following medications were given:         Prior to injection, verified patient identity using patient's name and date of birth.  Due to injection administration, patient instructed to remain in clinic for 15 minutes  afterwards, and to report any adverse reaction to me immediately.    Joint injection was performed.    Medication Name: Lidocaine NDC 88969-715-43 for both knees  Drug Amount Wasted:  Yes: 2 mg/ml   Vial/Syringe: Multi dose vial  Expiration Date:  06/23    Medication Name Kenolog NDC [address]-4 for both knees    Scribed by Allison Contreras ATC for Dr. Stewart on May 5, 2020 at 10:20am based on the provider's statements to me.     JOSSUE Jones MD  Professor Orthopedic Surgery  [facility]

## 2020-05-05 NOTE — NURSING NOTE
"Reason For Visit:   Chief Complaint   Patient presents with     RECHECK     Bilateral injections.  last injection  5/21/19 follow up, bilateral knee, knee pain, DOS 09/04/2015        Primary MD: Eryn Caro  Ref. MD: est     ?  No  Occupation RN  Currently working? [employer]     Date of injury: NA  Type of injury: NA     Date of surgery: 9/4/2015  Type of surgery: Bilateral Knee arthoscopy, bilateral medial meniscectomy.     Smoker: no  Request smoking cessation information: no     There were no vitals taken for this visit.     Pain Assessment  Patient Currently in Pain: Yes  0-10 Pain Scale: 6  Primary Pain Location: Knee(left knee worse)  Pain Descriptors: Aching, Sharp  Alleviating Factors: Other (comment), NSAIDS(wearing brace)  Aggravating Factors: Walking, Stairs    Ht 1.75 m (5' 8.9\")   Wt 72.5 kg (159 lb 14.4 oz)   BMI 23.68 kg/m      Pain Assessment  Patient Currently in Pain: Yes  0-10 Pain Scale: 4  Primary Pain Location: Knee(bilateral)                                                   Sena Lloyd LPN  "

## 2020-05-05 NOTE — LETTER
2020       RE: Barby Jensen  [address] [address]     Dear Colleague,    Thank you for referring your patient, Barby Jensen, to the [facility] at [facility]. Please see a copy of my visit note below.    Large Joint Injection/Arthocentesis: bilateral knee    Date/Time: 2020 10:19 AM  Performed by: Tracie Stewart MD  Authorized by: Tracie Stewart MD     Indications:  Pain and osteoarthritis  Needle Size:  21 G  Guidance: landmark guided    Approach:  Medial  Location:  Knee  Laterality:  Bilateral      Medications (Right):  40 mg triamcinolone 40 MG/ML; 9 mL lidocaine (PF) 1 %  Medications (Left):  40 mg triamcinolone 40 MG/ML; 9 mL lidocaine (PF) 1 %  Outcome:  Tolerated well, no immediate complications  Procedure discussed: discussed risks, benefits, and alternatives    Consent Given by:  Patient  Timeout: timeout called immediately prior to procedure    Prep: patient was prepped and draped in usual sterile fashion     HISTORY OF PRESENT ILLNESS:  Barby Jensen is a 59 year old female with knee pain.  Diagnosis is knee arthritis supported by history, physical exam, and imaging.    PROCEDURE:  After informed verbal and writtten consent, under sterile conditions, patient's bilateral knees were injected with 9 cc of Lidocaine and 1 cc of Kenalog (40 mg/ml).   The injection was done by Dr. Stewart.    Patient had good pain relief upon leaving the clinic, and will follow up with us on an as needed basis.          [facility]  [address] [place] [id number] [phone]  Dept: [phone]  ______________________________________________________________________________    Patient: Barby Jensen   : 1960   MRN: [id number]   May 5, 2020    INVASIVE PROCEDURE SAFETY CHECKLIST    Date: 2020   Procedure:Bilateral knees steroid injection  Patient Name: Barby  Camila  MRN: [id number]  YOB: 1960    Action: Complete sections as appropriate. Any discrepancy results in a HARD COPY until resolved.     PRE PROCEDURE:  Patient ID verified with 2 identifiers (name and  or MRN): Yes  Procedure and site verified with patient/designee (when able): Yes  Accurate consent documentation in medical record: Yes  H&P (or appropriate assessment) documented in medical record: NA  H&P must be up to 20 days prior to procedure and updates within 24 hours of procedure as applicable: NA  Relevant diagnostic and radiology test results appropriately labeled and displayed as applicable: Yes  Procedure site(s) marked with provider initials: NA    TIMEOUT:  Time-Out performed immediately prior to starting procedure, including verbal and active participation of all team members addressing the following:Yes  * Correct patient identify  * Confirmed that the correct side and site are marked  * An accurate procedure consent form  * Agreement on the procedure to be done  * Correct patient position  * Relevant images and results are properly labeled and appropriately displayed  * The need to administer antibiotics or fluids for irrigation purposes during the procedure as applicable   * Safety precautions based on patient history or medication use    DURING PROCEDURE: Verification of correct person, site, and procedures any time the responsibility for care of the patient is transferred to another member of the care team.       The following medications were given:         Prior to injection, verified patient identity using patient's name and date of birth.  Due to injection administration, patient instructed to remain in clinic for 15 minutes  afterwards, and to report any adverse reaction to me immediately.    Joint injection was performed.    Medication Name: Lidocaine NDC 82211-635-36 for both knees  Drug Amount Wasted:  Yes: 2 mg/ml   Vial/Syringe: Multi dose vial  Expiration Date:   06/23    Medication Name Unruly NDC [address]-5 for both knees    Scribed by Allison Contreras ATC for Dr. Stewart on May 5, 2020 at 10:20am based on the provider's statements to me.     JOSSUE Jones MD  Professor Orthopedic Surgery  [facility]

## 2020-06-02 DIAGNOSIS — N95.1 MENOPAUSAL SYNDROME (HOT FLASHES): ICD-10-CM

## 2020-06-02 RX ORDER — NORETHINDRONE ACETATE AND ETHINYL ESTRADIOL 1; 5 MG/1; UG/1
1 TABLET ORAL DAILY
Qty: 84 TABLET | Refills: 0 | Status: SHIPPED | OUTPATIENT
Start: 2020-06-02 | End: 2020-06-16

## 2020-06-02 NOTE — TELEPHONE ENCOUNTER
Last time prescribed: 05/08/2019 , 84 tabs x 3 refills  Last office visit: 05/08/2019  Next appointment: No future appointments    Medication is being filled for 1 time refill only due to:  Patient needs to be seen because it has been more than one year since last visit.   Liliana Price RN  [facility]

## 2020-06-03 DIAGNOSIS — L71.9 ROSACEA: ICD-10-CM

## 2020-06-03 DIAGNOSIS — F41.1 GAD (GENERALIZED ANXIETY DISORDER): ICD-10-CM

## 2020-06-03 RX ORDER — SULFACETAMIDE SODIUM 100 MG/ML
LOTION TOPICAL
Qty: 118 ML | Refills: 0 | Status: SHIPPED | OUTPATIENT
Start: 2020-06-03

## 2020-06-03 RX ORDER — ESCITALOPRAM OXALATE 10 MG/1
10 TABLET ORAL DAILY
Qty: 90 TABLET | Refills: 0 | Status: SHIPPED | OUTPATIENT
Start: 2020-06-03 | End: 2020-06-16

## 2020-06-03 NOTE — TELEPHONE ENCOUNTER
Sulfacetamide Sodium: Last time prescribed: 11/08/2017 , 118 ml x 3 refills    Escitalopram: Last time prescribed: 05/08/2019 , 90 tabs x 3 refills      Last office visit: 05/08/2019  Next appointment: No future appointments    Medication is being filled for 1 time refill only due to:  Patient needs to be seen because it has been more than one year since last visit.   Liliana Price RN  [facility]

## 2020-06-15 NOTE — PROGRESS NOTES
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 She has longstanding history of anxiety and takes escitalopram 10mg dose daily. She has tried to wean in the past but symptoms flared, therefore she wishes to continue daily. She reports she is dealing with [place] of COVID [place]. [place] some land and is planting a vegetable garden. She drinks 1-2 alcoholic beverages per week. Sleep is good.   .    DEAN-7 SCORE 1/27/2018 5/21/2019 6/15/2020   Total Score - - -   Total Score 0 (minimal anxiety) - 0 (minimal anxiety)   Total Score 0 0 0     PHQ 1/15/2018 5/21/2019 6/15/2020   PHQ-9 Total Score 1 0 0   Q9: Thoughts of better off dead/self-harm past 2 weeks Not at all Not at all Not at all     HCM  She is due for routine mammogram and asks for referral   She had her colonoscopy in 11/2019, diverticula but no other findings, was told to return in 10 yr.     HRT refills  She is on FemHRT. Doing well, consistent with dosing. No vaginal spotting. No current hot flashes, nonsmoker. No hx of hypertension or heart disease. She is asking for medication refill.    Osteoarthritis  Yoanna has OA of her knees, she was seen last year by Dr. Stewart for bilateral cortisone injections in 5/2019. These lasted through Jan 2020. She had repeat injection 4/2020 but reports they have worn off. She expects to schedule surgery sometime in the next year.     Patient Active Problem List   Diagnosis     CARDIOVASCULAR SCREENING; LDL GOAL LESS THAN 130     DEAN (generalized anxiety disorder)     Rosacea     Chronic pain of both knees     Hot flashes      Cervical pain     Past Surgical History:   Procedure Laterality Date     ARTHROSCOPY KNEE BILATERAL Bilateral 9/4/2015    Procedure: ARTHROSCOPY KNEE BILATERAL;  Surgeon: Tracie Stewart MD;  [facility]     EXCISE MASS UPPER EXTREMITY  10/09    right forearm lipoma     INJECT EPIDURAL CERVICAL / THORACIC SINGLE N/A 3/7/2018    Procedure: INJECT EPIDURAL CERVICAL / THORACIC SINGLE;  Cervical Epidural Steroid Injectioncervical 7, throasic 1;  Surgeon: Levi Conteh MD;  [facility]     KNEE SURGERY  09/2015    Bilateral meniscectomies       Social History     Tobacco Use     Smoking status: Never Smoker     Smokeless tobacco: Never Used   Substance Use Topics     Alcohol use: Yes     Comment: 1-2 glasses wine per week     Family History   Problem Relation Age of Onset     Gastrointestinal Disease Mother      Hearing Loss Mother      Hypertension Mother      Heart Failure Mother      Osteopenia Mother      Atrial fibrillation Mother         pacemaker     Hearing Loss Father      Arthritis Father      Prostate Cancer Father      Eye Disorder Father         macular degeneration     Osteopenia Father         after tx for prostate cancer     Osteoarthritis Father 72        knee replacement     Depression Paternal Grandmother      Osteoarthritis Brother 62        knee replacement         Reviewed and updated as needed this visit by Provider         Review of Systems   Constitutional, HEENT, cardiovascular, pulmonary, gi and gu systems are negative, except as otherwise noted.      Objective    There were no vitals taken for this visit.  There is no height or weight on file to calculate BMI.  General:healthy, alert and no distress  Psych: Alert and oriented times 3; coherent speech, normal   rate and volume, able to articulate logical thoughts, able   to abstract reason, no tangential thoughts, no hallucinations   or delusions  Her affect is positive     ASSESSMENT:  (F41.1) DEAN (generalized anxiety  "disorder)  (primary encounter diagnosis)  Comment: currently doing very well on medication and wishes to continue  Plan: escitalopram (LEXAPRO) 10 MG tablet        Refilled x 1 yr    (N95.1) Menopausal syndrome (hot flashes)  Comment: doing well, no bleeding, hot flashes controlled  Plan: norethindrone-ethinyl estradiol (FEMHRT 1/5)         1-5 MG-MCG tablet        Refilled med    (Z12.31) Visit for screening mammogram  Comment: due for routine mammogram  Plan: Mammogram - routine screening        Referral is given.    Eryn Caro MD  Internal Medicine/Pediatrics      TELEPHONE VISIT DETAILS and Consent    Barby Jensen is a 60 year old female who is being evaluated via a billable telephone visit.      The patient has been notified of following:     \"This telephone visit will be conducted via a call between you and your physician/provider. We have found that certain health care needs can be provided without the need for a physical exam.  This service lets us provide the care you need with a short phone conversation.  If a prescription is necessary we can send it directly to your pharmacy.  If lab work is needed we can place an order for that and you can then stop by our lab to have the test done at a later time.    Telephone visits are billed at different rates depending on your insurance coverage. During this emergency period, for some insurers they may be billed the same as an in-person visit.  Please reach out to your insurance provider with any questions.    If during the course of the call the physician/provider feels a telephone visit is not appropriate, you will not be charged for this service.\"    Patient has given verbal consent for Telephone visit?  Yes    How would you like to obtain your AVS? Verified Identity Pass    After Visit Information:  Patient chose to view AVS via Verified Identity Pass    Appointment start time: 11:49 am  Appointment end time: 12: 02 pm  Phone call duration:  13 minutes              "

## 2020-06-16 ENCOUNTER — VIRTUAL VISIT (OUTPATIENT)
Dept: FAMILY MEDICINE | Facility: CLINIC | Age: 60
End: 2020-06-16
Payer: COMMERCIAL

## 2020-06-16 VITALS — WEIGHT: 154 LBS | HEIGHT: 69 IN | BODY MASS INDEX: 22.81 KG/M2

## 2020-06-16 DIAGNOSIS — F41.1 GAD (GENERALIZED ANXIETY DISORDER): Primary | ICD-10-CM

## 2020-06-16 DIAGNOSIS — N95.1 MENOPAUSAL SYNDROME (HOT FLASHES): ICD-10-CM

## 2020-06-16 DIAGNOSIS — Z12.31 VISIT FOR SCREENING MAMMOGRAM: ICD-10-CM

## 2020-06-16 RX ORDER — ESCITALOPRAM OXALATE 10 MG/1
10 TABLET ORAL DAILY
Qty: 90 TABLET | Refills: 3 | Status: SHIPPED | OUTPATIENT
Start: 2020-06-16

## 2020-06-16 RX ORDER — NORETHINDRONE ACETATE AND ETHINYL ESTRADIOL 1; 5 MG/1; UG/1
1 TABLET ORAL DAILY
Qty: 84 TABLET | Refills: 3 | Status: SHIPPED | OUTPATIENT
Start: 2020-06-16

## 2020-06-16 ASSESSMENT — MIFFLIN-ST. JEOR: SCORE: 1327.92

## 2020-07-02 ENCOUNTER — TELEPHONE (OUTPATIENT)
Dept: ORTHOPEDICS | Facility: CLINIC | Age: 60
End: 2020-07-02

## 2020-07-02 NOTE — TELEPHONE ENCOUNTER
RN called and left a detailed VM. RN didn't see any referral in [facility].  RN noticed patient sees Dr. Stewart way back in may. RN advised patient in the message to perhaps reach out to Dr. Stewart and see if she has reached out to Dr. Senior about this possible knee replacement. RN also informed patient that Zenia is out on furlough and will return next week.   RN provided call back number for patient.    Edilia Cortez RN      [facility] Call Center    Phone Message    May a detailed message be left on voicemail: yes     Reason for Call: Other: pt calling evelyn she was told that  would be doing her knee relacement, but the pt wants to know does she need a referral. Please call the pt back to discuss.      Action Taken: Message routed to:  [facility] ortho    Travel Screening: Not Applicable

## 2020-07-07 ENCOUNTER — ANCILLARY PROCEDURE (OUTPATIENT)
Dept: GENERAL RADIOLOGY | Facility: CLINIC | Age: 60
End: 2020-07-07
Attending: ORTHOPAEDIC SURGERY
Payer: COMMERCIAL

## 2020-07-07 DIAGNOSIS — M17.0 PRIMARY OSTEOARTHRITIS OF BOTH KNEES: Primary | ICD-10-CM

## 2020-07-07 DIAGNOSIS — M17.0 PRIMARY OSTEOARTHRITIS OF BOTH KNEES: ICD-10-CM

## 2020-07-17 ENCOUNTER — TELEPHONE (OUTPATIENT)
Dept: ORTHOPEDICS | Facility: CLINIC | Age: 60
End: 2020-07-17

## 2020-07-17 NOTE — TELEPHONE ENCOUNTER
Received voicemail from patient stating that she was informed by [facility] to call me to schedule surgery in October. I returned phone call to patient and explained that we are currently only scheduling on a rolling 6 weeks basis but I can tentatively hold a surgery date in October and once I get Octobers schedule I will call to confirm. Patient agreed and would like to hold 10/15/20. Hold placed on calendar.

## 2020-07-24 DIAGNOSIS — Z11.59 ENCOUNTER FOR SCREENING FOR OTHER VIRAL DISEASES: Primary | ICD-10-CM

## 2020-07-24 PROBLEM — M17.11 ARTHRITIS OF RIGHT KNEE: Status: ACTIVE | Noted: 2020-07-24

## 2020-07-24 NOTE — TELEPHONE ENCOUNTER
Patient is scheduled for surgery with Dr. Stewart      Spoke or left message with: Spoke with Yoanna    Date of Surgery: 10/15/20    Location: [facility]    Informed patient they will need an adult  Yes    Pre-op with surgeon (if applicable): n/a    H&P: Scheduled with PAC    Additional imaging/appointments: patient will await call from central scheduling to schedule covid test    Surgery packet:     Additional comments: Patient will receive arrival time at PAC appointment

## 2020-08-04 DIAGNOSIS — M17.0 PRIMARY OSTEOARTHRITIS OF BOTH KNEES: Primary | ICD-10-CM

## 2020-08-04 DIAGNOSIS — M17.11 ARTHRITIS OF RIGHT KNEE: ICD-10-CM

## 2020-08-04 RX ORDER — TRANEXAMIC ACID 650 MG/1
1950 TABLET ORAL ONCE
Status: CANCELLED | OUTPATIENT
Start: 2020-10-15

## 2020-08-12 ENCOUNTER — TELEPHONE (OUTPATIENT)
Dept: ORTHOPEDICS | Facility: CLINIC | Age: 60
End: 2020-08-12

## 2020-08-12 NOTE — TELEPHONE ENCOUNTER
Received voicemail from patient requesting a call back to discuss what location she is scheduled for surgery? Patient states that her preference is using the [facility] at the [address] [place] [address]. Patient states in her pre op packet it is noted that surgery will be at the [place] location.      I returned phone call to patient and informed patient that she is scheduled at the [address] [place] [address] using the [facility] program. Patient stated understanding.      Patient would like a call back from Dr. Rosenberg care team to go over how frequently she should be attending physical therapy after surgery.

## 2020-08-13 NOTE — TELEPHONE ENCOUNTER
FUTURE VISIT INFORMATION      SURGERY INFORMATION:    Date: 10/15/20    [facility]    Surgeon:  Tracie Stewart MD     Anesthesia Type:  choice    Procedure: Total Knee Arthroplasty     Consult: ov     RECORDS REQUESTED FROM:       Primary Care Provider: Eryn Caro MD- [place]    Most recent EKG+ Tracin/1/15

## 2020-08-17 NOTE — TELEPHONE ENCOUNTER
Attempted to return patient's call to answer her questions about frequency of physical therapy. Left voicemail with callback information.    Frequency of physical therapy is determined by the physical therapist after her first initial visit.

## 2020-09-02 ENCOUNTER — DOCUMENTATION ONLY (OUTPATIENT)
Dept: ORTHOPEDICS | Facility: CLINIC | Age: 60
End: 2020-09-02

## 2020-09-02 NOTE — PROGRESS NOTES
Completed Disability and FMLA Medical Certification Forms and faxed to [employer] at [phone]. Submitted original to Medical Records for scanning. Alerted patient via phone.

## 2020-09-08 ENCOUNTER — TELEPHONE (OUTPATIENT)
Dept: ORTHOPEDICS | Facility: CLINIC | Age: 60
End: 2020-09-08

## 2020-09-08 NOTE — TELEPHONE ENCOUNTER
Attempted to reach patient to discuss postop physical therapy. Patient should begin outpatient physical therapy the week after surgery. Orders will be provided on discharge.

## 2020-10-01 ENCOUNTER — PRE VISIT (OUTPATIENT)
Dept: SURGERY | Facility: CLINIC | Age: 60
End: 2020-10-01

## 2020-10-01 ENCOUNTER — ANESTHESIA EVENT (OUTPATIENT)
Dept: SURGERY | Facility: CLINIC | Age: 60
End: 2020-10-01
Payer: COMMERCIAL

## 2020-10-01 ENCOUNTER — ANCILLARY PROCEDURE (OUTPATIENT)
Dept: MAMMOGRAPHY | Facility: CLINIC | Age: 60
End: 2020-10-01
Attending: INTERNAL MEDICINE
Payer: COMMERCIAL

## 2020-10-01 ENCOUNTER — OFFICE VISIT (OUTPATIENT)
Dept: SURGERY | Facility: CLINIC | Age: 60
End: 2020-10-01
Payer: COMMERCIAL

## 2020-10-01 VITALS
DIASTOLIC BLOOD PRESSURE: 69 MMHG | HEIGHT: 68 IN | SYSTOLIC BLOOD PRESSURE: 104 MMHG | RESPIRATION RATE: 12 BRPM | OXYGEN SATURATION: 99 % | TEMPERATURE: 98 F | WEIGHT: 157 LBS | HEART RATE: 78 BPM | BODY MASS INDEX: 23.79 KG/M2

## 2020-10-01 DIAGNOSIS — M17.11 PRIMARY OSTEOARTHRITIS OF RIGHT KNEE: ICD-10-CM

## 2020-10-01 DIAGNOSIS — Z01.818 PREOP EXAMINATION: Primary | ICD-10-CM

## 2020-10-01 DIAGNOSIS — Z01.818 PREOP EXAMINATION: ICD-10-CM

## 2020-10-01 DIAGNOSIS — Z12.31 VISIT FOR SCREENING MAMMOGRAM: ICD-10-CM

## 2020-10-01 LAB
ANION GAP SERPL CALCULATED.3IONS-SCNC: 7 MMOL/L (ref 3–14)
BUN SERPL-MCNC: 18 MG/DL (ref 7–30)
CALCIUM SERPL-MCNC: 8.7 MG/DL (ref 8.5–10.1)
CHLORIDE SERPL-SCNC: 109 MMOL/L (ref 94–109)
CO2 SERPL-SCNC: 22 MMOL/L (ref 20–32)
CREAT SERPL-MCNC: 0.85 MG/DL (ref 0.52–1.04)
ERYTHROCYTE [DISTWIDTH] IN BLOOD BY AUTOMATED COUNT: 12.2 % (ref 10–15)
GFR SERPL CREATININE-BSD FRML MDRD: 74 ML/MIN/{1.73_M2}
GLUCOSE SERPL-MCNC: 74 MG/DL (ref 70–99)
HCT VFR BLD AUTO: 41.5 % (ref 35–47)
HGB BLD-MCNC: 13.9 G/DL (ref 11.7–15.7)
MCH RBC QN AUTO: 31.9 PG (ref 26.5–33)
MCHC RBC AUTO-ENTMCNC: 33.5 G/DL (ref 31.5–36.5)
MCV RBC AUTO: 95 FL (ref 78–100)
PLATELET # BLD AUTO: 215 10E9/L (ref 150–450)
POTASSIUM SERPL-SCNC: 4.1 MMOL/L (ref 3.4–5.3)
RBC # BLD AUTO: 4.36 10E12/L (ref 3.8–5.2)
SODIUM SERPL-SCNC: 138 MMOL/L (ref 133–144)
WBC # BLD AUTO: 6.6 10E9/L (ref 4–11)

## 2020-10-01 PROCEDURE — 85027 COMPLETE CBC AUTOMATED: CPT | Performed by: PATHOLOGY

## 2020-10-01 PROCEDURE — 99203 OFFICE O/P NEW LOW 30 MIN: CPT | Performed by: NURSE PRACTITIONER

## 2020-10-01 PROCEDURE — 77063 BREAST TOMOSYNTHESIS BI: CPT | Mod: GC | Performed by: RADIOLOGY

## 2020-10-01 PROCEDURE — 36415 COLL VENOUS BLD VENIPUNCTURE: CPT | Performed by: PATHOLOGY

## 2020-10-01 PROCEDURE — 77067 SCR MAMMO BI INCL CAD: CPT | Mod: GC | Performed by: RADIOLOGY

## 2020-10-01 PROCEDURE — 80048 BASIC METABOLIC PNL TOTAL CA: CPT | Performed by: PATHOLOGY

## 2020-10-01 ASSESSMENT — MIFFLIN-ST. JEOR: SCORE: 1325.65

## 2020-10-01 ASSESSMENT — LIFESTYLE VARIABLES: TOBACCO_USE: 0

## 2020-10-01 ASSESSMENT — PAIN SCALES - GENERAL: PAINLEVEL: SEVERE PAIN (6)

## 2020-10-01 NOTE — ANESTHESIA PREPROCEDURE EVALUATION
"Anesthesia Pre-Procedure Evaluation    Patient: Barby Jensen   MRN:     [id number] Gender:   female   Age:    60 year old :      1960        Preoperative Diagnosis: Arthritis of right knee [M17.11]   Procedure(s):  Total Knee Arthroplasty     LABS:  CBC:   Lab Results   Component Value Date    WBC 4.8 2013    HGB 14.6 2019    HGB 13.8 2015    HCT 43.2 2019    HCT 40.7 2013     2013     BMP:   Lab Results   Component Value Date     2015     2013    POTASSIUM 4.5 2015    POTASSIUM 4.0 2013    CHLORIDE 104 2015    CHLORIDE 104 2013    CO2 30 2015    CO2 26 2013    BUN 17 2015    BUN 19 2013    CR 0.85 2015    CR 0.75 2013    GLC 83 2016    GLC 84 2015     COAGS: No results found for: PTT, INR, FIBR  POC: No results found for: BGM, HCG, HCGS  OTHER:   Lab Results   Component Value Date    GISELA 9.0 2015    ALBUMIN 4.7 2011    PROTTOTAL 7.9 2011    ALT 20 2016    AST 20 2016    ALKPHOS 81 2011    BILITOTAL 0.5 2011    TSH 0.84 2013        Preop Vitals    BP Readings from Last 3 Encounters:   10/01/20 104/69   19 110/78   18 121/81    Pulse Readings from Last 3 Encounters:   10/01/20 78   19 72   18 74      Resp Readings from Last 3 Encounters:   10/01/20 12   18 15   18 16    SpO2 Readings from Last 3 Encounters:   10/01/20 99%   19 98%   18 94%      Temp Readings from Last 1 Encounters:   10/01/20 98  F (36.7  C) (Oral)    Ht Readings from Last 1 Encounters:   10/01/20 1.727 m (5' 8\")      Wt Readings from Last 1 Encounters:   10/01/20 71.2 kg (157 lb)    Estimated body mass index is 23.87 kg/m  as calculated from the following:    Height as of this encounter: 1.727 m (5' 8\").    Weight as of this encounter: 71.2 kg (157 lb).     LDA:        Past Medical History:   Diagnosis Date " REASON FOR VISIT   New consultation for patient with pancytopenia    HISTORY OF PRESENT ILLNESS  Diagnosis: Pancytopenia  Date of diagnosis: 2018  Stage: Not applicable  Precision Medicine:  Pending  Treatment:  #1 we will recommend further workup including repeating a CBC ferritin and iron studies B12 folic acid haptoglobin and immunoelectrophoresis.    Ms. Arina Vela is a 87 year old female referred by Marv Tolbert MD who presents above-mentioned problem.    Patient recently was in hospital because of her GI bleeding. She said that she is feeling much better now she denies any active bleeding as far as she can tell she denies any melanotic stools. According to her fatigue is significant improved as compared to before. She denies any shortness of breath and cough phlegm production any chest pain. Denies any nausea vomiting or any abdominal pain.    She has chronic kidney failure and is on hemodialysis sure sessions are done on Monday Wednesday and Friday in [place].    Past Medical History:   Diagnosis Date   • Anemia 10/2014    2nd to GI Bleed   • Aortic stenosis    • Arthritis    • Balance problems     uses walker   • Chronic pain     spinal stenosis   • CKD (chronic kidney disease), stage IV (CMS/HCC)    • Diabetes mellitus (CMS/HCC)     Diet controlled   • Diverticulosis    • Extraperitoneal bladder perforation 10/22/2015    Perivesicular abscess pocket noted during cystoscopy   • Glaucoma    • H/O jaundice teenager    \"yellow jaundice\"   • Hiatal hernia 10/2014    Per EGD report   • History of GI bleed 2010    Multiple episodes requiring colonoscopies, tranfusions   • HTN (hypertension)    • Hx of transfusion 10/2014    GI Bleed   • Hyperlipidemia    • Hypothyroidism    • Metabolic acidosis    • Normal vaginal delivery     X 3   • Secondary hyperparathyroidism (CMS/HCC)    • Spinal stenosis    • Urinary incontinence    • Urinary tract infection    • Uterine carcinoma (CMS/HCC)     S/P CHIQUITA BSO &      Acne rosacea      ASCUS on Pap smear 08/2006     DEAN (generalized anxiety disorder)      Reactive airway disease       Past Surgical History:   Procedure Laterality Date     ARTHROSCOPY KNEE BILATERAL Bilateral 9/4/2015    Procedure: ARTHROSCOPY KNEE BILATERAL;  Surgeon: Tracie Stewart MD;  Location: [facility]     EXCISE MASS UPPER EXTREMITY  10/09    right forearm lipoma     INJECT EPIDURAL CERVICAL / THORACIC SINGLE N/A 3/7/2018    Procedure: INJECT EPIDURAL CERVICAL / THORACIC SINGLE;  Cervical Epidural Steroid Injectioncervical 7, throasic 1;  Surgeon: Levi Conteh MD;  Location: [facility]     KNEE SURGERY  09/2015    Bilateral meniscectomies      No Known Allergies     Anesthesia Evaluation     . Pt has had prior anesthetic. Type: General, MAC and Regional    No history of anesthetic complications          ROS/MED HX    ENT/Pulmonary:      (-) tobacco use, CHRISTINE risk factors and recent URI   Neurologic:     (+)neuropathy - left thumb,     Cardiovascular:  - neg cardiovascular ROS   (+) ----. : . . . :. . Previous cardiac testing date:results:date: results:ECG reviewed date:2015 results:Sinus bradycardia date: results:          METS/Exercise Tolerance:  >4 METS   Hematologic:  - neg hematologic  ROS      (-) history of blood clots and History of Transfusion   Musculoskeletal:   (+) arthritis,  other musculoskeletal- right knee OA      GI/Hepatic:  - neg GI/hepatic ROS      (-) GERD   Renal/Genitourinary:  - ROS Renal section negative       Endo:  - neg endo ROS       Psychiatric:     (+) psychiatric history anxiety      Infectious Disease:  - neg infectious disease ROS      (-) Recent Fever   Malignancy:      - no malignancy   Other:    (+) No chance of pregnancy no H/O Chronic Pain,                       PHYSICAL EXAM:   Mental Status/Neuro: A/A/O   Airway: Facies: Feasible  Mallampati: I  Mouth/Opening: Full  TM distance: > 6 cm  Neck ROM: Full   Respiratory: Auscultation: CTAB     Resp. Rate: Normal    radiation   • Vitamin D deficiency    • Wears glasses        Past Surgical History:   Procedure Laterality Date   • Abdomen surgery     • Colonoscopy  8/12/2015    Dr. Valdez   • Colonoscopy  08/05/2019   • Esophagogastroduodenoscopy  12/21/2018   • Esophagogastroduodenoscopy transoral flex diag N/A 8/4/2019    EGD (ESOPHAGOGASTRODUODENOSCOPY) [place] [facility] [place]   • Eye surgery Right ~2012    Cataract Phacoemulsification with IOL   • Hernia repair  early 1990's    with CHIQUITA - umbilical   • Hernia repair  ~ 2010    multiple abdominal wall hernia repairs   • Hysterectomy  early 1990's    CHIQUITA with BSO for uterine cancer   • Joint replacement Bilateral late 2000s    Total Knee Arthroplasties   • Service to gastroenterology  10/24/2014    EGD - Large Hiatal hernia,  blood noted   • Service to gastroenterology  10/24/2014    Colonoscopy   • Service to gastroenterology  5/25/2015    Colonoscopy with polypectomy   • Service to gastroenterology  7/17/2015    EGD - Erosive Gastritis, Sliding hernia   • Service to gastroenterology  7/20/2015    Endoscopic Capsulotomy - Bleeding AVM in hepatic Flexure   • Service to gastroenterology  8/12/2015    Colonoscopy - Diverticulosis, small ulcerations noted   • Service to urology  10/22/2015    Cystoscopy with retrogrades, bilateral stent placement -        Allergies as of 09/12/2019 - Reviewed 09/12/2019   Allergen Reaction Noted   • Levaquin PRURITUS        Current Outpatient Medications   Medication   • pantoprazole (PROTONIX) 40 MG tablet   • epoetin quyen (PROCRIT,EPOGEN) 88979 UNIT/ML injection   • hydrALAZINE (APRESOLINE) 10 MG tablet   • losartan (COZAAR) 50 MG tablet   • amLODIPine (NORVASC) 10 MG tablet   • PREMARIN 0.625 MG tablet   • Vitamin D, Ergocalciferol, 10238 units capsule   • Lutein-Zeaxanthin (OCUVITE LUTEIN 25 PO)   • acetaminophen (TYLENOL) 650 MG CR tablet   • calcium carbonate (TUMS) 500 MG chewable tablet   • B Complex-C-Folic  "  Resp. Effort: Normal      CV: Rhythm: Regular  Rate: Age appropriate  Heart: Normal Sounds  Edema: None   Comments:      Dental: Normal Dentition                Assessment:   ASA SCORE: 1    H&P: History and physical reviewed and following examination; no interval change.   Smoking Status:  Non-Smoker/Unknown   NPO Status: NPO Appropriate     Plan:   Anes. Type:  MAC; Spinal   Pre-Medication: None   Induction:  N/a   Airway: Native Airway   Access/Monitoring: PIV   Maintenance: N/a     Postop Plan:   Postop Pain: Regional  Postop Sedation/Airway: Not planned  Disposition: Outpatient     PONV Management:   Adult Risk Factors: Female, Non-Smoker   Prevention: Ondansetron, Dexamethasone     CONSENT: Direct conversation   Plan and risks discussed with: Patient          Comments for Plan/Consent:  Discussed spinal, with possible conversion to general anesthesia (per Dr. Senior, surgery should be around 2 hrs). Discussed risks of spinal including bleeding, infection, nerve injury, headache, conversion to general anesthesia. Discussed plan for MAC, including risk of aspiration. Discussed risks of backup general anesthesia, including sore throat/hoarse voice, abrasions/damage to lips/tongue/teeth, nausea, rare complications (including medication reactions, cardiac, pulmonary).                  PAC Discussion and Assessment    ASA Classification: 1  Case is suitable for: ASC  Anesthetic techniques and relevant risks discussed: GA and Regional  Invasive monitoring and risk discussed:   Types:   Possibility and Risk of blood transfusion discussed:   NPO instructions given:   Additional anesthetic preparation and risks discussed:   Needs early admission to pre-op area:   Other:     PAC Resident/NP Anesthesia Assessment:  \"Yoanna\" Camila is a 60 year old female scheduled for a Total Knee Arthroplasty on 10/15/20 by Dr. Stewart in treatment of right knee OA.  PAC referral for risk assessment and optimization for anesthesia with " Acid (DIANE-DANNA) Tab   • latanoprost (XALATAN) 0.005 % ophthalmic solution   • levothyroxine (SYNTHROID, LEVOTHROID) 150 MCG tablet     No current facility-administered medications for this visit.         Family History   Problem Relation Age of Onset   • Arthritis Father    • Cancer Father         pancreas   • Clostridium Difficile Infection Brother        Social History     Tobacco Use   • Smoking status: Never Smoker   • Smokeless tobacco: Never Used   Substance Use Topics   • Alcohol use: No     Alcohol/week: 0.0 standard drinks     Frequency: Monthly or less     Drinks per session: 1 or 2     Binge frequency: Never   • Drug use: No       REVIEW OF SYSTEMS   Amanda Garcia RN  2019  4:03 PM  Sign when Signing Visit  Arina Vela is a 87 year old female here for  Chief Complaint   Patient presents with   • Consultation     Denies latex allergy or sensitivity.    Medication verified, no changes.  PCP and Pharmacy verified.    Social History     Tobacco Use   Smoking Status Never Smoker   Smokeless Tobacco Never Used     Advance Directives Filed: No    ECO - No physically strenuous activity, but ambulatory and able to carry out light or sedentary work.    Height: No.  Ht Readings from Last 1 Encounters:   19 4' 11\" (1.499 m)     Weight:Yes, shoes on.  Wt Readings from Last 3 Encounters:   19 55.3 kg   19 50 kg   19 53.3 kg       BMI: Body mass index is 24.64 kg/m².    REVIEW OF SYSTEMS  GENERAL:  Patient denies headache, fevers, chills, night sweats, excessive fatigue, change in appetite, weight loss, dizziness  ALLERGIC/IMMUNOLOGIC: Verified allergies: Yes  EYES:  Patient denies significant visual difficulties, double vision, blurred vision  ENT/MOUTH: Patient denies problems with hearing, sore throat, sinus drainage, mouth sores  ENDOCRINE:  Patient denies diabetes, thyroid disease, hormone replacement, hot flashes  HEMATOLOGIC/LYMPHATIC: Patient denies easy bruising,  bleeding, tender lymph nodes, swollen lymph nodes  BREASTS: Patient denies abnormal masses of breast, nipple discharge, pain  RESPIRATORY:  Patient denies lung pain with breathing, cough, coughing up blood, shortness of breath  CARDIOVASCULAR:  Patient denies anginal chest pain, palpitations, shortness of breath when lying flat, peripheral edema  GASTROINTESTINAL: Patient denies abdominal pain , nausea, vomiting, diarrhea, GI bleeding, constipation, change in bowel habits, heartburn, sensation of feeling full, difficulty swallowing  : Patient denies abnormal genital masses, blood in the urine, frequency, urgency, burning with urination, hesitancy, incontinence, vaginal bleeding, discharge, pt on dialysis three times a week  MUSCULOSKELETAL:  Patient denies joint pain, bone pain, joint swelling, redness, decreased range of motion, but complains of: low back pain, uses walker  SKIN:  Patient denies chronic rashes, inflammation, ulcerations, skin changes, itching  NEUROLOGIC:  Patient denies loss of balance, areas of focal weakness, abnormal gait, sensory problems, numbness, tingling  PSYCHIATRIC: Patient denies insomnia, depression, anxiety      PHYSICAL EXAMINATION   Vital Signs:    Visit Vitals  /65 (BP Location: [facility], Patient Position: Sitting)   Pulse 83   Temp 98.6 °F (37 °C)   Resp 18   Ht 4' 11\" (1.499 m)   Wt 55.3 kg   SpO2 98%   BMI 24.64 kg/m²      General:  Elderly female appears to be in no acute distress, presents to the clinic ambulatory with the assistance of a walker  and alone .   ENT:  No oral ulcerations, no mucositis, no oropharyngeal masses seen.  Neck:  No neck masses palpable.  Trachea midline.  No thyroid palpable.  Lymphatics:  No cervical, supraclavicular, axillary or inguinal lymph nodes palpable.  Lungs:  Good entry bilaterally, resonant to percussion.  No crepitations or rhonchi.  Cardiovascular:  S1 and S2 with no murmurs.  Regular rhythm.  Abdomen: Soft, nontender.  No liver or  comorbid conditions of: anxiety.    Pre-operative considerations:  1.  Cardiac:  Functional status- METS >4.  She is doing yoga and walking 30 minutes consecutively for exercise.  She has no known cardiac conditions.  Intermediate risk surgery with 0.4% risk of major adverse cardiac event.   2.  Pulm:  Airway feasible.  CHRISTINE risk: low.  3.  GI:  Risk of PONV score = 3.  If > 2, anti-emetic intervention recommended.  4. Musculoskeletal:  Hold diclofenac 1 day prior to surgery.  5. Psych:  Anxiety is well controlled with HRT and lexapro.    VTE risk: 0.5%    Patient is optimized and is acceptable candidate for the proposed procedure.  No further diagnostic evaluation is needed.     **For further details of assessment, testing, and physical exam please see H and P completed on same date.          Mindi Ca DNP, RN, APRN      Reviewed and Signed by PAC Mid-Level Provider/Resident  Mid-Level Provider/Resident: Mindi Ca DNP, RN, APRN  Date: 10/1/20  Time: 1525    Attending Anesthesiologist Anesthesia Assessment:        Anesthesiologist:   Date:   Time:   Pass/Fail:   Disposition:     PAC Pharmacist Assessment:        Pharmacist:   Date:   Time:    Mindi Ca, MAICOL CNP   spleen palpable.  No other masses palpable.  Extremities:  No pedal edema.  No stasis dermatitis.  No tenderness in bilateral calves.  Back: No spinal tenderness or deformity.  Skin:  No area of ecchymosis, petechiae or rash.  Neurological:  Alert, oriented x3.      LAB  No visits with results within 5 Day(s) from this visit.   Latest known visit with results is:   Admission on 08/28/2019, Discharged on 08/30/2019   Component Date Value Ref Range Status   • WBC 08/28/2019 3.9* 4.2 - 11.0 K/mcL Final   • RBC 08/28/2019 2.34* 4.00 - 5.20 mil/mcL Final   • HGB 08/28/2019 7.9* 12.0 - 15.5 g/dL Final   • HCT 08/28/2019 26.3* 36.0 - 46.5 % Final   • MCV 08/28/2019 112.4* 78.0 - 100.0 fl Final   • MCH 08/28/2019 33.8  26.0 - 34.0 pg Final   • MCHC 08/28/2019 30.0* 32.0 - 36.5 g/dL Final   • RDW-CV 08/28/2019 20.5* 11.0 - 15.0 % Final   • PLT 08/28/2019 92* 140 - 450 K/mcL Final   • NRBC 08/28/2019 0  0 /100 WBC Final   • DIFF TYPE 08/28/2019 AUTOMATED DIFFERENTIAL   Final   • Neutrophil 08/28/2019 77  % Final   • LYMPH 08/28/2019 16  % Final   • MONO 08/28/2019 5  % Final   • EOSIN 08/28/2019 1  % Final   • BASO 08/28/2019 0  % Final   • Percent Immature Granuloctyes 08/28/2019 1  % Final   • Absolute Neutrophil 08/28/2019 3.0  1.8 - 7.7 K/mcL Final   • Absolute Lymph 08/28/2019 0.6* 1.0 - 4.0 K/mcL Final   • Absolute Mono 08/28/2019 0.2* 0.3 - 0.9 K/mcL Final   • Absolute Eos 08/28/2019 0.0* 0.1 - 0.5 K/mcL Final   • Absolute Baso 08/28/2019 0.0  0.0 - 0.3 K/mcL Final   • Absolute Immature Granulocytes 08/28/2019 0.0  0 - 0.2 K/mcl Final   • WBC MORPHOLOGY 08/28/2019 NORMAL  NORMAL Final   • PLATELETS APPEAR 08/28/2019 NORMAL  NORMAL Final   • Macrocytosis 08/28/2019 FEW   Final   • Basophilic Stipling 08/28/2019 PRESENT   Final   • Sodium 08/28/2019 139  135 - 145 mmol/L Final   • Potassium 08/28/2019 4.9  3.4 - 5.1 mmol/L Final   • Chloride 08/28/2019 103  98 - 107 mmol/L Final   • Carbon Dioxide 08/28/2019 27  21 - 32  mmol/L Final   • Anion Gap 2019 14  10 - 20 mmol/L Final   • Glucose 2019 99  65 - 99 mg/dL Final   • BUN 2019 74* 6 - 20 mg/dL Final   • Creatinine 2019 3.60* 0.51 - 0.95 mg/dL Final   • GFR Estimate,  2019 12   Final   • GFR Estimate, Non  2019 11   Final   • BUN/Creatinine Ratio 2019 21  7 - 25 Final   • CALCIUM 2019 8.1* 8.4 - 10.2 mg/dL Final   • TOTAL BILIRUBIN 2019 0.3  0.2 - 1.0 mg/dL Final   • AST/SGOT 2019 19  <38 Units/L Final   • ALT/SGPT 2019 24  <64 Units/L Final   • ALK PHOSPHATASE 2019 109  45 - 117 Units/L Final   • TOTAL PROTEIN 2019 6.6  6.4 - 8.2 g/dL Final   • Albumin 2019 2.8* 3.6 - 5.1 g/dL Final   • GLOBULIN 2019 3.8  2.0 - 4.0 g/dL Final   • A/G Ratio, Serum 2019 0.7* 1.0 - 2.4 Final   • ABO/RH(D) 2019 A NEGATIVE   Final   • ANTIBODY SCREEN 2019 NEGATIVE   Final   • CROSSMATCH  2019   Final   • OCCULT BLOOD, STOOL 2019 POSITIVE* NEGATIVE Final   • Lactic Acid Venous 2019 0.7  <2.0 mmol/L Final   • Systolic Blood Pressure 2019 159   Final   • Diastolic Blood Pressure 2019 70   Final   • Ventricular Rate EKG/Min (BPM) 2019 83   Final   • Atrial Rate (BPM) 2019 83   Final   • PA-Interval (MSEC) 2019 200   Final   • QRS-Interval (MSEC) 2019 88   Final   • QT-Interval (MSEC) 2019 396   Final   • QTc 2019 465   Final   • P Axis (Degrees) 2019 53   Final   • R Axis (Degrees) 2019 -6   Final   • T Axis (Degrees) 2019 25   Final   • REPORT TEXT 2019    Final                    Value:Normal sinus rhythm  Minimal voltage criteria for LVH, may be normal variant  Nonspecific ST and T wave abnormality  Abnormal ECG  When compared with ECG of  04-AUG-2019 16:21,  No significant change was found  Confirmed by THERESA GORDON MD ([id number]),  Judith Zamarripa  ([id number]) on 8/29/2019 10:39:02 AM     • PROTIME 08/28/2019 10.7  9.7 - 11.8 sec Final   • INR 08/28/2019 1.0   Final   • HGB 08/28/2019 8.0* 12.0 - 15.5 g/dL Final   • HCT 08/28/2019 26.0* 36.0 - 46.5 % Final   • HGB 08/28/2019 7.7* 12.0 - 15.5 g/dL Final   • HCT 08/28/2019 24.9* 36.0 - 46.5 % Final   • HEP B SURF AB QUANT 08/28/2019 <3.10  mUnits/mL Final   • HEP B Surface AG 08/29/2019 NEGATIVE  NEGATIVE Final   • WBC 08/29/2019 2.3* 4.2 - 11.0 K/mcL Final   • RBC 08/29/2019 2.20* 4.00 - 5.20 mil/mcL Final   • HGB 08/29/2019 7.4* 12.0 - 15.5 g/dL Final   • HCT 08/29/2019 24.2* 36.0 - 46.5 % Final   • MCV 08/29/2019 110.0* 78.0 - 100.0 fl Final   • MCH 08/29/2019 33.6  26.0 - 34.0 pg Final   • MCHC 08/29/2019 30.6* 32.0 - 36.5 g/dL Final   • RDW-CV 08/29/2019 19.7* 11.0 - 15.0 % Final   • PLT 08/29/2019 81* 140 - 450 K/mcL Final   • NRBC 08/29/2019 0  0 /100 WBC Final   • DIFF TYPE 08/29/2019 AUTOMATED DIFFERENTIAL   Final   • Neutrophil 08/29/2019 57  % Final   • LYMPH 08/29/2019 31  % Final   • MONO 08/29/2019 9  % Final   • EOSIN 08/29/2019 2  % Final   • BASO 08/29/2019 1  % Final   • Percent Immature Granuloctyes 08/29/2019 0  % Final   • Absolute Neutrophil 08/29/2019 1.3* 1.8 - 7.7 K/mcL Final   • Absolute Lymph 08/29/2019 0.7* 1.0 - 4.0 K/mcL Final   • Absolute Mono 08/29/2019 0.2* 0.3 - 0.9 K/mcL Final   • Absolute Eos 08/29/2019 0.0* 0.1 - 0.5 K/mcL Final   • Absolute Baso 08/29/2019 0.0  0.0 - 0.3 K/mcL Final   • Absolute Immature Granulocytes 08/29/2019 0.0  0 - 0.2 K/mcl Final   • Sodium 08/29/2019 139  135 - 145 mmol/L Final   • Potassium 08/29/2019 3.7  3.4 - 5.1 mmol/L Final   • Chloride 08/29/2019 105  98 - 107 mmol/L Final   • Carbon Dioxide 08/29/2019 25  21 - 32 mmol/L Final   • Anion Gap 08/29/2019 13  10 - 20 mmol/L Final   • Glucose 08/29/2019 70  65 - 99 mg/dL Final   • BUN 08/29/2019 23* 6 - 20 mg/dL Final   • Creatinine 08/29/2019 1.96* 0.51 - 0.95 mg/dL Final   • GFR Estimate, African  American 08/29/2019 26   Final   • GFR Estimate, Non  08/29/2019 22   Final   • BUN/Creatinine Ratio 08/29/2019 12  7 - 25 Final   • CALCIUM 08/29/2019 7.9* 8.4 - 10.2 mg/dL Final   • TOTAL BILIRUBIN 08/29/2019 0.4  0.2 - 1.0 mg/dL Final   • AST/SGOT 08/29/2019 15  <38 Units/L Final   • ALT/SGPT 08/29/2019 19  <64 Units/L Final   • ALK PHOSPHATASE 08/29/2019 92  45 - 117 Units/L Final   • TOTAL PROTEIN 08/29/2019 5.6* 6.4 - 8.2 g/dL Final   • Albumin 08/29/2019 2.4* 3.6 - 5.1 g/dL Final   • GLOBULIN 08/29/2019 3.2  2.0 - 4.0 g/dL Final   • A/G Ratio, Serum 08/29/2019 0.8* 1.0 - 2.4 Final   • HGB 08/29/2019 8.2* 12.0 - 15.5 g/dL Final   • HCT 08/29/2019 26.5* 36.0 - 46.5 % Final   • HGB 08/29/2019 8.2* 12.0 - 15.5 g/dL Final   • HCT 08/29/2019 26.4* 36.0 - 46.5 % Final   • IRON 08/29/2019 42* 50 - 170 mcg/dL Final   • Total Iron Binding Capacity 08/29/2019 193* 250 - 450 mcg/dL Final   • PERCENT IRON SATURATION 08/29/2019 22  15 - 45 % Final   • Ferritin 08/29/2019 668* 8 - 252 ng/mL Final   • BUN 08/30/2019 43* 6 - 20 mg/dL Final   • Creatinine 08/30/2019 3.25* 0.51 - 0.95 mg/dL Final   • GFR Estimate,  08/30/2019 14   Final   • GFR Estimate, Non  08/30/2019 12   Final   • Sodium 08/30/2019 139  135 - 145 mmol/L Final   • Potassium 08/30/2019 3.9  3.4 - 5.1 mmol/L Final   • Chloride 08/30/2019 106  98 - 107 mmol/L Final   • Carbon Dioxide 08/30/2019 24  21 - 32 mmol/L Final   • CALCIUM 08/30/2019 7.8* 8.4 - 10.2 mg/dL Final   • PHOSPHORUS 08/30/2019 4.6  2.4 - 4.7 mg/dL Final   • Albumin 08/30/2019 2.5* 3.6 - 5.1 g/dL Final   • Glucose 08/30/2019 76  65 - 99 mg/dL Final   • Anion Gap 08/30/2019 13  10 - 20 mmol/L Final   • BUN/Creatinine Ratio 08/30/2019 13  7 - 25 Final   • WBC 08/30/2019 2.5* 4.2 - 11.0 K/mcL Final   • RBC 08/30/2019 2.26* 4.00 - 5.20 mil/mcL Final   • HGB 08/30/2019 7.7* 12.0 - 15.5 g/dL Final   • HCT 08/30/2019 24.8* 36.0 - 46.5 % Final   • MCV  08/30/2019 109.7* 78.0 - 100.0 fl Final   • MCH 08/30/2019 34.1* 26.0 - 34.0 pg Final   • MCHC 08/30/2019 31.0* 32.0 - 36.5 g/dL Final   • RDW-CV 08/30/2019 18.9* 11.0 - 15.0 % Final   • PLT 08/30/2019 84* 140 - 450 K/mcL Final   • NRBC 08/30/2019 0  0 /100 WBC Final          DIAGNOSTIC STUDIES  Ir Consult Service To Ir    Result Date: 8/6/2019  For details please see note in Epic    Ct Angio Head And Neck Level 1    Result Date: 8/4/2019  CT ANGIOGRAM HEAD AND NECK W CONTRAST LEVEL 1 HISTORY: Focal neuro deficit, < 6 hrs, stroke suspected COMPARISON: CT head from 8/4/2019. TECHNIQUE:  0.5 mm helical axial images were obtained through the [place] and neck arterial vasculature after the administration of 75 mL of intravenous Isovue 370.  Coronal and sagittal reformats as well as 3-D reconstructions were created. Postcontrast  5 mm images were obtained from the skull base to vertex. FINDINGS: CTA neck: Aortic arch and great vessels: There is normal aortic arch anatomy. The great vessels are patent. Right common carotid: Patent. Left common carotid: Patent. Right internal carotid: There is moderate calcified atherosclerotic plaque in the proximal internal carotid artery but no significant luminal narrowing is appreciated. Left internal carotid: There is mild calcified atherosclerotic plaque in the proximal internal carotid artery but no significant luminal narrowing is appreciated. Vertebral arteries: There is mild narrowing of the right vertebral artery origin. The cervical vertebral arteries are patent. Other findings: Moderate degenerative changes in the cervical spine. CTA head: Anterior circulation: Internal carotid arteries: The distal cervical, petrous, cavernous, and supraclinoid internal carotid arteries are patent despite scattered others carotid plaque. Anterior cerebral arteries:Unremarkable. Middle cerebral arteries: Unremarkable Posterior circulation: Vertebral arteries: Mild-to-moderate  narrowing of the left and mild narrowing of the right V4 segment due to calcified atherosclerotic plaque. Basilar artery: Unremarkable. Posterior cerebral arteries: Unremarkable. Incidental fetal origin of the right posterior cerebral artery. Cerebellar arteries: Unremarkable. Collateral circulation: The anterior communicating artery is patent. The left posterior communicating artery is diminutive/hypoplastic. The right posterior communicating artery is patent, given rise to a fetal right PCA. CT head: No mass effect, shift or abnormal enhancement is appreciated. The ventricles and sulci are mildly prominent, consistent with age-related volume loss. Low attenuation in the white matter is consistent with chronic microvascular ischemic changes.     IMPRESSION: 1.  Unremarkable CTA of the neck. 2.  No large vessel occlusion in the [place]. 3.  Mild to moderate narrowing of the V4 segment of the left vertebral artery and mild narrowing of the V4 segment of the right vertebral artery.     Ct Head Level 1    Result Date: 8/4/2019    Result Date: 8/4/2019  XR TUBE CHECK CHEST,  8/4/2019 at 1740 hours. INDICATION: Central line placement. COMPARISON: 3/5/2019.     FINDINGS/IMPRESSION: Right internal jugular dialysis catheter tip extends to the right atrium. Left subclavian catheter tip at the junction of the superior vena cava and right atrium. Minimal airspace opacity at the left lung base. Minimal blunting of the left costophrenic angle. Interstitial thickening throughout both lungs with peripheral kerley B lines noted bilaterally. This may relate to an element of underlying fibrosis. Normal cardiac and mediastinal contours. No pneumothorax. Degenerative changes of both shoulders.     Ct Angiogram Abdomen Pelvis    Result Date: 8/6/2019  CT ANGIOGRAM ABDOMEN PELVIS W WO CONTRAST HISTORY:  GI bleed. COMPARISONS:  None. TECHNIQUE: Contiguous axial images were obtained through the abdomen and pelvis before and after  the administration of 100  mL of Isovue 370. Postcontrast imaging was obtained in the arterial and portal venous phases. Oral contrast was not administered. Coronal and sagittal reformatted images were obtained. FINDINGS: Lung Bases: Small bilateral pleural effusions with associated consolidation/atelectasis. Heart: No significant abnormality. Liver: Morphology suggestive of chronic liver disease. Biliary / Gallbladder: Cholelithiasis. Spleen: Splenic granulomas. Pancreas: No significant abnormality. Adrenal Glands: No significant abnormality. Kidneys: Mild hydroureteronephrosis, left greater than right, with associated renal cortical atrophy. Bowel: No evidence of bowel obstruction. Scattered diverticula. No evidence of contrast extravasation into the bowel to suggest gastrointestinal hemorrhage. The examination is limited by the lack of intraluminal contrast on noncontrast phase. Endoscopically placed clips noted in the sigmoid colon. Lymph Nodes: No significant abnormality. Vascular: Severe vascular calcifications. Bladder: Nondistended. Uterus/adnexa: Status post hysterectomy. Bones: Degenerative changes affect the visualized skeleton. Soft Tissues: No significant abnormality.     IMPRESSION: 1.  Limited examination due to the presence of intraluminal contrast on noncontrast phase. Within this limitation, there is no convincing evidence of acute gastrointestinal hemorrhage. 2.  Small bilateral pleural effusions with associated consolidation/atelectasis. 3.  Likely chronic bilateral hydroureteronephrosis and additional findings as described above.       ASSESSMENT  1. Iron deficiency anemia due to chronic blood loss    2. Anemia in chronic kidney disease, on chronic dialysis (CMS/HCC)         PLAN  This is a patient who has been referred by Marv Tolbert MD for the above-mentioned problem.  We appreciate the referral.     Patient was been referred to us for evaluation of pancytopenia. She does have significant  anemia which is likely multifactorial including because of his chronic kidney disease. She also has not had episodes of GI bleeding leading to associated anemia and probable iron deficiency.    However of some concern is the fact that she also has leukopenia and associated thrombocytopenia.    Again this could be due to her multiple comorbidities also secondary to her medications but we will like to rule out the possibility of underlying blood disorders.    I'm planning to initiate a workup including checking her for B12 folic acid and repeating her iron studies and ferritin and also will do a immunoelectrophoresis.    We'll also do a haptoglobin.    Subsequently we may have to check her copper level also.    I did talk to her that in her case there would be a high suspicion of myelodysplastic syndrome at this point she is very reluctant to get a bone marrow aspiration and biopsy. I do detailed discussion with her about the procedure and also did a brief simulation of the procedure. She would like to delay it and defer it if it is possible.    FOLLOW-UP:   Return in about 1 week (around 9/19/2019) for visit and labs. with repeat CBC chemistry immunoelectrophoresis ferritin and iron studies B12 folic acid haptoglobin.    All of the patient's questions were answered to her satisfaction.

## 2020-10-01 NOTE — H&P
"  Pre-Operative H & P     CC:  Preoperative exam to assess for increased cardiopulmonary risk while undergoing surgery and anesthesia.    Date of Encounter: 10/1/2020  Primary Care Physician:  Eryn Caro  Associated diagnosis: right knee OA    HPI  Barby Jensen is a 60 year old female who presents for pre-operative H & P in preparation for a Total Knee Arthroplasty on 10/15/20 by Dr. Stewart at [facility].     \"Yoanna\"Camila is a 60 year old female with anxiety that has right knee OA.  She is s/p bilateral knee arthroscopies and medial meniscectomies.  She has arthritis related pain and notes walking and stairs to be aggravating.  She has managed the pain thus far non-operatively with diclofenac, cortisone injections and bracing.  She has been consulting with Dr. Stewart for over a year know for he knee arthritis and the above listed surgery has now been recommended for further treatment.    History is obtained from the patient and the medical record.     Past Medical History  Past Medical History:   Diagnosis Date     Acne rosacea      ASCUS on Pap smear 08/2006     DEAN (generalized anxiety disorder)      Reactive airway disease        Past Surgical History  Past Surgical History:   Procedure Laterality Date     ARTHROSCOPY KNEE BILATERAL Bilateral 09/04/2015    Procedure: ARTHROSCOPY KNEE BILATERAL;  Surgeon: Tracie Stewart MD;  [facility]: [facility]     COLONOSCOPY       EXCISE MASS UPPER EXTREMITY  10/2009    right forearm lipoma     INJECT EPIDURAL CERVICAL / THORACIC SINGLE N/A 03/07/2018    Procedure: INJECT EPIDURAL CERVICAL / THORACIC SINGLE;  Cervical Epidural Steroid Injectioncervical 7, throasic 1;  Surgeon: Levi Conteh MD;  Location:  OR     KNEE SURGERY  09/2015    Bilateral meniscectomies       Hx of Blood transfusions/reactions: none    Hx of abnormal bleeding or anti-platelet use: none    Menstrual history: No LMP recorded. Patient is postmenopausal.: "     Steroid use in the last year: none    Personal or FH with difficulty with Anesthesia:  none    Prior to Admission Medications  Current Outpatient Medications   Medication Sig Dispense Refill     acetaminophen (TYLENOL) 500 MG tablet Take 500-1,000 mg by mouth every 6 hours as needed for mild pain       Calcium-Vitamin D (CALCIUM + D PO) Take 1 tablet by mouth every evening        diclofenac (VOLTAREN) 50 MG EC tablet Take 1 tablet (50 mg) by mouth 3 times daily as needed for moderate pain 90 tablet 2     escitalopram (LEXAPRO) 10 MG tablet Take 1 tablet (10 mg) by mouth daily (Patient taking differently: Take 10 mg by mouth every morning ) 90 tablet 3     Glucosamine-Chondroit-Vit C-Mn (GLUCOSAMINE 1500 COMPLEX PO) Take by mouth every evening        Multiple Vitamins-Minerals (PRESERVISION AREDS) CAPS Take one daily (Patient taking differently: every evening Take one daily) 90 capsule 3     norethindrone-ethinyl estradiol (FEMHRT 1/5) 1-5 MG-MCG tablet Take 1 tablet by mouth daily (Patient taking differently: Take 1 tablet by mouth every morning ) 84 tablet 3     sulfacetamide sodium, Acne, (KLARON) 10 % lotion Apply once at night - needs clinic visit (Patient taking differently: Apply topically every morning Apply once at night - needs clinic visit) 118 mL 0       Allergies  No Known Allergies    Social History  Social History     Socioeconomic History     Marital status:      Spouse name: Luiz      Number of children: 2     Years of education: Not on file     Highest education level: Not on file   Occupational History     Occupation: RN     Comment: [employer]   Social Needs     Financial resource strain: Not on file     Food insecurity     Worry: Not on file     Inability: Not on file     Transportation needs     Medical: Not on file     Non-medical: Not on file   Tobacco Use     Smoking status: Never Smoker     Smokeless tobacco: Never Used   Substance and Sexual Activity     Alcohol  use: Yes     Comment: 1-2 glasses wine per week     Drug use: No     Sexual activity: Yes     Partners: Male     Birth control/protection: Post-menopausal   Lifestyle     Physical activity     Days per week: Not on file     Minutes per session: Not on file     Stress: Not on file   Relationships     Social connections     Talks on phone: Not on file     Gets together: Not on file     Attends Judaism service: Not on file     Active member of club or organization: Not on file     Attends meetings of clubs or organizations: Not on file     Relationship status: Not on file     Intimate partner violence     Fear of current or ex partner: Not on file     Emotionally abused: Not on file     Physically abused: Not on file     Forced sexual activity: Not on file   Other Topics Concern     Parent/sibling w/ CABG, MI or angioplasty before 65F 55M? No   Social History Narrative     Not on file       Family History  Family History   Problem Relation Age of Onset     Gastrointestinal Disease Mother      Hearing Loss Mother      Hypertension Mother      Heart Failure Mother      Osteopenia Mother      Atrial fibrillation Mother         pacemaker     Pacemaker Mother      Hearing Loss Father      Prostate Cancer Father      Eye Disorder Father         macular degeneration     Osteopenia Father         after tx for prostate cancer     Osteoarthritis Father 72        knee replacement     Macular Degeneration Father      Depression Paternal Grandmother      Osteoarthritis Brother 62        knee replacement     Atrial fibrillation Brother                ROS/MED HX  The complete review of systems is negative other than noted in the HPI or here.   ENT/Pulmonary:      (-) tobacco use, CHRISTINE risk factors and recent URI   Neurologic:     (+)neuropathy - left thumb,     Cardiovascular:  - neg cardiovascular ROS   (+) ----. : . . . :. . Previous cardiac testing date:results:date: results:ECG reviewed date:2015 results:Sinus bradycardia date:  "results:          METS/Exercise Tolerance:  >4 METS   Hematologic:  - neg hematologic  ROS      (-) history of blood clots and History of Transfusion   Musculoskeletal:   (+) arthritis,  other musculoskeletal- right knee OA      GI/Hepatic:  - neg GI/hepatic ROS       Renal/Genitourinary:  - ROS Renal section negative       Endo:  - neg endo ROS       Psychiatric:     (+) psychiatric history anxiety      Infectious Disease:  - neg infectious disease ROS      (-) Recent Fever   Malignancy:      - no malignancy   Other:    (+) No chance of pregnancy no H/O Chronic Pain,                       PHYSICAL EXAM:   Mental Status/Neuro: A/A/O   Airway: Facies: Feasible  Mallampati: I  Mouth/Opening: Full  TM distance: > 6 cm  Neck ROM: Full   Respiratory: Auscultation: CTAB     Resp. Rate: Normal     Resp. Effort: Normal      CV: Rhythm: Regular  Rate: Age appropriate  Heart: Normal Sounds  Edema: None   Comments:      Dental: Normal Dentition                Temp: 98  F (36.7  C) Temp src: Oral BP: 104/69 Pulse: 78   Resp: 12 SpO2: 99 %         157 lbs 0 oz  5' 8\"[pt reported[   Body mass index is 23.87 kg/m .       Physical Exam  Constitutional: Awake, alert, cooperative, no apparent distress, and appears stated age.  Eyes: Pupils equal, round and reactive to light, extra ocular muscles intact, sclera clear, conjunctiva normal.  HENT: Normocephalic, oral pharynx with moist mucus membranes, good dentition. No goiter appreciated.   Respiratory: Clear to auscultation bilaterally, no crackles or wheezing.  Cardiovascular: Regular rate and rhythm, normal S1 and S2, and no murmur noted.  Carotids +2, no bruits. No edema. Palpable pulses to radial  DP and PT arteries.   GI: Normal bowel sounds, soft, non-distended, non-tender, no masses palpated, no hepatosplenomegaly.    Lymph/Hematologic: No cervical lymphadenopathy and no supraclavicular lymphadenopathy.  Genitourinary: deferred  Skin: Warm and dry.    Musculoskeletal: Full ROM " "of neck. There is no redness, warmth, or swelling of the exposed joints. Gross motor strength is normal.    Neurologic: Awake, alert, oriented to name, place and time. Cranial nerves II-XII are grossly intact. Gait is normal.   Neuropsychiatric: Calm, cooperative. Normal affect.     Labs: (personally reviewed)   Component      Latest Ref Rng & Units 10/1/2020   Sodium      133 - 144 mmol/L 138   Potassium      3.4 - 5.3 mmol/L 4.1   Chloride      94 - 109 mmol/L 109   Carbon Dioxide      20 - 32 mmol/L 22   Anion Gap      3 - 14 mmol/L 7   Glucose      70 - 99 mg/dL 74   Urea Nitrogen      7 - 30 mg/dL 18   Creatinine      0.52 - 1.04 mg/dL 0.85   GFR Estimate      >60 mL/min/1.73:m2 74   GFR Estimate If Black      >60 mL/min/1.73:m2 86   Calcium      8.5 - 10.1 mg/dL 8.7   WBC      4.0 - 11.0 10e9/L 6.6   RBC Count      3.8 - 5.2 10e12/L 4.36   Hemoglobin      11.7 - 15.7 g/dL 13.9   Hematocrit      35.0 - 47.0 % 41.5   MCV      78 - 100 fl 95   MCH      26.5 - 33.0 pg 31.9   MCHC      31.5 - 36.5 g/dL 33.5   RDW      10.0 - 15.0 % 12.2   Platelet Count      150 - 450 10e9/L 215   ** Patient requested a MRSA nasal swab test (no longer protocol prior to joint replacement), but our department no longer carries the correct swabs due to protocol changes.  Lab also doesn't do the test.  Order left in place.  Patient may consider making a nurse visit at primary care to complete the test.    EK  Sinus bradycardia      Outside records reviewed from: Care Everywhere    ASSESSMENT and PLAN  \"Yoanna\" Camila is a 60 year old female scheduled for a Total Knee Arthroplasty on 10/15/20 by Dr. Stewart in treatment of right knee OA.  PAC referral for risk assessment and optimization for anesthesia with comorbid conditions of: anxiety.    Pre-operative considerations:  1.  Cardiac:  Functional status- METS >4.  She is doing yoga and walking 30 minutes consecutively for exercise.  She has no known cardiac conditions.  " Intermediate risk surgery with 0.4% risk of major adverse cardiac event.   2.  Pulm:  Airway feasible.  CHRISTINE risk: low.  3.  GI:  Risk of PONV score = 3.  If > 2, anti-emetic intervention recommended.  4. Musculoskeletal:  Hold diclofenac 1 day prior to surgery.  5. Psych:  Anxiety is well controlled with HRT and lexapro.    VTE risk: 0.5%    Patient is optimized and is acceptable candidate for the proposed procedure.  No further diagnostic evaluation is needed.         Mindi Ca DNP, RN, APRN  Preoperative Assessment Center  [facility]  [facility] and [facility]  Phone: [phone]  Fax: [phone]

## 2020-10-01 NOTE — PATIENT INSTRUCTIONS
Preparing for Your Surgery      Name:  Barby Jensen   MRN:  [id number]   :  1960   Today's Date:  10/1/2020         Arriving for surgery:  Surgery date:  10/15/20  Arrival time:  6:15 am    Restrictions due to COVID 19:  You may have 1 visitor with you at the Surgery Center   parking is not available     Please come to:    [facility]  [address] [place] [id number]    Please check in on the 5th floor at the Ambulatory Surgery Center     What can I eat or drink?    -  You may eat and drink normally until 8 hours before surgery. (Until 11:45 pm)  -  You may have clear liquids up to 4 hours before surgery. (Until 3:45 am)    Examples of clear liquids:  Water  Clear broth  Juices (apple, white grape, white cranberry  and cider) without pulp  Noncarbonated, powder based beverages  (lemonade and Layton-Aid)  Sodas (Sprite, 7-Up, ginger ale and seltzer)  Coffee or tea (without milk or cream)  Gatorade    --No alcohol for at least 24 hours before surgery    Which medicines can I take?    Hold aspirin for 7 days before surgery.   Hold multivitamins for 7 days before surgery.  Hold supplements for 7 days before surgery.  Hold Ibuprofen (Advil, Motrin) for 1 day before surgery--unless otherwise directed by surgeon.  Hold Naproxen (Aleve) for 4 days before surgery.  **Hold Diclofenac (Voltaren) 1 day before surgery.**    -  PLEASE TAKE the following medications the day of surgery:  Acetaminophen (Tylenol)  Escitalopram (Lexapro)   Norethindron-ethinyl Estradiol (Femhrt)      How do I prepare myself?  - Please take 2 showers before surgery using Scrubcare or Hibiclens soap.    Use this soap only from the neck to your toes.     Leave the soap on your skin for one minute--then rinse thoroughly.      You may use your own shampoo and conditioner; no other hair products.   - Please remove all jewelry and body piercings.  - No lotions, deodorants or fragrance.  - No makeup or  fingernail polish.   - Bring your ID and insurance card.        - All patients are required to have a Covid-19 test within 4 days of surgery/procedure.      -Patients will be contacted by the [facility] scheduling team within 1 week of surgery to make an appointment.      - Patients may call the Scheduling team at [phone] if they have not been scheduled within 4 days of  surgery.      ALL PATIENTS ARE REQUIRED TO HAVE A RESPONSIBLE ADULT TO DRIVE AND BE IN ATTENDANCE WITH THEM FOR 24 HOURS FOLLOWING SURGERY       Questions or Concerns:    -For questions regarding the day of surgery please contact the Ambulatory Surgery Center at [phone].    -If you have health changes between today and your surgery please contact your surgeon.     For questions after surgery please call your surgeons office.

## 2020-10-02 NOTE — RESULT ENCOUNTER NOTE
Julian Dior,    Your test results are attached.  Both of your labs are normal and good for surgery.         Mindi Ca DNP, RN, ANP-C

## 2020-10-13 DIAGNOSIS — Z11.59 ENCOUNTER FOR SCREENING FOR OTHER VIRAL DISEASES: ICD-10-CM

## 2020-10-13 PROCEDURE — U0003 INFECTIOUS AGENT DETECTION BY NUCLEIC ACID (DNA OR RNA); SEVERE ACUTE RESPIRATORY SYNDROME CORONAVIRUS 2 (SARS-COV-2) (CORONAVIRUS DISEASE [COVID-19]), AMPLIFIED PROBE TECHNIQUE, MAKING USE OF HIGH THROUGHPUT TECHNOLOGIES AS DESCRIBED BY CMS-2020-01-R: HCPCS | Mod: 90 | Performed by: PATHOLOGY

## 2020-10-13 PROCEDURE — 99000 SPECIMEN HANDLING OFFICE-LAB: CPT | Performed by: PATHOLOGY

## 2020-10-13 PROCEDURE — 36415 COLL VENOUS BLD VENIPUNCTURE: CPT | Performed by: PATHOLOGY

## 2020-10-14 LAB
SARS-COV-2 RNA SPEC QL NAA+PROBE: NOT DETECTED
SPECIMEN SOURCE: NORMAL

## 2020-10-15 ENCOUNTER — ANESTHESIA (OUTPATIENT)
Dept: SURGERY | Facility: CLINIC | Age: 60
End: 2020-10-15
Payer: COMMERCIAL

## 2020-10-15 ENCOUNTER — TELEPHONE (OUTPATIENT)
Dept: ORTHOPEDICS | Facility: CLINIC | Age: 60
End: 2020-10-15

## 2020-10-15 ENCOUNTER — PREP FOR PROCEDURE (OUTPATIENT)
Dept: ORTHOPEDICS | Facility: CLINIC | Age: 60
End: 2020-10-15

## 2020-10-15 NOTE — TELEPHONE ENCOUNTER
Yoanna was called back about the status of her surgery to be outpatient with overnight observation per present [facility] standards.  Zenia Ravi RN

## 2020-10-15 NOTE — TELEPHONE ENCOUNTER
[facility] Call Center    Phone Message    May a detailed message be left on voicemail: yes     Reason for Call: Other: call back     Action Taken: Message routed to:  [facility] ([facility]): Braden Senior     Travel Screening: Not Applicable     Patient's insurance does not cover the surgery with   as an In Patient -- she needs to be switched to an Observation patient .    Please have Zenia call her if this changes anything

## 2020-10-16 ENCOUNTER — APPOINTMENT (OUTPATIENT)
Dept: GENERAL RADIOLOGY | Facility: CLINIC | Age: 60
End: 2020-10-16
Attending: ORTHOPAEDIC SURGERY
Payer: COMMERCIAL

## 2020-10-16 ENCOUNTER — HOSPITAL ENCOUNTER (OUTPATIENT)
Facility: CLINIC | Age: 60
Discharge: HOME | End: 2020-10-17
Attending: ORTHOPAEDIC SURGERY | Admitting: ORTHOPAEDIC SURGERY
Payer: COMMERCIAL

## 2020-10-16 DIAGNOSIS — M17.11 ARTHRITIS OF RIGHT KNEE: ICD-10-CM

## 2020-10-16 DIAGNOSIS — Z98.890 STATUS POST KNEE SURGERY: Primary | ICD-10-CM

## 2020-10-16 LAB
ABO + RH BLD: NORMAL
ABO + RH BLD: NORMAL
BLD GP AB SCN SERPL QL: NORMAL
BLOOD BANK CMNT PATIENT-IMP: NORMAL
GLUCOSE SERPL-MCNC: 98 MG/DL (ref 70–99)
SPECIMEN EXP DATE BLD: NORMAL

## 2020-10-16 PROCEDURE — 250N000009 HC RX 250: Performed by: ORTHOPAEDIC SURGERY

## 2020-10-16 PROCEDURE — 99207 PR CDG-CODE CATEGORY CHANGED: CPT | Performed by: INTERNAL MEDICINE

## 2020-10-16 PROCEDURE — 250N000011 HC RX IP 250 OP 636: Performed by: ORTHOPAEDIC SURGERY

## 2020-10-16 PROCEDURE — 258N000003 HC RX IP 258 OP 636: Performed by: NURSE ANESTHETIST, CERTIFIED REGISTERED

## 2020-10-16 PROCEDURE — 250N000009 HC RX 250: Performed by: NURSE ANESTHETIST, CERTIFIED REGISTERED

## 2020-10-16 PROCEDURE — 36415 COLL VENOUS BLD VENIPUNCTURE: CPT | Performed by: ANESTHESIOLOGY

## 2020-10-16 PROCEDURE — C1776 JOINT DEVICE (IMPLANTABLE): HCPCS | Performed by: ORTHOPAEDIC SURGERY

## 2020-10-16 PROCEDURE — 370N000002 HC ANESTHESIA TECHNICAL FEE, EACH ADDTL 15 MIN: Performed by: ORTHOPAEDIC SURGERY

## 2020-10-16 PROCEDURE — 278N000051 HC OR IMPLANT GENERAL: Performed by: ORTHOPAEDIC SURGERY

## 2020-10-16 PROCEDURE — 250N000011 HC RX IP 250 OP 636: Performed by: ANESTHESIOLOGY

## 2020-10-16 PROCEDURE — 250N000009 HC RX 250: Performed by: INTERNAL MEDICINE

## 2020-10-16 PROCEDURE — 258N000003 HC RX IP 258 OP 636: Performed by: ORTHOPAEDIC SURGERY

## 2020-10-16 PROCEDURE — 73560 X-RAY EXAM OF KNEE 1 OR 2: CPT | Mod: 26 | Performed by: RADIOLOGY

## 2020-10-16 PROCEDURE — 761N000003 HC RECOVERY PHASE 1 LEVEL 2 FIRST HR: Performed by: ORTHOPAEDIC SURGERY

## 2020-10-16 PROCEDURE — 250N000011 HC RX IP 250 OP 636: Performed by: NURSE ANESTHETIST, CERTIFIED REGISTERED

## 2020-10-16 PROCEDURE — 360N000028 HC SURGERY LEVEL 4 1ST 30 MIN - UMMC: Performed by: ORTHOPAEDIC SURGERY

## 2020-10-16 PROCEDURE — 999N000140 HC STATISTIC PRE-PROCEDURE ASSESSMENT III: Performed by: ORTHOPAEDIC SURGERY

## 2020-10-16 PROCEDURE — 250N000013 HC RX MED GY IP 250 OP 250 PS 637: Performed by: ORTHOPAEDIC SURGERY

## 2020-10-16 PROCEDURE — 272N000001 HC OR GENERAL SUPPLY STERILE: Performed by: ORTHOPAEDIC SURGERY

## 2020-10-16 PROCEDURE — 999N000065 XR KNEE PORT LT 1/2 VW: Mod: LT

## 2020-10-16 PROCEDURE — 999N000065 XR KNEE PORT RT 1/2 VW: Mod: RT

## 2020-10-16 PROCEDURE — 250N000013 HC RX MED GY IP 250 OP 250 PS 637: Performed by: ANESTHESIOLOGY

## 2020-10-16 PROCEDURE — 258N000001 HC RX 258: Performed by: ORTHOPAEDIC SURGERY

## 2020-10-16 PROCEDURE — C1713 ANCHOR/SCREW BN/BN,TIS/BN: HCPCS | Performed by: ORTHOPAEDIC SURGERY

## 2020-10-16 PROCEDURE — 250N000009 HC RX 250: Performed by: STUDENT IN AN ORGANIZED HEALTH CARE EDUCATION/TRAINING PROGRAM

## 2020-10-16 PROCEDURE — 86901 BLOOD TYPING SEROLOGIC RH(D): CPT | Performed by: ANESTHESIOLOGY

## 2020-10-16 PROCEDURE — 360N000029 HC SURGERY LEVEL 4 EA 15 ADDTL MIN - UMMC: Performed by: ORTHOPAEDIC SURGERY

## 2020-10-16 PROCEDURE — 370N000001 HC ANESTHESIA TECHNICAL FEE, 1ST 30 MIN: Performed by: ORTHOPAEDIC SURGERY

## 2020-10-16 PROCEDURE — 86900 BLOOD TYPING SEROLOGIC ABO: CPT | Performed by: ANESTHESIOLOGY

## 2020-10-16 PROCEDURE — 82947 ASSAY GLUCOSE BLOOD QUANT: CPT | Performed by: ANESTHESIOLOGY

## 2020-10-16 PROCEDURE — 250N000003 HC SEVOFLURANE, EA 15 MIN: Performed by: ORTHOPAEDIC SURGERY

## 2020-10-16 PROCEDURE — 761N000004 HC RECOVERY PHASE 1 LEVEL 2 EA ADDTL HR: Performed by: ORTHOPAEDIC SURGERY

## 2020-10-16 PROCEDURE — 99214 OFFICE O/P EST MOD 30 MIN: CPT | Performed by: INTERNAL MEDICINE

## 2020-10-16 PROCEDURE — 86850 RBC ANTIBODY SCREEN: CPT | Performed by: ANESTHESIOLOGY

## 2020-10-16 DEVICE — TIBIAL INSERT
Type: IMPLANTABLE DEVICE | Site: KNEE | Status: FUNCTIONAL
Brand: TRIATHLON

## 2020-10-16 DEVICE — FEMORAL COMPONENT
Type: IMPLANTABLE DEVICE | Site: KNEE | Status: FUNCTIONAL
Brand: TRIATHLON

## 2020-10-16 DEVICE — TIBIAL BASEPLATE
Type: IMPLANTABLE DEVICE | Site: KNEE | Status: FUNCTIONAL
Brand: TRIATHLON

## 2020-10-16 DEVICE — BONE CEMENT SIMPLEX FULL DOSE 6191-1-001: Type: IMPLANTABLE DEVICE | Site: KNEE | Status: FUNCTIONAL

## 2020-10-16 RX ORDER — OXYCODONE HYDROCHLORIDE 10 MG/1
10 TABLET ORAL EVERY 4 HOURS PRN
Status: DISCONTINUED | OUTPATIENT
Start: 2020-10-16 | End: 2020-10-17 | Stop reason: HOSPADM

## 2020-10-16 RX ORDER — PROPOFOL 10 MG/ML
INJECTION, EMULSION INTRAVENOUS CONTINUOUS PRN
Status: DISCONTINUED | OUTPATIENT
Start: 2020-10-16 | End: 2020-10-16

## 2020-10-16 RX ORDER — LIDOCAINE 40 MG/G
CREAM TOPICAL
Status: DISCONTINUED | OUTPATIENT
Start: 2020-10-16 | End: 2020-10-17 | Stop reason: HOSPADM

## 2020-10-16 RX ORDER — CEFAZOLIN SODIUM 2 G/100ML
2 INJECTION, SOLUTION INTRAVENOUS
Status: COMPLETED | OUTPATIENT
Start: 2020-10-16 | End: 2020-10-16

## 2020-10-16 RX ORDER — POLYETHYLENE GLYCOL 3350 17 G/17G
1 POWDER, FOR SOLUTION ORAL DAILY
Qty: 7 PACKET | Refills: 0 | Status: SHIPPED | OUTPATIENT
Start: 2020-10-16 | End: 2020-11-12

## 2020-10-16 RX ORDER — ASPIRIN 81 MG/1
162 TABLET ORAL DAILY
Qty: 60 TABLET | Refills: 0 | Status: SHIPPED | OUTPATIENT
Start: 2020-10-16

## 2020-10-16 RX ORDER — EPHEDRINE SULFATE 50 MG/ML
INJECTION, SOLUTION INTRAVENOUS PRN
Status: DISCONTINUED | OUTPATIENT
Start: 2020-10-16 | End: 2020-10-16

## 2020-10-16 RX ORDER — ESCITALOPRAM OXALATE 10 MG/1
10 TABLET ORAL EVERY MORNING
Status: DISCONTINUED | OUTPATIENT
Start: 2020-10-17 | End: 2020-10-17 | Stop reason: HOSPADM

## 2020-10-16 RX ORDER — OXYCODONE AND ACETAMINOPHEN 5; 325 MG/1; MG/1
1-2 TABLET ORAL EVERY 4 HOURS PRN
Qty: 26 TABLET | Refills: 0 | Status: CANCELLED | OUTPATIENT
Start: 2020-10-16

## 2020-10-16 RX ORDER — OXYCODONE HYDROCHLORIDE 5 MG/1
5 TABLET ORAL EVERY 4 HOURS PRN
Status: DISCONTINUED | OUTPATIENT
Start: 2020-10-16 | End: 2020-10-17 | Stop reason: HOSPADM

## 2020-10-16 RX ORDER — NALOXONE HYDROCHLORIDE 0.4 MG/ML
.1-.4 INJECTION, SOLUTION INTRAMUSCULAR; INTRAVENOUS; SUBCUTANEOUS
Status: DISCONTINUED | OUTPATIENT
Start: 2020-10-16 | End: 2020-10-16 | Stop reason: HOSPADM

## 2020-10-16 RX ORDER — HYDROMORPHONE HYDROCHLORIDE 1 MG/ML
0.2 INJECTION, SOLUTION INTRAMUSCULAR; INTRAVENOUS; SUBCUTANEOUS
Status: DISCONTINUED | OUTPATIENT
Start: 2020-10-16 | End: 2020-10-17 | Stop reason: HOSPADM

## 2020-10-16 RX ORDER — KETOROLAC TROMETHAMINE 30 MG/ML
INJECTION, SOLUTION INTRAMUSCULAR; INTRAVENOUS PRN
Status: DISCONTINUED | OUTPATIENT
Start: 2020-10-16 | End: 2020-10-16

## 2020-10-16 RX ORDER — ACETAMINOPHEN 325 MG/1
650 TABLET ORAL EVERY 4 HOURS PRN
Status: DISCONTINUED | OUTPATIENT
Start: 2020-10-19 | End: 2020-10-17 | Stop reason: HOSPADM

## 2020-10-16 RX ORDER — ONDANSETRON 2 MG/ML
4 INJECTION INTRAMUSCULAR; INTRAVENOUS EVERY 6 HOURS PRN
Status: DISCONTINUED | OUTPATIENT
Start: 2020-10-16 | End: 2020-10-17 | Stop reason: HOSPADM

## 2020-10-16 RX ORDER — ACETAMINOPHEN 325 MG/1
650 TABLET ORAL EVERY 4 HOURS
Qty: 100 TABLET | Refills: 0 | Status: SHIPPED | OUTPATIENT
Start: 2020-10-16 | End: 2020-10-16

## 2020-10-16 RX ORDER — OXYCODONE HYDROCHLORIDE 5 MG/1
5 TABLET ORAL EVERY 4 HOURS PRN
Status: DISCONTINUED | OUTPATIENT
Start: 2020-10-16 | End: 2020-10-16 | Stop reason: HOSPADM

## 2020-10-16 RX ORDER — SCOLOPAMINE TRANSDERMAL SYSTEM 1 MG/1
1 PATCH, EXTENDED RELEASE TRANSDERMAL ONCE
Status: DISCONTINUED | OUTPATIENT
Start: 2020-10-16 | End: 2020-10-17 | Stop reason: HOSPADM

## 2020-10-16 RX ORDER — ONDANSETRON 4 MG/1
4 TABLET, ORALLY DISINTEGRATING ORAL EVERY 6 HOURS PRN
Status: DISCONTINUED | OUTPATIENT
Start: 2020-10-16 | End: 2020-10-17 | Stop reason: HOSPADM

## 2020-10-16 RX ORDER — CEFAZOLIN SODIUM 1 G/3ML
1 INJECTION, POWDER, FOR SOLUTION INTRAMUSCULAR; INTRAVENOUS SEE ADMIN INSTRUCTIONS
Status: DISCONTINUED | OUTPATIENT
Start: 2020-10-16 | End: 2020-10-16 | Stop reason: HOSPADM

## 2020-10-16 RX ORDER — BUPIVACAINE HYDROCHLORIDE 7.5 MG/ML
INJECTION, SOLUTION INTRASPINAL PRN
Status: DISCONTINUED | OUTPATIENT
Start: 2020-10-16 | End: 2020-10-16

## 2020-10-16 RX ORDER — FENTANYL CITRATE 50 UG/ML
25-50 INJECTION, SOLUTION INTRAMUSCULAR; INTRAVENOUS
Status: DISCONTINUED | OUTPATIENT
Start: 2020-10-16 | End: 2020-10-16

## 2020-10-16 RX ORDER — ONDANSETRON 2 MG/ML
INJECTION INTRAMUSCULAR; INTRAVENOUS PRN
Status: DISCONTINUED | OUTPATIENT
Start: 2020-10-16 | End: 2020-10-16

## 2020-10-16 RX ORDER — IBUPROFEN 600 MG/1
600 TABLET, FILM COATED ORAL
Status: CANCELLED | OUTPATIENT
Start: 2020-10-16

## 2020-10-16 RX ORDER — ACETAMINOPHEN 325 MG/1
975 TABLET ORAL EVERY 8 HOURS
Status: DISCONTINUED | OUTPATIENT
Start: 2020-10-16 | End: 2020-10-17 | Stop reason: HOSPADM

## 2020-10-16 RX ORDER — ONDANSETRON 4 MG/1
4 TABLET, ORALLY DISINTEGRATING ORAL
Status: CANCELLED | OUTPATIENT
Start: 2020-10-16

## 2020-10-16 RX ORDER — KETOROLAC TROMETHAMINE 30 MG/ML
15 INJECTION, SOLUTION INTRAMUSCULAR; INTRAVENOUS EVERY 6 HOURS
Status: DISCONTINUED | OUTPATIENT
Start: 2020-10-16 | End: 2020-10-17 | Stop reason: HOSPADM

## 2020-10-16 RX ORDER — CELECOXIB 200 MG/1
400 CAPSULE ORAL ONCE
Status: COMPLETED | OUTPATIENT
Start: 2020-10-16 | End: 2020-10-16

## 2020-10-16 RX ORDER — GABAPENTIN 100 MG/1
100 CAPSULE ORAL 3 TIMES DAILY PRN
Status: DISCONTINUED | OUTPATIENT
Start: 2020-10-16 | End: 2020-10-17 | Stop reason: HOSPADM

## 2020-10-16 RX ORDER — TRANEXAMIC ACID 650 MG/1
1950 TABLET ORAL ONCE
Status: COMPLETED | OUTPATIENT
Start: 2020-10-16 | End: 2020-10-16

## 2020-10-16 RX ORDER — IBUPROFEN 600 MG/1
600 TABLET, FILM COATED ORAL EVERY 6 HOURS PRN
Qty: 30 TABLET | Refills: 0 | Status: CANCELLED | OUTPATIENT
Start: 2020-10-16

## 2020-10-16 RX ORDER — ONDANSETRON 4 MG/1
4 TABLET, ORALLY DISINTEGRATING ORAL EVERY 6 HOURS PRN
Qty: 10 TABLET | Refills: 0 | Status: SHIPPED | OUTPATIENT
Start: 2020-10-16

## 2020-10-16 RX ORDER — HYDROMORPHONE HYDROCHLORIDE 1 MG/ML
.3-.5 INJECTION, SOLUTION INTRAMUSCULAR; INTRAVENOUS; SUBCUTANEOUS EVERY 10 MIN PRN
Status: DISCONTINUED | OUTPATIENT
Start: 2020-10-16 | End: 2020-10-16 | Stop reason: HOSPADM

## 2020-10-16 RX ORDER — FENTANYL CITRATE 50 UG/ML
25-50 INJECTION, SOLUTION INTRAMUSCULAR; INTRAVENOUS
Status: DISCONTINUED | OUTPATIENT
Start: 2020-10-16 | End: 2020-10-16 | Stop reason: HOSPADM

## 2020-10-16 RX ORDER — DIPHENHYDRAMINE HCL 25 MG
25 CAPSULE ORAL EVERY 6 HOURS PRN
Status: DISCONTINUED | OUTPATIENT
Start: 2020-10-16 | End: 2020-10-17 | Stop reason: HOSPADM

## 2020-10-16 RX ORDER — BISACODYL 10 MG
10 SUPPOSITORY, RECTAL RECTAL DAILY PRN
Status: DISCONTINUED | OUTPATIENT
Start: 2020-10-16 | End: 2020-10-17 | Stop reason: HOSPADM

## 2020-10-16 RX ORDER — AMOXICILLIN 250 MG
1 CAPSULE ORAL 2 TIMES DAILY
Status: DISCONTINUED | OUTPATIENT
Start: 2020-10-16 | End: 2020-10-17 | Stop reason: HOSPADM

## 2020-10-16 RX ORDER — ASPIRIN 81 MG/1
162 TABLET ORAL DAILY
Status: DISCONTINUED | OUTPATIENT
Start: 2020-10-17 | End: 2020-10-17 | Stop reason: HOSPADM

## 2020-10-16 RX ORDER — GABAPENTIN 100 MG/1
100 CAPSULE ORAL 3 TIMES DAILY PRN
Qty: 10 CAPSULE | Refills: 0 | Status: SHIPPED | OUTPATIENT
Start: 2020-10-16

## 2020-10-16 RX ORDER — SODIUM CHLORIDE, SODIUM LACTATE, POTASSIUM CHLORIDE, CALCIUM CHLORIDE 600; 310; 30; 20 MG/100ML; MG/100ML; MG/100ML; MG/100ML
INJECTION, SOLUTION INTRAVENOUS CONTINUOUS
Status: DISCONTINUED | OUTPATIENT
Start: 2020-10-16 | End: 2020-10-17 | Stop reason: HOSPADM

## 2020-10-16 RX ORDER — BUPIVACAINE HYDROCHLORIDE AND EPINEPHRINE 2.5; 5 MG/ML; UG/ML
INJECTION, SOLUTION INFILTRATION; PERINEURAL PRN
Status: DISCONTINUED | OUTPATIENT
Start: 2020-10-16 | End: 2020-10-16

## 2020-10-16 RX ORDER — BUPIVACAINE HYDROCHLORIDE AND EPINEPHRINE 5; 5 MG/ML; UG/ML
INJECTION, SOLUTION PERINEURAL PRN
Status: DISCONTINUED | OUTPATIENT
Start: 2020-10-16 | End: 2020-10-16 | Stop reason: HOSPADM

## 2020-10-16 RX ORDER — ACETAMINOPHEN 325 MG/1
975 TABLET ORAL ONCE
Status: DISCONTINUED | OUTPATIENT
Start: 2020-10-16 | End: 2020-10-16

## 2020-10-16 RX ORDER — ACETAMINOPHEN 325 MG/1
975 TABLET ORAL ONCE
Status: COMPLETED | OUTPATIENT
Start: 2020-10-16 | End: 2020-10-16

## 2020-10-16 RX ORDER — OXYCODONE HYDROCHLORIDE 5 MG/1
10 TABLET ORAL
Status: CANCELLED | OUTPATIENT
Start: 2020-10-16

## 2020-10-16 RX ORDER — HYDROMORPHONE HYDROCHLORIDE 1 MG/ML
0.4 INJECTION, SOLUTION INTRAMUSCULAR; INTRAVENOUS; SUBCUTANEOUS
Status: DISCONTINUED | OUTPATIENT
Start: 2020-10-16 | End: 2020-10-17 | Stop reason: HOSPADM

## 2020-10-16 RX ORDER — OXYCODONE HYDROCHLORIDE 5 MG/1
5-10 TABLET ORAL EVERY 4 HOURS PRN
Qty: 30 TABLET | Refills: 0 | Status: SHIPPED | OUTPATIENT
Start: 2020-10-16

## 2020-10-16 RX ORDER — ONDANSETRON 2 MG/ML
4 INJECTION INTRAMUSCULAR; INTRAVENOUS EVERY 30 MIN PRN
Status: DISCONTINUED | OUTPATIENT
Start: 2020-10-16 | End: 2020-10-16 | Stop reason: HOSPADM

## 2020-10-16 RX ORDER — CEFAZOLIN SODIUM 1 G/3ML
1 INJECTION, POWDER, FOR SOLUTION INTRAMUSCULAR; INTRAVENOUS EVERY 8 HOURS
Status: COMPLETED | OUTPATIENT
Start: 2020-10-16 | End: 2020-10-17

## 2020-10-16 RX ORDER — HYDROXYZINE HYDROCHLORIDE 25 MG/1
25 TABLET, FILM COATED ORAL
Status: CANCELLED | OUTPATIENT
Start: 2020-10-16

## 2020-10-16 RX ORDER — FENTANYL CITRATE 50 UG/ML
INJECTION, SOLUTION INTRAMUSCULAR; INTRAVENOUS PRN
Status: DISCONTINUED | OUTPATIENT
Start: 2020-10-16 | End: 2020-10-16

## 2020-10-16 RX ORDER — AMOXICILLIN 250 MG
1-2 CAPSULE ORAL 2 TIMES DAILY
Qty: 30 TABLET | Refills: 0 | Status: SHIPPED | OUTPATIENT
Start: 2020-10-16

## 2020-10-16 RX ORDER — ONDANSETRON 4 MG/1
4 TABLET, ORALLY DISINTEGRATING ORAL EVERY 30 MIN PRN
Status: DISCONTINUED | OUTPATIENT
Start: 2020-10-16 | End: 2020-10-16 | Stop reason: HOSPADM

## 2020-10-16 RX ORDER — ACETAMINOPHEN 325 MG/1
650 TABLET ORAL EVERY 4 HOURS
Qty: 100 TABLET | Refills: 0 | Status: SHIPPED | OUTPATIENT
Start: 2020-10-16

## 2020-10-16 RX ORDER — MAGNESIUM HYDROXIDE 1200 MG/15ML
LIQUID ORAL PRN
Status: DISCONTINUED | OUTPATIENT
Start: 2020-10-16 | End: 2020-10-16 | Stop reason: HOSPADM

## 2020-10-16 RX ORDER — OXYCODONE HYDROCHLORIDE 5 MG/1
5-10 TABLET ORAL EVERY 4 HOURS PRN
Qty: 30 TABLET | Refills: 0 | Status: SHIPPED | OUTPATIENT
Start: 2020-10-16 | End: 2020-10-16

## 2020-10-16 RX ORDER — PROCHLORPERAZINE MALEATE 10 MG
10 TABLET ORAL EVERY 6 HOURS PRN
Status: DISCONTINUED | OUTPATIENT
Start: 2020-10-16 | End: 2020-10-17 | Stop reason: HOSPADM

## 2020-10-16 RX ORDER — MEPERIDINE HYDROCHLORIDE 25 MG/ML
12.5 INJECTION INTRAMUSCULAR; INTRAVENOUS; SUBCUTANEOUS
Status: DISCONTINUED | OUTPATIENT
Start: 2020-10-16 | End: 2020-10-16 | Stop reason: HOSPADM

## 2020-10-16 RX ORDER — SODIUM CHLORIDE, SODIUM LACTATE, POTASSIUM CHLORIDE, CALCIUM CHLORIDE 600; 310; 30; 20 MG/100ML; MG/100ML; MG/100ML; MG/100ML
INJECTION, SOLUTION INTRAVENOUS CONTINUOUS PRN
Status: DISCONTINUED | OUTPATIENT
Start: 2020-10-16 | End: 2020-10-16

## 2020-10-16 RX ORDER — NALOXONE HYDROCHLORIDE 0.4 MG/ML
.1-.4 INJECTION, SOLUTION INTRAMUSCULAR; INTRAVENOUS; SUBCUTANEOUS
Status: DISCONTINUED | OUTPATIENT
Start: 2020-10-16 | End: 2020-10-17 | Stop reason: HOSPADM

## 2020-10-16 RX ORDER — DOCUSATE SODIUM 100 MG/1
100 CAPSULE, LIQUID FILLED ORAL 2 TIMES DAILY
Status: DISCONTINUED | OUTPATIENT
Start: 2020-10-16 | End: 2020-10-17 | Stop reason: HOSPADM

## 2020-10-16 RX ORDER — PROPOFOL 10 MG/ML
INJECTION, EMULSION INTRAVENOUS PRN
Status: DISCONTINUED | OUTPATIENT
Start: 2020-10-16 | End: 2020-10-16

## 2020-10-16 RX ORDER — GABAPENTIN 300 MG/1
300 CAPSULE ORAL ONCE
Status: COMPLETED | OUTPATIENT
Start: 2020-10-16 | End: 2020-10-16

## 2020-10-16 RX ORDER — DEXAMETHASONE SODIUM PHOSPHATE 4 MG/ML
INJECTION, SOLUTION INTRA-ARTICULAR; INTRALESIONAL; INTRAMUSCULAR; INTRAVENOUS; SOFT TISSUE PRN
Status: DISCONTINUED | OUTPATIENT
Start: 2020-10-16 | End: 2020-10-16

## 2020-10-16 RX ORDER — SODIUM CHLORIDE, SODIUM LACTATE, POTASSIUM CHLORIDE, CALCIUM CHLORIDE 600; 310; 30; 20 MG/100ML; MG/100ML; MG/100ML; MG/100ML
INJECTION, SOLUTION INTRAVENOUS CONTINUOUS
Status: DISCONTINUED | OUTPATIENT
Start: 2020-10-16 | End: 2020-10-16 | Stop reason: HOSPADM

## 2020-10-16 RX ORDER — POLYETHYLENE GLYCOL 3350 17 G/17G
17 POWDER, FOR SOLUTION ORAL DAILY
Status: DISCONTINUED | OUTPATIENT
Start: 2020-10-17 | End: 2020-10-17 | Stop reason: HOSPADM

## 2020-10-16 RX ORDER — FLUMAZENIL 0.1 MG/ML
0.2 INJECTION, SOLUTION INTRAVENOUS
Status: DISCONTINUED | OUTPATIENT
Start: 2020-10-16 | End: 2020-10-16 | Stop reason: HOSPADM

## 2020-10-16 RX ADMIN — FENTANYL CITRATE 50 MCG: 50 INJECTION, SOLUTION INTRAMUSCULAR; INTRAVENOUS at 09:55

## 2020-10-16 RX ADMIN — PROPOFOL 200 MG: 10 INJECTION, EMULSION INTRAVENOUS at 12:22

## 2020-10-16 RX ADMIN — KETOROLAC TROMETHAMINE 30 MG: 30 INJECTION, SOLUTION INTRAMUSCULAR at 13:05

## 2020-10-16 RX ADMIN — KETOROLAC TROMETHAMINE 15 MG: 30 INJECTION, SOLUTION INTRAMUSCULAR; INTRAVENOUS at 22:13

## 2020-10-16 RX ADMIN — OXYCODONE HYDROCHLORIDE 5 MG: 5 TABLET ORAL at 14:21

## 2020-10-16 RX ADMIN — TRANEXAMIC ACID 1950 MG: 650 TABLET ORAL at 09:01

## 2020-10-16 RX ADMIN — HYDROMORPHONE HYDROCHLORIDE 0.5 MG: 1 INJECTION, SOLUTION INTRAMUSCULAR; INTRAVENOUS; SUBCUTANEOUS at 14:42

## 2020-10-16 RX ADMIN — MIDAZOLAM 1 MG: 1 INJECTION INTRAMUSCULAR; INTRAVENOUS at 11:36

## 2020-10-16 RX ADMIN — PROPOFOL 40 MG: 10 INJECTION, EMULSION INTRAVENOUS at 11:13

## 2020-10-16 RX ADMIN — EPHEDRINE SULFATE 5 MG: 50 INJECTION, SOLUTION INTRAVENOUS at 11:54

## 2020-10-16 RX ADMIN — PROCHLORPERAZINE EDISYLATE 5 MG: 5 INJECTION INTRAMUSCULAR; INTRAVENOUS at 15:12

## 2020-10-16 RX ADMIN — FENTANYL CITRATE 50 MCG: 50 INJECTION, SOLUTION INTRAMUSCULAR; INTRAVENOUS at 11:41

## 2020-10-16 RX ADMIN — ONDANSETRON 4 MG: 2 INJECTION INTRAMUSCULAR; INTRAVENOUS at 18:45

## 2020-10-16 RX ADMIN — PROPOFOL 30 MG: 10 INJECTION, EMULSION INTRAVENOUS at 12:07

## 2020-10-16 RX ADMIN — MIDAZOLAM 1 MG: 1 INJECTION INTRAMUSCULAR; INTRAVENOUS at 09:56

## 2020-10-16 RX ADMIN — MIDAZOLAM 1 MG: 1 INJECTION INTRAMUSCULAR; INTRAVENOUS at 10:39

## 2020-10-16 RX ADMIN — BUPIVACAINE HYDROCHLORIDE IN DEXTROSE 13.5 MG: 7.5 INJECTION, SOLUTION SUBARACHNOID at 10:41

## 2020-10-16 RX ADMIN — DOCUSATE SODIUM 50 MG AND SENNOSIDES 8.6 MG 1 TABLET: 8.6; 5 TABLET, FILM COATED ORAL at 21:26

## 2020-10-16 RX ADMIN — HYDROMORPHONE HYDROCHLORIDE 0.5 MG: 1 INJECTION, SOLUTION INTRAMUSCULAR; INTRAVENOUS; SUBCUTANEOUS at 14:17

## 2020-10-16 RX ADMIN — SCOPOLAMINE 1 PATCH: 1 PATCH TRANSDERMAL at 21:26

## 2020-10-16 RX ADMIN — ACETAMINOPHEN 975 MG: 325 TABLET, FILM COATED ORAL at 16:58

## 2020-10-16 RX ADMIN — FENTANYL CITRATE 50 MCG: 50 INJECTION, SOLUTION INTRAMUSCULAR; INTRAVENOUS at 12:33

## 2020-10-16 RX ADMIN — CEFAZOLIN 2 G: 10 INJECTION, POWDER, FOR SOLUTION INTRAVENOUS at 10:46

## 2020-10-16 RX ADMIN — PROPOFOL 100 MCG/KG/MIN: 10 INJECTION, EMULSION INTRAVENOUS at 10:39

## 2020-10-16 RX ADMIN — SODIUM CHLORIDE, POTASSIUM CHLORIDE, SODIUM LACTATE AND CALCIUM CHLORIDE: 600; 310; 30; 20 INJECTION, SOLUTION INTRAVENOUS at 17:05

## 2020-10-16 RX ADMIN — GABAPENTIN 300 MG: 300 CAPSULE ORAL at 09:01

## 2020-10-16 RX ADMIN — BUPIVACAINE HYDROCHLORIDE AND EPINEPHRINE BITARTRATE 40 ML: 2.5; .005 INJECTION, SOLUTION INFILTRATION; PERINEURAL at 09:20

## 2020-10-16 RX ADMIN — MIDAZOLAM 2 MG: 1 INJECTION INTRAMUSCULAR; INTRAVENOUS at 12:24

## 2020-10-16 RX ADMIN — ONDANSETRON 4 MG: 2 INJECTION INTRAMUSCULAR; INTRAVENOUS at 13:02

## 2020-10-16 RX ADMIN — FENTANYL CITRATE 50 MCG: 50 INJECTION, SOLUTION INTRAMUSCULAR; INTRAVENOUS at 11:39

## 2020-10-16 RX ADMIN — SODIUM CHLORIDE, POTASSIUM CHLORIDE, SODIUM LACTATE AND CALCIUM CHLORIDE: 600; 310; 30; 20 INJECTION, SOLUTION INTRAVENOUS at 10:34

## 2020-10-16 RX ADMIN — ACETAMINOPHEN 975 MG: 325 TABLET, FILM COATED ORAL at 09:00

## 2020-10-16 RX ADMIN — CEFAZOLIN 1 G: 10 INJECTION, POWDER, FOR SOLUTION INTRAVENOUS at 12:30

## 2020-10-16 RX ADMIN — PROPOFOL 30 MG: 10 INJECTION, EMULSION INTRAVENOUS at 11:43

## 2020-10-16 RX ADMIN — EPHEDRINE SULFATE 10 MG: 50 INJECTION, SOLUTION INTRAVENOUS at 11:58

## 2020-10-16 RX ADMIN — DEXAMETHASONE SODIUM PHOSPHATE 8 MG: 4 INJECTION, SOLUTION INTRAMUSCULAR; INTRAVENOUS at 11:43

## 2020-10-16 RX ADMIN — CELECOXIB 400 MG: 200 CAPSULE ORAL at 09:00

## 2020-10-16 RX ADMIN — HYDROMORPHONE HYDROCHLORIDE 0.5 MG: 1 INJECTION, SOLUTION INTRAMUSCULAR; INTRAVENOUS; SUBCUTANEOUS at 13:18

## 2020-10-16 RX ADMIN — DOCUSATE SODIUM 100 MG: 100 CAPSULE, LIQUID FILLED ORAL at 21:26

## 2020-10-16 RX ADMIN — SODIUM CHLORIDE, POTASSIUM CHLORIDE, SODIUM LACTATE AND CALCIUM CHLORIDE: 600; 310; 30; 20 INJECTION, SOLUTION INTRAVENOUS at 13:30

## 2020-10-16 RX ADMIN — CEFAZOLIN 1 G: 1 INJECTION, POWDER, FOR SOLUTION INTRAMUSCULAR; INTRAVENOUS at 21:21

## 2020-10-16 ASSESSMENT — MIFFLIN-ST. JEOR: SCORE: 1297.63

## 2020-10-16 NOTE — CONSULTS
[facility]   Consult Note - Hospitalist Service     Date of Admission:  10/16/2020  Consult Requested by: Ortho   Reason for Consult: Medical co-management    Assessment & Plan   Barby Jensen is a 60 year old female admitted on 10/16/2020. POD # 0 s/p Total Right Knee Arthroplasty. Internal medicine was consulted for medical co-management. She has a PMHx significant for right knee OA and anxiety disorder.      Principal Problem:      #Arthritis of right knee    #Status post knee surgery  --Ortho following the patient and managing post op care.   --Ortho managing gaurav-op ABX, DVT prophylaxis and pain management.   --She needs bowel regimen.   --Gentle hydration.   --Check new HGB and BMP tomorrow morning.   --She was having significant nausea after surgery, she received Compazine x 1 and Zofran x 1 without significant improvement, she vomited x 1. Pt stated that she got scopolamine patch in the past with good results. One scopolamine patch was ordered.   --PT / OT.   --She is hemodynamically stable after surgery.   --Disposition per Ortho.   --Continue following the patient.     Active Problems:      #DEAN (generalized anxiety disorder)  --Continue on PTA Lexapro.       The patient's care was discussed with the Patient.    Dario Bob MD  [facility]   Hospitalist Service   Pager: [phone]      ______________________________________________________________________    Chief Complaint   Knee pain    History is obtained from the patient    History of Present Illness   Barby Jensen is a 60 year old female admitted on 10/16/2020. POD # 0 s/p Bilateral Knee Unicompartment Arthroplasty. Internal medicine was consulted for medical co-management. She has a PMHx significant for right knee OA and anxiety disorder. She was hemodynamically stable after surgery. Pain was well controlled but she had persisted nausea and  vomit. Patient was alert, oriented and pleasant. No chest pain, palpitations, shortness of breath, cough, fever, chills, abdominal pain, dysuria or diarrhea.        Review of Systems   The 10 point Review of Systems is negative other than noted in the HPI or here. Other ROS negative.     Past Medical History    I have reviewed this patient's medical history and updated it with pertinent information if needed.   Past Medical History:   Diagnosis Date     Acne rosacea      ASCUS on Pap smear 08/2006     DEAN (generalized anxiety disorder)      Reactive airway disease        Past Surgical History   I have reviewed this patient's surgical history and updated it with pertinent information if needed.  Past Surgical History:   Procedure Laterality Date     ARTHROSCOPY KNEE BILATERAL Bilateral 09/04/2015    Procedure: ARTHROSCOPY KNEE BILATERAL;  Surgeon: Tracie Stewart MD;  Location: [facility]     COLONOSCOPY       EXCISE MASS UPPER EXTREMITY  10/2009    right forearm lipoma     INJECT EPIDURAL CERVICAL / THORACIC SINGLE N/A 03/07/2018    Procedure: INJECT EPIDURAL CERVICAL / THORACIC SINGLE;  Cervical Epidural Steroid Injectioncervical 7, throasic 1;  Surgeon: Levi Conteh MD;  Location: [facility] OR     KNEE SURGERY  09/2015    Bilateral meniscectomies       Social History   I have reviewed this patient's social history and updated it with pertinent information if needed.  Social History     Tobacco Use     Smoking status: Never Smoker     Smokeless tobacco: Never Used   Substance Use Topics     Alcohol use: Yes     Comment: 1-2 glasses wine per week     Drug use: No       Family History   I have reviewed this patient's family history and updated it with pertinent information if needed.   Family History   Problem Relation Age of Onset     Gastrointestinal Disease Mother      Hearing Loss Mother      Hypertension Mother      Heart Failure Mother      Osteopenia Mother      Atrial fibrillation Mother         pacemaker      Pacemaker Mother      Hearing Loss Father      Prostate Cancer Father      Eye Disorder Father         macular degeneration     Osteopenia Father         after tx for prostate cancer     Osteoarthritis Father 72        knee replacement     Macular Degeneration Father      Depression Paternal Grandmother      Osteoarthritis Brother 62        knee replacement     Atrial fibrillation Brother        Medications   Current Facility-Administered Medications   Medication     [START ON 10/19/2020] acetaminophen (TYLENOL) tablet 650 mg     acetaminophen (TYLENOL) tablet 975 mg     [START ON 10/17/2020] aspirin EC tablet 162 mg     benzocaine-menthol (CEPACOL) 15-3.6 MG lozenge 1 lozenge     bisacodyl (DULCOLAX) Suppository 10 mg     ceFAZolin (ANCEF) 1 g vial to attach to  ml bag for ADULT or 50 ml bag for PEDS     diphenhydrAMINE (BENADRYL) capsule 25 mg     docusate sodium (COLACE) capsule 100 mg     [START ON 10/17/2020] escitalopram (LEXAPRO) tablet 10 mg     gabapentin (NEURONTIN) capsule 100 mg     HYDROmorphone (PF) (DILAUDID) injection 0.2 mg    Or     HYDROmorphone (PF) (DILAUDID) injection 0.4 mg     ketorolac (TORADOL) injection 15 mg     lactated ringers infusion     lidocaine (LMX4) cream     lidocaine 1 % 0.1-1 mL     magnesium hydroxide (MILK OF MAGNESIA) suspension 30 mL     naloxone (NARCAN) injection 0.1-0.4 mg     ondansetron (ZOFRAN-ODT) ODT tab 4 mg    Or     ondansetron (ZOFRAN) injection 4 mg     oxyCODONE (ROXICODONE) tablet 5 mg    Or     oxyCODONE IR (ROXICODONE) tablet 10 mg     [START ON 10/17/2020] polyethylene glycol (MIRALAX) Packet 17 g     prochlorperazine (COMPAZINE) injection 10 mg    Or     prochlorperazine (COMPAZINE) tablet 10 mg     scopolamine (TRANSDERM) 72 hr patch 1 patch    And     scopolamine (TRANSDERM-SCOP) Patch in Place     senna-docusate (SENOKOT-S/PERICOLACE) 8.6-50 MG per tablet 1 tablet     sodium chloride (PF) 0.9% PF flush 3 mL     sodium chloride (PF) 0.9% PF flush  3 mL     sodium phosphate (FLEET ENEMA) 1 enema       Allergies   No Known Allergies    Physical Exam   Vital Signs: Temp: 96  F (35.6  C) Temp src: Oral BP: 102/61 Pulse: 63   Resp: 9 SpO2: 95 % O2 Device: Nasal cannula Oxygen Delivery: 2 LPM  Weight: 154 lbs 5.15 oz    General Appearance: Alert, no acute distress.   Eyes: Pupils equal and reactive to light.  HEENT: Oral mucosa moist.   Respiratory: Normal respiratory effort, clear lungs.   Cardiovascular: RRR, no murmur, no peripheral edema.   GI: Non-distended, soft, + BS, no tenderness to palpation.   Lymph/Hematologic: No lymphadenopathy.   Genitourinary: Deferred.   Skin: No rash.   Musculoskeletal: Both legs with surgical dressings.   Neurologic: Alert, oriented x 3, no focal deficits.   Psychiatric: Mood stable.     Data   I personally reviewed no EKG today.  Results for orders placed or performed during the hospital encounter of 10/16/20 (from the past 24 hour(s))   POC US Guidance Needle Placement    Impression    Bilateral adductor canal block   Glucose   Result Value Ref Range    Glucose 98 70 - 99 mg/dL   ABO/Rh type and screen   Result Value Ref Range    ABO O     RH(D) Pos     Antibody Screen Neg     Test Valid Only At          [facility]    Specimen Expires 10/19/2020    XR Knee Port Right 1/2 Views    Narrative    EXAM: XR KNEE PORT RT 1/2 VW  10/16/2020 2:43 PM      HISTORY: Post-Op Total Knee    COMPARISON: 5 20 1/20 19    FINDINGS: Portable postoperative AP and crosstable lateral views of  the right knee.    New medial unicompartmental arthroplasty. Components appear well  seated. Postsurgical subcutaneous gas. Foci of mineralization  projecting over the notch on the frontal view, possibly postsurgical.       Impression    IMPRESSION: New medial unicompartmental arthroplasty.         DEISY BEGUM MD (Joe)   XR Knee Port Left 1/2 Views    Narrative    EXAM: XR KNEE PORT LT 1/2 VW  10/16/2020  2:43 PM      HISTORY: Post-Op Total Knee    COMPARISON: 7/7/2020    FINDINGS: Portable postoperative AP and crosstable lateral views of  the left knee.    New medial unicompartmental arthroplasty. Components appear well  seated. Postsurgical subcutaneous gas. Foci of mineralization  posterior to the tibia, likely postsurgical.       Impression    IMPRESSION: New medial unicompartmental arthroplasty.         DEISY BEGUM MD (Joe)     Most Recent 3 CBC's:  Recent Labs   Lab Test 10/01/20  1600 05/09/19  1438 09/01/15  1602 09/20/13  0908   WBC 6.6  --   --  4.8   HGB 13.9 14.6 13.8 14.1   MCV 95 92.7  --  91     --   --  188     Most Recent 3 BMP's:  Recent Labs   Lab Test 10/16/20  0845 10/01/20  1600 09/12/16  1342 09/01/15  1602 09/20/13  0908   NA  --  138  --  139 140   POTASSIUM  --  4.1  --  4.5 4.0   CHLORIDE  --  109  --  104 104   CO2  --  22  --  30 26   BUN  --  18  --  17 19   CR  --  0.85  --  0.85 0.75   ANIONGAP  --  7  --  5 10   GISELA  --  8.7  --  9.0 9.4   GLC 98 74 83 84 95

## 2020-10-16 NOTE — ANESTHESIA CARE TRANSFER NOTE
Patient: Barby Jensen    Procedure(s):  Bilateral Knee Unicompartment Arthroplasty    Diagnosis: Arthritis of right knee [M17.11]  Diagnosis Additional Information: No value filed.    Anesthesia Type:   MAC, Spinal     Note:  Airway :Face Mask          Vitals: (Last set prior to Anesthesia Care Transfer)    CRNA VITALS  10/16/2020 1306 - 10/16/2020 1344      10/16/2020             Resp Rate (observed):  (!) 2                Electronically Signed By: MAICOL Gallegos CRNA  October 16, 2020  1:44 PM

## 2020-10-16 NOTE — ANESTHESIA PROCEDURE NOTES
Airway   Date/Time: 10/16/2020 12:11 PM   Patient location during procedure: OR    Staff -   CRNA: Mechelle Bella APRN CRNA  Performed By: CRNA    Consent for Airway   Urgency: elective    Indications and Patient Condition  Indications for airway management: gaurav-procedural  Induction type:intravenousMask difficulty assessment: 1 - vent by mask    Final Airway Details  Final airway type: supraglottic airway    Endotracheal Airway Details   Secured with: silk tape    Post intubation assessment   Placement verified by: capnometry, equal breath sounds and chest rise   Number of attempts at approach: 1  Secured with:silk tape  Ease of procedure: easy  Dentition: Intact and Unchanged

## 2020-10-16 NOTE — ANESTHESIA POSTPROCEDURE EVALUATION
Anesthesia POST Procedure Evaluation    Patient: Barby Jensen   MRN:     [id number] Gender:   female   Age:    60 year old :      1960        Preoperative Diagnosis: Arthritis of right knee [M17.11]   Procedure(s):  Bilateral Knee Unicompartment Arthroplasty   Postop Comments: No value filed.     Anesthesia Type: MAC, Spinal       Disposition: Admission   Postop Pain Control: Uneventful            Sign Out: Well controlled pain   PONV: No   Neuro/Psych: Uneventful            Sign Out: Acceptable/Baseline neuro status   Airway/Respiratory: Uneventful            Sign Out: Acceptable/Baseline resp. status   CV/Hemodynamics: Uneventful            Sign Out: Acceptable CV status   Other NRE: NONE   DID A NON-ROUTINE EVENT OCCUR? No         Last Anesthesia Record Vitals:  CRNA VITALS  10/16/2020 1306 - 10/16/2020 1406      10/16/2020             Resp Rate (observed):  (!) 2          Last PACU Vitals:  Vitals Value Taken Time   /80 10/16/20 1430   Temp 36.8  C (98.2  F) 10/16/20 1339   Pulse 71 10/16/20 1443   Resp 29 10/16/20 1443   SpO2 99 % 10/16/20 1443   Temp src     NIBP     Pulse     SpO2     Resp     Temp     Ht Rate     Temp 2     Vitals shown include unvalidated device data.      Electronically Signed By: Josiah Alexander MD, 2020, 2:44 PM

## 2020-10-16 NOTE — OR NURSING
PACU to Inpatient Nursing Handoff    Patient Barby Jensen is a 60 year old female who speaks English.   Procedure Procedure(s):  Bilateral Knee Unicompartment Arthroplasty   Surgeon(s) Primary: Doug Senior MD  Assisting: Magno Chavarria PA-C     No Known Allergies    Isolation  [unfilled]     Past Medical History   has a past medical history of Acne rosacea, ASCUS on Pap smear (08/2006), DEAN (generalized anxiety disorder), and Reactive airway disease.    Anesthesia General   Dermatome Level     Preop Meds acetaminophen (Tylenol) - time given: 0500  celecoxib (Celebrex) - time given: 0900  gabapentin (Neurontin) - time given: 0900   Nerve block Adductor canal.  Location:bilateral. Med:Exparel (liposomal bupivacaine). Time given: 0930  Saphenous block and spinal block .  Location:bilateral. Med:bupivacaine. Time given: 1000   Intraop Meds dexamethasone (Decadron)  fentanyl (Sublimaze): 150 mcg total  hydromorphone (Dilaudid): 0.5 mg total  ketorolac (Toradol): last given at 1305  ondansetron (Zofran): last given at 1302   Local Meds Yes - Local Cocktail (morphine, ropivacaine, epinephrine, Toradol)   Antibiotics cefazolin (Ancef) - last given at 1230     Pain Patient Currently in Pain: yes  Comfort: tolerable with discomfort   PACU meds  hydromorphone (Dilaudid): 1 mg (total dose) last given at 1442   oxycodone (Roxicodone): 5 mg (total dose) last given at 1421   prochlorperazine (Compazine): 5 mg (total dose) last given at 1515    PCA / epidural No   Capnography  yes     Telemetry ECG Rhythm: Normal sinus rhythm   Inpatient Telemetry Monitor Ordered? No        Labs Glucose Lab Results   Component Value Date    GLC 98 10/16/2020       Hgb Lab Results   Component Value Date    HGB 13.9 10/01/2020       INR No results found for: INR   PACU Imaging Completed     Wound/Incision Incision/Surgical Site 09/04/15 Bilateral Knee (Active)   Number of days: 1869       Incision/Surgical Site 03/07/18 Midline  Cervical spine (Active)   Number of days: 954       Incision/Surgical Site 10/16/20 Right Knee (Active)   Closure Adhesive strip(s) 10/16/20 1258   Dressing Intervention Clean, dry, intact 10/16/20 1430   Number of days: 0       Incision/Surgical Site 10/16/20 Left Knee (Active)   Closure Adhesive strip(s) 10/16/20 1258   Dressing Intervention Clean, dry, intact 10/16/20 1430   Number of days: 0      CMS        Equipment ice pack and continuous passive motion machine (CPM)   Other LDA       IV Access Peripheral IV 10/16/20 Right Hand (Active)   Site Assessment WDL 10/16/20 1339   Line Status Infusing 10/16/20 1339   Phlebitis Scale 0-->no symptoms 10/16/20 1339   Infiltration Scale 0 10/16/20 1339   Infiltration Site Treatment Method  None 10/16/20 1339   Number of days: 0      Blood Products Not applicable  mL   Intake/Output Date 10/16/20 0700 - 10/17/20 0659   Shift 2935-8867 6798-4122 8648-1497 24 Hour Total   INTAKE   P.O. 100   100   I.V. 1100   1100   Shift Total(mL/kg) 1200(17.14)   1200(17.14)   OUTPUT   Blood 100   100   Shift Total(mL/kg) 100(1.43)   100(1.43)   Weight (kg) 70 70 70 70      Drains / [place]     Time of void PreOp Void Prior to Procedure: 0857 (10/16/20 0857)    PostOp      Diapered? No   Bladder Scan Bladder Scan Volume (mL): 148 ml (10/16/20 1430)    mL (10/16/20 1430)  tolerating sips and popsicles     Vitals    B/P: 115/80  T: 98.2  F (36.8  C)    Temp src: Oral  P:  Pulse: 79 (10/16/20 1430)          R: 12  O2:  SpO2: 99 %    O2 Device: None (Room air) (10/16/20 1400)    Oxygen Delivery: 10 LPM (10/16/20 1339)         Family/support present significant other   Patient belongings     Patient transported on bed   DC meds/scripts (obs/outpt) Yes, meds   Inpatient Pain Meds Released? Yes       Special needs/considerations IM consult, very pleasant, CPM on   Tasks needing completion very pleasant, spinal almost worn off, minimal pain, mild nausea       Pranay Torres,  RN  [facility] [id number]

## 2020-10-16 NOTE — ANESTHESIA PROCEDURE NOTES
Peripheral Nerve Block Procedure Note      Staff -   Anesthesiologist:  Josiah Alexander MD  Resident/Fellow: Akhil Woodward MD  Performed By: anesthesiologist and with fellow  Location: Pre-op  Procedure Start/Stop TImes:      10/16/2020 9:20 AM    patient identified, IV checked, site marked, risks and benefits discussed, informed consent, monitors and equipment checked, pre-op evaluation, at physician/surgeon's request and post-op pain management      Correct Patient: Yes      Correct Position: Yes      Correct Site: Yes      Correct Procedure: Yes      Correct Laterality:  Yes    Site Marked:  Yes  Procedure details:     Procedure:  Saphenous and Adductor canal    Diagnosis:  Postoperative pain relief    Laterality:  Bilateral    Position:  Supine    Sterile Prep: chloraprep, mask and sterile gloves      Local skin infiltration:  None    Needle:  Insulated    Needle gauge:  21    Needle length (mm):  110    Ultrasound: Yes      Ultrasound used to identify targeted nerve, plexus, or vascular structure and placed a needle adjacent to it      Permanent Image entered into patiient's record      Abnormal pain on injection: No      Blood Aspirated: No      Paresthesias:  No    Bleeding at site: No      Bolus via:  Needle    Infusion Method:  Single Shot    Complications:  None  Assessment/Narrative:     Injection made incrementally with aspirations every (mL):  5

## 2020-10-16 NOTE — ANESTHESIA PROCEDURE NOTES
Spinal/LP Procedure Note    Spinal Block      Staff -   Anesthesiologist:  Brynn Whitt MD  Performed By: anesthesiologist  Location: pre-op  Procedure Start/Stop Times:      10/16/2020 10:36 AM     10/16/2020 10:41 AM    patient identified, IV checked, site marked, risks and benefits discussed, informed consent, monitors and equipment checked, pre-op evaluation, at physician/surgeon's request and post-op pain management      Correct Patient: Yes      Correct Position: Yes      Correct Site: Yes      Correct Procedure: Yes      Correct Laterality:  Yes    Site Marked:  Yes  Procedure:     Procedure:  Intrathecal    Position:  Sitting    Sterile Prep: chloraprep, mask, sterile gloves and patient draped      Insertion site:  L3-4    Approach:  Midline    Needle Type:  Deepa    Needle gauge (G):  25    Local Skin Infiltration:  1% lidocaine    amount (ml):  2    Introducer used: Yes      Introducer gauge:  20 G    Attempts:  1    Redirects:  0    CSF:  Clear    Paresthesias:  No    Time injected:  10:41  Assessment/Narrative:      Discussed risks of spinal including bleeding, infection, nerve injury, headache, conversion to general anesthesia. Discussed plan for MAC, including risk of aspiration. Discussed risks of backup general anesthesia, including sore throat/hoarse voice, abrasions/damage to lips/tongue/teeth, nausea, rare complications (including medication reactions, cardiac, pulmonary).  1 attempt. CSF clear. Positive CSF aspiration before, during and after intrathecal injection.

## 2020-10-17 ENCOUNTER — APPOINTMENT (OUTPATIENT)
Dept: PHYSICAL THERAPY | Facility: CLINIC | Age: 60
End: 2020-10-17
Attending: ORTHOPAEDIC SURGERY
Payer: COMMERCIAL

## 2020-10-17 VITALS
HEART RATE: 74 BPM | DIASTOLIC BLOOD PRESSURE: 63 MMHG | SYSTOLIC BLOOD PRESSURE: 92 MMHG | RESPIRATION RATE: 13 BRPM | OXYGEN SATURATION: 98 % | BODY MASS INDEX: 24.22 KG/M2 | HEIGHT: 67 IN | WEIGHT: 154.32 LBS | TEMPERATURE: 98 F

## 2020-10-17 LAB
ANION GAP SERPL CALCULATED.3IONS-SCNC: 7 MMOL/L (ref 3–14)
BUN SERPL-MCNC: 13 MG/DL (ref 7–30)
CALCIUM SERPL-MCNC: 8.1 MG/DL (ref 8.5–10.1)
CHLORIDE SERPL-SCNC: 108 MMOL/L (ref 94–109)
CO2 SERPL-SCNC: 25 MMOL/L (ref 20–32)
CREAT SERPL-MCNC: 0.72 MG/DL (ref 0.52–1.04)
GFR SERPL CREATININE-BSD FRML MDRD: >90 ML/MIN/{1.73_M2}
GLUCOSE SERPL-MCNC: 98 MG/DL (ref 70–99)
HGB BLD-MCNC: 12.4 G/DL (ref 11.7–15.7)
POTASSIUM SERPL-SCNC: 3.7 MMOL/L (ref 3.4–5.3)
SODIUM SERPL-SCNC: 140 MMOL/L (ref 133–144)

## 2020-10-17 PROCEDURE — 250N000013 HC RX MED GY IP 250 OP 250 PS 637: Performed by: ORTHOPAEDIC SURGERY

## 2020-10-17 PROCEDURE — 99207 PR CDG-CUT & PASTE-POTENTIAL IMPACT ON LEVEL: CPT | Performed by: INTERNAL MEDICINE

## 2020-10-17 PROCEDURE — 85018 HEMOGLOBIN: CPT | Performed by: ORTHOPAEDIC SURGERY

## 2020-10-17 PROCEDURE — 250N000011 HC RX IP 250 OP 636: Performed by: ORTHOPAEDIC SURGERY

## 2020-10-17 PROCEDURE — 80048 BASIC METABOLIC PNL TOTAL CA: CPT | Performed by: ORTHOPAEDIC SURGERY

## 2020-10-17 PROCEDURE — 999N000111 HC STATISTIC OT IP EVAL DEFER

## 2020-10-17 PROCEDURE — 99214 OFFICE O/P EST MOD 30 MIN: CPT | Performed by: INTERNAL MEDICINE

## 2020-10-17 PROCEDURE — 250N000013 HC RX MED GY IP 250 OP 250 PS 637: Performed by: INTERNAL MEDICINE

## 2020-10-17 PROCEDURE — 97116 GAIT TRAINING THERAPY: CPT | Mod: GP

## 2020-10-17 PROCEDURE — 36415 COLL VENOUS BLD VENIPUNCTURE: CPT | Performed by: ORTHOPAEDIC SURGERY

## 2020-10-17 PROCEDURE — 97161 PT EVAL LOW COMPLEX 20 MIN: CPT | Mod: GP

## 2020-10-17 RX ADMIN — ACETAMINOPHEN 975 MG: 325 TABLET, FILM COATED ORAL at 01:08

## 2020-10-17 RX ADMIN — KETOROLAC TROMETHAMINE 15 MG: 30 INJECTION, SOLUTION INTRAMUSCULAR; INTRAVENOUS at 10:13

## 2020-10-17 RX ADMIN — DOCUSATE SODIUM 100 MG: 100 CAPSULE, LIQUID FILLED ORAL at 08:01

## 2020-10-17 RX ADMIN — ESCITALOPRAM OXALATE 10 MG: 10 TABLET ORAL at 08:02

## 2020-10-17 RX ADMIN — DOCUSATE SODIUM 50 MG AND SENNOSIDES 8.6 MG 1 TABLET: 8.6; 5 TABLET, FILM COATED ORAL at 08:02

## 2020-10-17 RX ADMIN — KETOROLAC TROMETHAMINE 15 MG: 30 INJECTION, SOLUTION INTRAMUSCULAR; INTRAVENOUS at 04:13

## 2020-10-17 RX ADMIN — CEFAZOLIN 1 G: 1 INJECTION, POWDER, FOR SOLUTION INTRAMUSCULAR; INTRAVENOUS at 04:45

## 2020-10-17 RX ADMIN — ASPIRIN 162 MG: 81 TABLET, COATED ORAL at 08:02

## 2020-10-17 RX ADMIN — POLYETHYLENE GLYCOL 3350 17 G: 17 POWDER, FOR SOLUTION ORAL at 08:02

## 2020-10-17 RX ADMIN — ACETAMINOPHEN 975 MG: 325 TABLET, FILM COATED ORAL at 08:00

## 2020-10-17 RX ADMIN — OXYCODONE HYDROCHLORIDE 10 MG: 10 TABLET ORAL at 12:57

## 2020-10-17 RX ADMIN — OXYCODONE HYDROCHLORIDE 10 MG: 10 TABLET ORAL at 07:58

## 2020-10-17 NOTE — PLAN OF CARE
VS: VSS. Baseline low BP's /60 per patient.    O2: 98% on RA. Denies SOB and chest pain. Clear lung sounds bilaterally.    Output: Voids spontaneously without difficulty.    Last BM: 10/15/20 per patient. Passing flatus. Scopolamine patch behind left ear.   Activity: Passed physical therapy this AM. Up independently with walker.    Skin: Intact except bilateral knee incisions.    Pain: Bilateral knee pain r/t surgery. Managed with scheduled Toradol, Tylenol, and prn Oxycodone.    CMS: Intact. Denies numbness and tingling in all extremities.    Dressing: Gauze with Tegaderm on bilateral knees. CDI.    Diet: Regular. Ate 100% of breakfast. No reported nausea.    LDA: PIV removed    Equipment: Walker and CPM   Plan: Will discharge this afternoon to daughter's home where she will stay for 2 weeks while she is recovering.    Additional Info:      Patient vital signs are at baseline: Yes  Patient able to ambulate as they were prior to admission or with assist devices provided by therapies during their stay:  Yes  Patient MUST void prior to discharge:  Yes  Patient able to tolerate oral intake:  Yes  Pain has adequate pain control using Oral analgesics:  Yes

## 2020-10-17 NOTE — PLAN OF CARE
OT: Orders received and acknowledged. Following discussion with patient, no skilled OT needs indicated. Patient with no concerns regarding ADLs. Has DME in place and will have assist as needed. Will complete OT orders.

## 2020-10-17 NOTE — PROGRESS NOTES
"Orthopedic Surgery Progress Note    Subjective / Interval Events:   Patient resting in bed; no acute distress; pain controlled.  Denies chest pain, palpitations, SOB.  Pain well controlled on current regimen.      Discussion had with the patient regarding her discharge planning home for today.  Patient reports she is doing quite well with physical therapy and pain is well controlled at this time.  She is in agreement with this plan, and would like to leave today.    Objective:   Vital Signs Temp (24hrs), Av.7  F (36.5  C), Min:96  F (35.6  C), Max:98.2  F (36.8  C)    BP 92/63 (BP Location: Left arm)   Pulse 74   Temp 98  F (36.7  C) (Oral)   Resp 13   Ht 1.702 m (5' 7\")   Wt 70 kg (154 lb 5.2 oz)   SpO2 98%   BMI 24.17 kg/m        Physical Examination   General: no acute distress  CV: palpable pulses   Resp: Nonlabored breathing  Bilateral LE: dressing in tact; clean and dry      5/5 Iliopsoas, quadricep, tibialis anterior, extensor houses longus, gastrocsoleus.     Sensation to light touch intact in the L2-S1 dermatome distribution.    Left dorsalis pedis pulse palpated to be 2/4.    Tolerates range of motion in all major joints     No significant pain or limitation of range of motion.     Labs (Last 24 hour):   Lab Results   Component Value Date    WBC 6.6 10/01/2020    HGB 12.4 10/17/2020    HCT 41.5 10/01/2020    MCV 95 10/01/2020     10/01/2020    RDW 12.2 10/01/2020     Lab Results   Component Value Date    BUN 13 10/17/2020     10/17/2020    CO2 25 10/17/2020    ANIONGAP 7 10/17/2020       Assessment / Plan:   This is a 60 year old female s/p bilateral uniarthroplasty on 2020 by Dr. Senior    Pain control; continue to wean medications as tolerated  CV: Monitor HR/BP  Resp: Encourage IS  Heme/ID: Monitor Hgb  GI/: diet as tolerated  PT/OT: Cleared for discharge home today  Weightbearing as tolerated bilateral lower extremity  Post-op antibiotics: Ancef complete  DVT ppx: aspirin " 162 mg twice daily    Plan for discharge: discharge home today    Please notify Dr. Elijah Roman of any new questions concerns or complications at [phone].  Thank you    Elijah Roman DO  Adult Joint Reconstruction Fellow  Dept Orthopaedic Surgery, [facility]  [phone] Pager [phone] Office

## 2020-10-17 NOTE — PLAN OF CARE
VS: VSS   O2: > 90% on RA. Denies SOB, cough, and chest pain. Using IS independently. On capno until 1630   Output: Voiding spontaneously, PVR on eves 30ml   Last BM: 10/15/2020. + flatus   Activity: Up w one assist and walker   Skin: Intact ex incisions   Pain: Comfortably manageable w scheduled Tylenol and Toradol   CMS: Intact   Dressing: CDI x2   Diet: Tolerated reg diet w/o nausea   LDA: PIV SL, no drains   Equipment: IV pump, capno, walker   Plan: Continue POC   Additional Info:

## 2020-10-17 NOTE — PROGRESS NOTES
VS: VSS   O2: >90% on room air   Output: Voided after surgery at about 6pm. Bladder scanned for >30mL immediately following   Last BM: Yesterday 10/15 in the morning per pt    Activity: SBA   Up for meals? In bed   Skin: Incisions on bilateral knees    Pain: Pt pain has been managed by scheduled medications. PRN oxy is available but pt hasnt requested   CMS: Slight numbness after surgery. Towards end of shift the pt denied numbness   Dressing: CDI. ACE wraps on.    Diet: regular   LDA: PIV In R arm   Equipment: IV pole, walker, emesis bags. Personal belongings   Plan: Pt is inquiring about discharge. I informed her she needs to meet with PT/OT tomorrow and a further pan will be developed   Additional Info: Pt is a nurse at the [employer].   Pt has had nausea pretty consistently today so she requested a scope patch and it is placed behind her Left eat      Patient vital signs are at baseline: Yes  Patient able to ambulate as they were prior to admission or with assist devices provided by therapies during their stay:  Yes but with a walker due to surgery. Walking very well  Patient MUST void prior to discharge:  Yes  Patient able to tolerate oral intake:  Yes  Pain has adequate pain control using Oral analgesics:  No. Pt receiving IV toradol scheduled

## 2020-10-17 NOTE — PLAN OF CARE
[facility]      OUTPATIENT PHYSICAL THERAPY EVALUATION  PLAN OF TREATMENT FOR OUTPATIENT REHABILITATION  (COMPLETE FOR INITIAL CLAIMS ONLY)  Patient's Last Name, First Name, M.I.  YOB: 1960  Barby Jensen                           Provider's Name  [facility] Medical Record No.  [id number]                               Onset Date:  10/16/20   Start of Care Date:         Type:     _X_PT   ___OT   ___SLP Medical Diagnosis:                           PT Diagnosis:  Impaired ambulation, impaired BLE ROM   Visits from SOC:  1   _________________________________________________________________________________  Plan of Treatment/Functional Goals    Planned Interventions: gait training, home exercise program, patient/family education, ROM (range of motion), stair training, strengthening, transfer training     Goals: See Physical Therapy Goals on Care Plan in [employer] electronic health record.    Therapy Frequency: One time eval and treatment only  Predicted Duration of Therapy Intervention: Once  _________________________________________________________________________________    I CERTIFY THE NEED FOR THESE SERVICES FURNISHED UNDER        THIS PLAN OF TREATMENT AND WHILE UNDER MY CARE     (Physician co-signature of this document indicates review and certification of the therapy plan).                 ,      Referring Physician: Dr. Senior            Initial Assessment        See Physical Therapy evaluation dated   in Epic electronic health record.

## 2020-10-17 NOTE — PROGRESS NOTES
DISCHARGE SUMMARY    Pt discharging to: [facility]aughter[facility] Home  Transportation:   AVS given and discussed: Yes  Medications given: Yes  Belongings returned: Remained with patient   Comments:

## 2020-10-17 NOTE — PHARMACY-ADMISSION MEDICATION HISTORY
Admission medication history interview status for the 10/16/2020 admission is complete. See Epic admission navigator for allergy information, pharmacy, prior to admission medications and immunization status.     Medication history interview sources:  Surescripts & patient    Changes made to PTA medication list (reason)  Added: NA  Deleted: NA  Changed: calcium changed to calcium 500mg/vitamin D 400units    Additional medication history information (including reliability of information, actions taken by pharmacist): high reliability      Prior to Admission medications    Medication Sig Last Dose Taking? Auth Provider   acetaminophen (TYLENOL) 325 MG tablet Take 2 tablets (650 mg) by mouth every 4 hours  Yes Tyson Mercado PA-C   aspirin 81 MG EC tablet Take 2 tablets (162 mg) by mouth daily  Yes Doug Senior MD   calcium carbonate-vitamin D (OS-GISELA) 500-400 MG-UNIT tablet Take 1 tablet by mouth every evening Past Week at Unknown time Yes Unknown, Entered By History   escitalopram (LEXAPRO) 10 MG tablet Take 1 tablet (10 mg) by mouth daily  Patient taking differently: Take 10 mg by mouth every morning  10/15/2020 at 0800 Yes Eryn Caro MD   gabapentin (NEURONTIN) 100 MG capsule Take 1 capsule (100 mg) by mouth 3 times daily as needed (If the patient is reporting neuropathic or nerve pain.)  Yes Tyson Mercado PA-C   Glucosamine-Chondroit-Vit C-Mn (GLUCOSAMINE 1500 COMPLEX PO) Take by mouth every evening  Past Week at Unknown time Yes Reported, Patient   Multiple Vitamins-Minerals (PRESERVISION AREDS) CAPS Take one daily  Patient taking differently: every evening Take one daily Past Week at Unknown time Yes Eryn Caro MD   norethindrone-ethinyl estradiol (FEMHRT 1/5) 1-5 MG-MCG tablet Take 1 tablet by mouth daily  Patient taking differently: Take 1 tablet by mouth every morning  10/15/2020 at 0800 Yes Eryn Caro MD   ondansetron (ZOFRAN-ODT) 4 MG ODT tab Take 1 tablet  (4 mg) by mouth every 6 hours as needed for nausea or vomiting  Yes Tyson Mercado PA-C   oxyCODONE (ROXICODONE) 5 MG tablet Take 1-2 tablets (5-10 mg) by mouth every 4 hours as needed for pain (Moderate to Severe)  Yes Tyson Mercado PA-C   polyethylene glycol (MIRALAX) 17 g packet Take 17 g by mouth daily  Yes Doug Senior MD   senna-docusate (SENOKOT-S/PERICOLACE) 8.6-50 MG tablet Take 1-2 tablets by mouth 2 times daily Take while on oral narcotics to prevent or treat constipation.  Yes Doug Senior MD   sulfacetamide sodium, Acne, (KLARON) 10 % lotion Apply once at night - needs clinic visit  Patient taking differently: Apply topically every morning Apply once at night - needs clinic visit 10/15/2020 at Unknown time Yes Eryn Caro MD   diclofenac (VOLTAREN) 50 MG EC tablet Take 1 tablet (50 mg) by mouth 3 times daily as needed for moderate pain 10/14/2020  Tracie Stewart MD         Medication history completed by: [facility]

## 2020-10-17 NOTE — OP NOTE
Procedure Date: 10/16/2020      PREOPERATIVE DIAGNOSIS:  Bilateral medial knee osteoarthritis.      POSTOPERATIVE DIAGNOSIS:  Bilateral medial knee osteoarthritis.      PROCEDURE PERFORMED:  Bilateral medial unicompartmental knee arthroplasty.      SURGEON:  Doug Senior MD      FIRST ASSISTANT:  Jodi      SECOND ASSISTANT:  Magno Chavarria PA-C.  It was medically necessary for two assistants due to the bilateral nature of this procedure.      INDICATION FOR SURGERY:  Barby has had longstanding anteromedial knee pain refractory to conservative care.  Risks, goals and options were discussed.  She understood and she wished to proceed.      DESCRIPTION OF PROCEDURE:  Under subarachnoid block anesthesia in the supine position, both knees were prepped and draped in the usual sterile fashion.  Pause for the cause occurred and all present were in agreement.  We started with the right knee.  We exsanguinated the limb and inflated the knee.  We used a 10 cm longitudinal anteromedial incision with a median parapatellar arthrotomy.  The lateral compartment was pristine, as was the ACL.  The femoral sulcus had minor grade I fissures and a slight palpable fissuring on the undersurface of the patella.  We elected to proceed with unicompartmentals.  We took down the anterior horn of the medial meniscus and removed substantial peripheral osteophytes from the medial femur and medial tibia.  There was no boss osteophyte.  We placed our retractors in exposing the joint and used the external alignment guide to resect 4 mm of proximal tibia.  We sized for a #4 tibial component, a 3 femoral component after we tested the flexion-extension gap at 10 and 13.  We then used the 13 mm block and did the distal resection with the chamfer cut.  We trialed the components and found the range of motion and stability to be ideal.  We prepared the femur with 2 lug holes and the tibia with 2 lug holes.  Ideal alignment was evident.  We  proceeded with posterior capsular injection and cementing tibia and femur in that sequence.  We removed any excess cement.  We then inserted an 8 mm insert with ideal position, range of motion and stability.  We closed in layers.  We turned our attention to the left knee.  We exsanguinated the limb and inflated the tourniquet.  We used a similar anteromedial incision with a median parapatellar arthrotomy.  We inspected the joint and decided to proceed with unicompartmental knee.  There was a longitudinal fissure in the undersurface of the patella and grade I femoral sulcus change.  We used the external alignment guide and resected 4 mm of proximal tibia and found a -2 gap and the flexion-extension balance and used the -2 jig to do a distal femoral resection and our chamfer cut.  We sized for a size 4 tibia and a size 3 femur and trialed the components with ideal results.  We created 2 lug holes in the femur and the tibia, removed our meniscus and then injected the posterior capsule.  We prepared the bone surfaces with pulse lavage and used a single batch of cement, cementing tibia and femur in that sequence with an 8 mm insert.  There were no operative complications noted.  We closed in layers then applied bilateral compression bandages with the patient returning to recovery in good condition.         ANA WALLS MD             D: 10/16/2020   T: 10/16/2020   MT:       Name:     LACEY BERNARD   MRN:      [id number]        Account:        [id number]   :      1960           Procedure Date: 10/16/2020      Document: [id number]

## 2020-10-17 NOTE — PROGRESS NOTES
[facility]     Medicine Progress Note - Hospitalist Service       Date of Admission:  10/16/2020  Assessment & Plan       Barby Jensen is a 60 year old female admitted on 10/16/2020.      Principal Problem:     Bilateral medial unicompartmental knee arthroplasty 10/16    Pain and DVT ppx and Merissa op abx and activity per ortho   Likely will go home today after PT clearance     Active Problems:       #DEAN (generalized anxiety disorder)  --Continue on PTA Lexapro.              Diet: Advance Diet as Tolerated: Regular Diet Adult  Regular Diet Adult    DVT Prophylaxis: Defer to primary service  Langley Catheter: not present  Code Status: Full Code              The patient's care was discussed with the Bedside Nurse, Patient and Primary team.    Ammy Olivas MD  Hospitalist Service  [facility]   Contact information available via [facility] Paging/Directory    ______________________________________________________________________    Interval History   No new symptoms reported per nursing staff .  Last night notes reviewed .  No chest pain or Shortness of breath reported.  No vomiting   No difficulty with voiding   Passing gas .  Nausea resolved       4 system ROS reviewed .    Data reviewed today: I reviewed all medications, new labs and imaging results over the last 24 hours.  Physical Exam   Vital Signs: Temp: 98  F (36.7  C) Temp src: Oral BP: 92/63 Pulse: 74   Resp: 13 SpO2: 98 % O2 Device: None (Room air)   Weight: 154 lbs 5.15 oz  Constitutional: awake, alert, cooperative, no apparent distress, and appears stated age  Respiratory: No increased work of breathing, good air exchange, clear to auscultation bilaterally, no crackles or wheezing  Cardiovascular: Normal apical impulse, regular rate and rhythm, normal S1 and S2, no S3 or S4, and no murmur noted  GI: No scars, normal bowel sounds, soft, non-distended, non-tender, no  masses palpated, no hepatosplenomegally    Data   Recent Labs   Lab 10/17/20  0549 10/16/20  0845   HGB 12.4  --      --    POTASSIUM 3.7  --    CHLORIDE 108  --    CO2 25  --    BUN 13  --    CR 0.72  --    ANIONGAP 7  --    GISELA 8.1*  --    GLC 98 98

## 2020-10-17 NOTE — PLAN OF CARE
Physical Therapy Discharge Summary    Reason for therapy discharge:    Discharged to home with outpatient therapy.    Progress towards therapy goal(s). See goals on Care Plan in [facility] electronic health record for goal details.  Goals met    Therapy recommendation(s):    Continued therapy is recommended.  Rationale/Recommendations:  Full return to PLOF.

## 2020-10-17 NOTE — PROGRESS NOTES
10/17/20 0800   Quick Adds   Type of Visit Initial PT Evaluation   Living Environment   People in home spouse   Current Living Arrangements house   Home Accessibility stairs to enter home   Number of Stairs, Main Entrance 5   Stair Railings, Main Entrance railings on both sides of stairs;railings safe and in good condition   Transportation Anticipated family or friend will provide   Living Environment Comments Going to daughter's after surgery. Daughter able to assist, [facility].   Self-Care   Usual Activity Tolerance excellent   Current Activity Tolerance good   Regular Exercise Yes   Activity/Exercise Type walking;other (see comments);biking  (yoga, physicallt active job)   Exercise Amount/Frequency 3-5 times/wk   Equipment Currently Used at Home none   Activity/Exercise/Self-Care Comment Works as RN at [employer]   Disability/Function   Hearing Difficulty or Deaf no   Wear Glasses or Blind yes   Vision Management glasses   Concentrating, Remembering or Making Decisions Difficulty no   Difficulty Communicating no   Difficulty Eating/Swallowing no   Walking or Climbing Stairs Difficulty yes   Dressing/Bathing Difficulty no   Toileting no   Doing Errands Independently Difficulty (such as shopping) no   Fall history within last six months no   Change in Functional Status Since Onset of Current Illness/Injury no   General Information   Onset of Illness/Injury or Date of Surgery 10/16/20   Referring Physician Dr. Senior   Patient/Family Therapy Goals Statement (PT) To go home and return to [facility]   Pertinent History of Current Problem (include personal factors and/or comorbidities that impact the POC) From chart: Barby Jensen is a 60 year old female admitted on 10/16/2020. POD # 0 s/p Total Right Knee Arthroplasty.   Weight-Bearing Status - LLE weight-bearing as tolerated   Weight-Bearing Status - RLE weight-bearing as tolerated   General Observations Well-appearing female supine in bed   Cognition   Orientation  Status (Cognition) oriented x 4   Affect/Mental Status (Cognition) WNL   Follows Commands (Cognition) WNL   Pain Assessment   Patient Currently in Pain Yes, see Vital Sign flowsheet  (6-7/10 with movement)   Integumentary/Edema   Integumentary/Edema Comments BLEs in wraps   Posture    Posture Not impaired   Range of Motion (ROM)   ROM Quick Adds ROM WFL   Strength   Manual Muscle Testing Quick Adds Strength WFL   Bed Mobility   Comment (Bed Mobility) Supine-sit with SBA   Transfers   Transfers sit-stand transfer   Sit-Stand Transfer   Sit/Stand Transfer Comments Sit-stand with walker andSBA   Gait/Stairs (Locomotion)   [place] Level (Gait) modified independence   Assistive Device (Gait) walker, front-wheeled   Distance in Feet (Required for LE Total Joints) 200 ft   Pattern (Gait) step-through   Maintains Weight-bearing Status (Gait) able to maintain   Comment (Gait/Stairs) Up and down 3 stairs with B rails and SBA, then 1 rail and SBA. Safe and steady.   Balance   Balance Comments Adequate for ambulation with AD   Sensory Examination   Sensory Perception patient reports no sensory changes   Coordination   Coordination no deficits were identified   Muscle Tone   Muscle Tone no deficits were identified   Clinical Impression   Criteria for Skilled Therapeutic Intervention yes, treatment indicated   PT Diagnosis (PT) Impaired ambulation, impaired BLE ROM   Influenced by the following impairments S/p surgery   Functional limitations due to impairments Ambulation   Clinical Presentation Stable/Uncomplicated   Clinical Presentation Rationale Clinician judgment, mobilizing well   Clinical Decision Making (Complexity) low complexity   Therapy Frequency (PT) One time eval and treatment only   Predicted Duration of Therapy Intervention (days/wks) Once   Planned Therapy Interventions (PT) gait training;home exercise program;patient/family education;ROM (range of motion);stair training;strengthening;transfer training  "  Anticipated Equipment Needs at Discharge (PT)   (pt has walker)   Risk & Benefits of therapy have been explained evaluation/treatment results reviewed;care plan/treatment goals reviewed;risks/benefits reviewed;patient   PT Discharge Planning    PT Discharge Recommendation (DC Rec) home with assist;home with outpatient physical therapy   PT Rationale for DC Rec Pt mobilizing very well and safe with all mobility. Assist available at home. OP PT for full PLOF   PT Brief overview of current status  Eval completed. All mobility with SBA, walker for gait, navigates hallway and stairs safely. Safe with all transfers. Reviewed TKA HEP. Pt ready to d/c from PT standpoint.   [facility] AM-PAC  \"6 Clicks\" V.2 Basic Mobility Inpatient Short Form   1. Turning from your back to your side while in a flat bed without using bedrails? 4 - None   2. Moving from lying on your back to sitting on the side of a flat bed without using bedrails? 4 - None   3. Moving to and from a bed to a chair (including a wheelchair)? 4 - None   4. Standing up from a chair using your arms (e.g., wheelchair, or bedside chair)? 4 - None   5. To walk in hospital room? 4 - None   6. Climbing 3-5 steps with a railing? 4 - None   Basic Mobility Raw Score (Score out of 24.Lower scores equate to lower levels of function) 24   Total Evaluation Time   Total Evaluation Time (Minutes) 8     "

## 2020-10-18 NOTE — DISCHARGE SUMMARY
ORTHOPAEDIC DISCHARGE SUMMARY     Date of Admission: 10/16/2020  Date of Discharge: 10/17/2020  2:28 PM  Disposition: Home  Staff Physician: Dr. Senior    Primary Care Provider: Eryn Caro    DISCHARGE DIAGNOSIS:  Arthritis of right knee [M17.11]    PROCEDURES: Procedure(s):  Bilateral Knee Unicompartment Arthroplasty on 10/16/2020    BRIEF HISTORY: Barby has had longstanding anteromedial knee pain refractory to conservative care.  Risks, goals and options were discussed.  She understood and she wished to proceed.     HOSPITAL COURSE:    On 10/16/2020 Barby Jensen underwent bilateral uni knee arthroplasty.  Surgery was uncomplicated.  Barby Jensen did well post-operatively and worked with physical and occupational therapy. The Internal Medicine physicians were consulted post operatively to aid in management of medical comorbidities.  The patient was tolerating a regular diet without GI distress/nausea or vomiting and was voiding spontaneously. All PT/OT goals for safe discharge were met. Pain medications were weaned to the point where post-operative pain was controlled on oral medications. Stool softeners have been used while taking pain medications to help prevent constipation. Barby Jensen was deemed medically safe to discharge.     Antibiotics:  Given periop and 24 hours postop.  DVT prophylaxis:  Aspirin daily for 4 weeks  PT Progress:  Has met PT/OT goals for safe mobility.   Pain Meds:  Weaned off all IV pain meds by discharge.  Inpatient Events: No significant events or complications.    Discharge orders and instructions as below.    FOLLOWUP:    Dr. Senior (Yady LOPEZ) in [address] [place] [address] [facility] ([address] [place] [address]). Call [phone] to schedule a follow-up appointment at this location with your provider.     Future Appointments   Date Time Provider Department Center   10/19/2020 10:00 AM Dawit Felipe PT [facility] [place]   10/21/2020  10:00 AM Dawit Felipe PT [facility] [place]   10/26/2020 10:00 AM Dawit Felipe PT [facility] [place]   10/28/2020 10:00 AM Dawit Felipe PT [facility] [place]   10/29/2020 10:30 AM Yady Richey APRN CNP [facility]   11/18/2020  8:00 AM Fred Meredith MD [facility]   11/30/2020 10:15 AM Doug Senior MD [facility]   12/15/2020  3:20 PM Tracie [place] MD [facility]       PLANNED DISCHARGE ORDERS:           Discharge Medication List as of 10/17/2020 12:59 PM      START taking these medications    Details   aspirin 81 MG EC tablet Take 2 tablets (162 mg) by mouth daily, Disp-60 tablet, R-0, E-Prescribe      gabapentin (NEURONTIN) 100 MG capsule Take 1 capsule (100 mg) by mouth 3 times daily as needed (If the patient is reporting neuropathic or nerve pain.), Disp-10 capsule, R-0, E-Prescribe      ondansetron (ZOFRAN-ODT) 4 MG ODT tab Take 1 tablet (4 mg) by mouth every 6 hours as needed for nausea or vomiting, Disp-10 tablet, R-0, E-Prescribe      polyethylene glycol (MIRALAX) 17 g packet Take 17 g by mouth daily, Disp-7 packet, R-0, E-Prescribe      senna-docusate (SENOKOT-S/PERICOLACE) 8.6-50 MG tablet Take 1-2 tablets by mouth 2 times daily Take while on oral narcotics to prevent or treat constipation., Disp-30 tablet, R-0, E-PrescribeWhile taking narcotics         CONTINUE these medications which have CHANGED    Details   acetaminophen (TYLENOL) 325 MG tablet Take 2 tablets (650 mg) by mouth every 4 hours, Disp-100 tablet, R-0, E-Prescribe      oxyCODONE (ROXICODONE) 5 MG tablet Take 1-2 tablets (5-10 mg) by mouth every 4 hours as needed for pain (Moderate to Severe), Disp-30 tablet, R-0, E-Prescribe         CONTINUE these medications which have NOT CHANGED    Details   calcium carbonate-vitamin D (OS-GISELA) 500-400 MG-UNIT tablet Take 1 tablet by mouth every evening, Historical      escitalopram (LEXAPRO) 10 MG tablet Take 1 tablet (10 mg) by mouth daily, Disp-90  "tablet,R-3, E-Prescribe      Glucosamine-Chondroit-Vit C-Mn (GLUCOSAMINE 1500 COMPLEX PO) Take by mouth every evening , Historical      Multiple Vitamins-Minerals (PRESERVISION AREDS) CAPS Take one daily, Disp-90 capsule, R-3, E-Prescribe      norethindrone-ethinyl estradiol (FEMHRT 1/5) 1-5 MG-MCG tablet Take 1 tablet by mouth daily, Disp-84 tablet,R-3, E-Prescribe      sulfacetamide sodium, Acne, (KLARON) 10 % lotion Apply once at night - needs clinic visitDisp-118 mL,[id number]         STOP taking these medications       diclofenac (VOLTAREN) 50 MG EC tablet Comments:   Reason for Stopping:                 Discharge Procedure Orders   PHYSICAL THERAPY REFERRAL   Standing Status: Future   Referral Priority: Routine Referral Type: Rehab Therapy Physical Therapy   Number of Visits Requested: 1     Reason for your hospital stay   Order Comments: You stayed in the hospital overnight following your knee surgery     Contact Surgeon Team   Order Comments: You may experience symptoms that require follow-up before your scheduled appointment. Refer to the \"Stoplight Tool\" for instructions on when to contact your Surgeon Team if you are concerned about pain control, blood clots, constipation, or if you are unable to urinate.     Orthopedic Urgent Care   Order Comments: If you are not able to reach your Surgeon Team and you need immediate care, go to the Orthopedic Urgent Care at your Surgeon's office.  Do NOT go to the Emergency Room unless you have shortness of breath, chest pain, or other signs of a medical emergency.     Call 911   Order Comments: Call 911 immediately if you experience sudden-onset chest pain, arm weakness/numbness, slurred speech, or shortness of breath     Breathing exercises   Order Comments: Perform breathing exercises using your Incentive Spirometer 10 times per hour while awake for 2 weeks.     Fever Management   Order Comments: A low grade fever can be expected after surgery.  Use acetaminophen " (TYLENOL) as needed for fever management.  Contact your Surgeon Team if you have a fever greater than 101.5 F, chills, and/or night sweats.     Constipation management   Order Comments: Constipation (hard, dry bowel movements) is expected after surgery due to the combination of being less active, the anesthetic, and the opioid pain medication.  You can do the following to help reduce constipation:  ~  FLUIDS:  Drink clear liquids (water or Gatorade), or juice (apple/prune).  ~  DIET:  Eat a fiber rich diet.    ~  ACTIVITY:  Get up and move around several times a day.  Increase your activity as you are able.  MEDICATIONS:  Reduce the risk of constipation by starting medications before you are constipated.  You can take Miralax   (1 packet as directed) and/or a stool softener (Senokot 1-2 tablets 1-2 times a day).  If you already have constipation and these medications are not working, you can get magnesium citrate and use as directed.  If you continue to have constipation you can try an over the counter suppository or enema.  Call your Surgeon Team if it has been greater than 3 days since your last bowel movement.     Reduced Urine Output   Order Comments: Changes in the amount of fluids you drank before and after surgery may result in problems urinating.  It is important to stay well-hydrated after surgery and drink plenty of water. If it has been greater than 8 hours since you have urinated despite drinking plenty of water, call your Surgeon Team.     Anticoagulation - aspirin   Order Comments: Take the aspirin as prescribed for a total of four weeks after surgery.  This is given to help minimize your risk of blood clot.     Exercises to prevent blood clots   Order Comments: Unless otherwise directed by your Surgeon team, perform the following exercises at least three times per day for the first four weeks after surgery to prevent blood clots in your legs: 1) Point and flex your feet (Ankle Pumps), 2) Move your  "ankle around in big circles, 3) Wiggle your toes, 4) Walk, even for short distances, several times a day, will help decrease the risk of blood clots.     Pain after Surgery   Order Comments: Pain after surgery is normal and expected.  You will   have some amount of pain for several weeks after surgery.  Your pain will improve with time.  There are several things you can do to help reduce your pain including: rest, ice, elevation, and using pain medications as needed. Contact your Surgeon Team if you have pain that persists or worsens after surgery despite rest, ice, elevation, and taking your medication(s) as prescribed. Contact your Surgeon Team if you have new numbness, tingling, or weakness in your operative extremity.     Swelling after Surgery   Order Comments: Swelling and/or bruising of the surgical extremity is common and may persist for several months after surgery. In addition to frequent icing and elevation, gentle compressive support with an ACE wrap or tubigrip may help with swelling. Apply compression regularly, removing at least twice daily to perform skin checks. Contact your Surgeon Team if your swelling increases and is NOT associated with an increase in your activity level, or if your swelling increases and is associated with redness and pain.     LOWER Extremity Elevation   Order Comments: Swelling is expected for several months after surgery. This type of swelling is usually associated with gravity and activity, and can be improved with elevation.   The best way to do this is to get your \"toes above your nose\" by laying down and placing several pillows lengthwise under your calf and heel. When elevating your leg keep your knee completely straight. Perform this elevation as often as possible especially for the first two weeks after surgery.     Cold therapy   Order Comments: Ice can be used to control swelling and discomfort after surgery. Place a thin towel over your operative site and apply the " ice pack overtop. Leave ice pack in place for 20 minutes, then remove for 20 minutes. Repeat this 20 minutes on/20 minutes off routine as often as tolerated.     acetaminophen (TYLENOL) Instructions   Order Comments: You were discharged with acetaminophen (TYLENOL) for pain management after surgery. Acetaminophen most effectively manages pain symptoms when it is taken on a schedule without missing doses (every four, six, or eight hours). Your Provider will prescribe a safe daily dose between 3000 - 4000 mg.  Do NOT exceed this daily dose. Most patients use acetaminophen for pain control for the first four weeks after surgery.  You can wean from this medication as your pain decreases.     NSAID Instructions   Order Comments: You were discharged with an anti-inflammatory medication for pain management to use in combination with acetaminophen (TYLENOL) and the narcotic pain medication.  Take this medication exactly as directed.  You should only take one anti-inflammatory at a time.  Some common anti-inflammatories include: ibuprofen (ADVIL, MOTRIN), naproxen (ALEVE, NAPROSYN), celecoxib (CELEBREX), meloxicam (MOBIC), ketorolac (TORADOL).  Take this medication with food and water.     Opioids - Tapering Instructions   Order Comments: In the first three days following surgery, your symptoms may warrant use of the narcotic pain medication every four to six hours as prescribed. This is normal. As your pain symptoms improve, focus your efforts on decreasing (tapering) use of narcotic medications. The most successful tapering strategy is to first, decrease the number of tablets you take every 4-6 hours to the minimum prescribed. Then, increase the amount of time between doses.  For example:  First, taper to   or 1 tablet every 4-6 hours.  Then, taper to   or 1 tablet every 6-8 hours.  Then, taper to   or 1 tablet every 8-10 hours.  Then, taper to   or 1 tablet every 10-12 hours.  Then, taper to   or 1 tablet at bedtime.  The  bedtime dose can help with comfort during sleep and is typically the last dose to be discontinued after surgery.     Follow Up Care   Order Comments: Follow-up with your Surgeon Team in four weeks for wound check.     Activity - Total Knee Arthroplasty     Order Specific Question Answer Comments   Is discharge order? Yes      Return to Driving   Order Comments: Driving is NOT permitted until directed by your provider. Under no circumstance are you permitted to drive while using narcotic pain medications.     Return to athletic activities   Order Comments: Activities such as swimming, bicycling, jogging, running, and stop-and-go sports should be avoided until permitted by your Provider.     Return to school/work   Order Comments: You may return to work/school when directed by your Provider.     Continuous Passive Motion Machine   Order Comments: Settings (degrees): 0 degrees to flexion tolerance with a goal of 90 degrees  Advance CPM (degrees): increments of 5 degrees every 30 minutes.  Perform for 6-8 hours daily for four weeks     Weight bearing as tolerated   Order Comments: Weight bearing as tolerated on your operative extremity.     Wound care   Order Comments: You have a clean dressing on your surgical wound. Dressing change instructions as follows: remove your dressing in 7 days, and leave incision open to air. Contact your Surgeon Team if you have increased redness, warmth around the surgical wound, and/or drainage from the surgical wound.     Shower with wound/dressing covered   Order Comments: You must COVER your dressing or incision with saran wrap (or any other non-permeable covering) to allow the incision to remain dry while showering.  You may shower 1 day after surgery as long as the surgical wound stays dry. Continue to cover your dressing or incision for showering until your first office visit.  You are strictly prohibited from soaking   or submerging the surgical wound underwater.     NO Tub bathing    Order Comments: Tub bathing, swimming, or any other activities that will cause your incision to be submerged in water should be avoided until allowed by your Surgeon.     Opioid Instructions (Less than 65 years)   Order Comments: You were discharged with an opioid medication (hydromorphone, oxycodone, hydrocodone, or tramadol). This medication should only be taken for breakthrough pain that is not controlled with acetaminophen (TYLENOL). If you rate your pain less than 3 you do not need this medication.  Pain rating 0-3:  You do not need this medication.  Pain rating 4-6:  Take 1 tablet every 4-6 hours as needed  Pain rating 7-10:  Take 2 tablets every 4-6 hours as needed.  Do not exceed 6 tablets per day     ABO/Rh Type and Screen   Standing Status: Standing Number of Occurrences: 1 Standing Exp. Date: 11/14/20     Regular Diet Adult     Order Specific Question Answer Comments   Is discharge order? Yes      Assign Questionnaire Series to Patient       Elijah Roman DO  Adult Joint Reconstruction Fellow  Dept Orthopaedic Surgery, [facility]  [phone] Pager [phone] Office

## 2020-10-19 ENCOUNTER — THERAPY VISIT (OUTPATIENT)
Dept: PHYSICAL THERAPY | Facility: CLINIC | Age: 60
End: 2020-10-19
Attending: ORTHOPAEDIC SURGERY
Payer: COMMERCIAL

## 2020-10-19 DIAGNOSIS — G89.29 CHRONIC PAIN OF BOTH KNEES: Primary | ICD-10-CM

## 2020-10-19 DIAGNOSIS — M17.11 ARTHRITIS OF RIGHT KNEE: ICD-10-CM

## 2020-10-19 DIAGNOSIS — Z98.890 STATUS POST KNEE SURGERY: ICD-10-CM

## 2020-10-19 DIAGNOSIS — M25.561 CHRONIC PAIN OF BOTH KNEES: Primary | ICD-10-CM

## 2020-10-19 DIAGNOSIS — R26.9 ABNORMAL GAIT: ICD-10-CM

## 2020-10-19 DIAGNOSIS — M25.562 CHRONIC PAIN OF BOTH KNEES: Primary | ICD-10-CM

## 2020-10-19 DIAGNOSIS — Z96.653 S/P BILATERAL UNICOMPARTMENTAL KNEE REPLACEMENT: Primary | ICD-10-CM

## 2020-10-19 PROBLEM — M54.2 CERVICAL PAIN: Status: RESOLVED | Noted: 2018-01-16 | Resolved: 2020-10-19

## 2020-10-19 PROCEDURE — 97116 GAIT TRAINING THERAPY: CPT | Mod: GP | Performed by: PHYSICAL THERAPIST

## 2020-10-19 PROCEDURE — 97110 THERAPEUTIC EXERCISES: CPT | Mod: GP | Performed by: PHYSICAL THERAPIST

## 2020-10-19 PROCEDURE — 97161 PT EVAL LOW COMPLEX 20 MIN: CPT | Mod: GP | Performed by: PHYSICAL THERAPIST

## 2020-10-21 ENCOUNTER — THERAPY VISIT (OUTPATIENT)
Dept: PHYSICAL THERAPY | Facility: CLINIC | Age: 60
End: 2020-10-21
Payer: COMMERCIAL

## 2020-10-21 DIAGNOSIS — R26.9 ABNORMAL GAIT: ICD-10-CM

## 2020-10-21 PROCEDURE — 97110 THERAPEUTIC EXERCISES: CPT | Mod: GP | Performed by: PHYSICAL THERAPIST

## 2020-10-26 ENCOUNTER — THERAPY VISIT (OUTPATIENT)
Dept: PHYSICAL THERAPY | Facility: CLINIC | Age: 60
End: 2020-10-26
Payer: COMMERCIAL

## 2020-10-26 DIAGNOSIS — R26.9 ABNORMAL GAIT: ICD-10-CM

## 2020-10-26 PROCEDURE — 97110 THERAPEUTIC EXERCISES: CPT | Mod: GP | Performed by: PHYSICAL THERAPIST

## 2020-10-28 ENCOUNTER — THERAPY VISIT (OUTPATIENT)
Dept: PHYSICAL THERAPY | Facility: CLINIC | Age: 60
End: 2020-10-28
Payer: COMMERCIAL

## 2020-10-28 DIAGNOSIS — R26.9 ABNORMAL GAIT: ICD-10-CM

## 2020-10-28 PROCEDURE — 97110 THERAPEUTIC EXERCISES: CPT | Mod: GP | Performed by: PHYSICAL THERAPIST

## 2020-10-29 ENCOUNTER — ANCILLARY PROCEDURE (OUTPATIENT)
Dept: GENERAL RADIOLOGY | Facility: CLINIC | Age: 60
End: 2020-10-29
Attending: NURSE PRACTITIONER
Payer: COMMERCIAL

## 2020-10-29 ENCOUNTER — OFFICE VISIT (OUTPATIENT)
Dept: ORTHOPEDICS | Facility: CLINIC | Age: 60
End: 2020-10-29
Payer: COMMERCIAL

## 2020-10-29 DIAGNOSIS — Z96.653 AFTERCARE FOLLOWING BILATERAL KNEE JOINT REPLACEMENT SURGERY: Primary | ICD-10-CM

## 2020-10-29 DIAGNOSIS — Z96.653 S/P BILATERAL UNICOMPARTMENTAL KNEE REPLACEMENT: ICD-10-CM

## 2020-10-29 DIAGNOSIS — Z96.659 HISTORY OF PARTIAL KNEE REPLACEMENT: ICD-10-CM

## 2020-10-29 DIAGNOSIS — Z47.1 AFTERCARE FOLLOWING BILATERAL KNEE JOINT REPLACEMENT SURGERY: Primary | ICD-10-CM

## 2020-10-29 PROCEDURE — 99024 POSTOP FOLLOW-UP VISIT: CPT | Performed by: NURSE PRACTITIONER

## 2020-10-29 PROCEDURE — 73560 X-RAY EXAM OF KNEE 1 OR 2: CPT | Mod: GC | Performed by: RADIOLOGY

## 2020-10-29 RX ORDER — TRAMADOL HYDROCHLORIDE 50 MG/1
50 TABLET ORAL EVERY 6 HOURS PRN
Qty: 20 TABLET | Refills: 0 | Status: SHIPPED | OUTPATIENT
Start: 2020-10-29 | End: 2020-11-09

## 2020-10-29 NOTE — PROGRESS NOTES
"DOS: 10/16/2020 bilateral medial uniarthroplasties    Yoanna is seen today post op of above procedures. She is reporting good progress and already has \"complete relief of the pain she had on the inside part of her knees\". She reports this pain as \"different\" than before the surgery. She is not taking narcotics but using voltaren 50 mg tabs TID with good results. Incisions are all healed/intact. She is walking without a limp or any assistive devices. ROM: L:4-118 R:2-116. Alignment is neutral with intact CMS. Minimal effusion. Good stability on exam. No calf pain.    Xrays taken today show ideal positron of the tibial and femoral components of the partial knee replacements without lucency or change in position. Lateral compartments of both knees are well preserved.     Dx:  1. Bilateral knee medial uniarthroplasties    Plan:  1. Yoanna will continue to focus on her therapy and advance activities as tolerated. I refilled her voltaren and offered tramadol to help a needed for pain.   2. Follow up in 4-6 weeks for a full length and bilateral lateral xrays on arrival.       "

## 2020-10-29 NOTE — NURSING NOTE
Reason For Visit:   Chief Complaint   Patient presents with     RECHECK     2 week POP Bilateral knee Unicompartment Arthroplasty DOS: 10/16/20       There were no vitals taken for this visit.    Pain Assessment  Patient Currently in Pain: Yes  0-10 Pain Scale: 6  Primary Pain Location: Knee    Betty Vargas ATC

## 2020-10-29 NOTE — LETTER
"    10/29/2020         RE: Barby Jensen  [address] [address]        Dear Colleague,    Thank you for referring your patient, Barby Jensen, to the [facility] [place]. Please see a copy of my visit note below.    DOS: 10/16/2020 bilateral medial uniarthroplasties    Yoanna is seen today post op of above procedures. She is reporting good progress and already has \"complete relief of the pain she had on the inside part of her knees\". She reports this pain as \"different\" than before the surgery. She is not taking narcotics but using voltaren 50 mg tabs TID with good results. Incisions are all healed/intact. She is walking without a limp or any assistive devices. ROM: L:4-118 R:2-116. Alignment is neutral with intact CMS. Minimal effusion. Good stability on exam. No calf pain.    Xrays taken today show ideal positron of the tibial and femoral components of the partial knee replacements without lucency or change in position. Lateral compartments of both knees are well preserved.     Dx:  1. Bilateral knee medial uniarthroplasties    Plan:  1. Yoanna will continue to focus on her therapy and advance activities as tolerated. I refilled her voltaren and offered tramadol to help a needed for pain.   2. Follow up in 4-6 weeks for a full length and bilateral lateral xrays on arrival.         Yady Richey, MAICOL CNP    "

## 2020-11-02 ENCOUNTER — THERAPY VISIT (OUTPATIENT)
Dept: PHYSICAL THERAPY | Facility: CLINIC | Age: 60
End: 2020-11-02
Payer: COMMERCIAL

## 2020-11-02 DIAGNOSIS — R26.9 ABNORMAL GAIT: ICD-10-CM

## 2020-11-02 PROCEDURE — 97110 THERAPEUTIC EXERCISES: CPT | Mod: GP | Performed by: PHYSICAL THERAPIST

## 2020-11-09 ENCOUNTER — MYC REFILL (OUTPATIENT)
Dept: ORTHOPEDICS | Facility: CLINIC | Age: 60
End: 2020-11-09

## 2020-11-09 DIAGNOSIS — Z96.653 AFTERCARE FOLLOWING BILATERAL KNEE JOINT REPLACEMENT SURGERY: ICD-10-CM

## 2020-11-09 DIAGNOSIS — Z47.1 AFTERCARE FOLLOWING BILATERAL KNEE JOINT REPLACEMENT SURGERY: ICD-10-CM

## 2020-11-09 RX ORDER — TRAMADOL HYDROCHLORIDE 50 MG/1
50 TABLET ORAL EVERY 6 HOURS PRN
Qty: 20 TABLET | Refills: 0 | Status: SHIPPED | OUTPATIENT
Start: 2020-11-09 | End: 2020-11-12

## 2020-11-09 RX ORDER — TRAMADOL HYDROCHLORIDE 50 MG/1
50 TABLET ORAL EVERY 6 HOURS PRN
Qty: 20 TABLET | Refills: 0 | Status: SHIPPED | OUTPATIENT
Start: 2020-11-09

## 2020-11-12 ENCOUNTER — TELEPHONE (OUTPATIENT)
Dept: ORTHOPEDICS | Facility: CLINIC | Age: 60
End: 2020-11-12

## 2020-11-12 DIAGNOSIS — L71.9 ROSACEA: ICD-10-CM

## 2020-11-12 DIAGNOSIS — Z96.653 AFTERCARE FOLLOWING BILATERAL KNEE JOINT REPLACEMENT SURGERY: ICD-10-CM

## 2020-11-12 DIAGNOSIS — Z47.1 AFTERCARE FOLLOWING BILATERAL KNEE JOINT REPLACEMENT SURGERY: ICD-10-CM

## 2020-11-12 RX ORDER — TRAMADOL HYDROCHLORIDE 50 MG/1
50 TABLET ORAL EVERY 8 HOURS PRN
Qty: 20 TABLET | Refills: 0
Start: 2020-11-12 | End: 2020-12-01

## 2020-11-12 NOTE — TELEPHONE ENCOUNTER
Yoanna was called by RN.  She underwent bilateral medial uni knee arthroplasties on 10/16/20, is taking tramadol mostly at bedtime, states oxycodone caused upset stomach.  She is doing her exercises twice daily and is up walking without ambulatory device for 20 minutes/day along with trips to bathroom.  Yoanna states in the afternoon, her legs feel more stiff and heavy.  She states there is little swelling and the left knee has a distal suture site which she recently clipped.  Rx for tramadol refill was called to pt's pharmacy.  Zenia Ravi RN

## 2020-11-12 NOTE — TELEPHONE ENCOUNTER
[facility] Call Center    Phone Message    May a detailed message be left on voicemail: yes     Reason for Call: Other: Patient is following up on her request for a refill on the tramadol 50MG. Please send a Staff Rankert message once this is done.     Action Taken: Message routed to:  [facility] ([facility] Orthopedic    Travel Screening: Not Applicable

## 2020-11-16 ENCOUNTER — HEALTH MAINTENANCE LETTER (OUTPATIENT)
Age: 60
End: 2020-11-16

## 2020-11-18 ENCOUNTER — OFFICE VISIT (OUTPATIENT)
Dept: DERMATOLOGY | Facility: CLINIC | Age: 60
End: 2020-11-18
Payer: COMMERCIAL

## 2020-11-18 ENCOUNTER — THERAPY VISIT (OUTPATIENT)
Dept: PHYSICAL THERAPY | Facility: CLINIC | Age: 60
End: 2020-11-18
Payer: COMMERCIAL

## 2020-11-18 DIAGNOSIS — L81.4 SOLAR LENTIGO: ICD-10-CM

## 2020-11-18 DIAGNOSIS — D22.9 MULTIPLE BENIGN NEVI: ICD-10-CM

## 2020-11-18 DIAGNOSIS — R26.9 ABNORMAL GAIT: ICD-10-CM

## 2020-11-18 DIAGNOSIS — L82.1 SEBORRHEIC KERATOSIS: ICD-10-CM

## 2020-11-18 DIAGNOSIS — L71.9 ROSACEA: Primary | ICD-10-CM

## 2020-11-18 PROCEDURE — 97110 THERAPEUTIC EXERCISES: CPT | Mod: GP | Performed by: PHYSICAL THERAPIST

## 2020-11-18 PROCEDURE — 99203 OFFICE O/P NEW LOW 30 MIN: CPT | Performed by: DERMATOLOGY

## 2020-11-18 RX ORDER — METRONIDAZOLE 10 MG/G
GEL TOPICAL DAILY
Qty: 60 G | Refills: 11 | Status: SHIPPED | OUTPATIENT
Start: 2020-11-18

## 2020-11-18 RX ORDER — SULFACETAMIDE SODIUM 100 MG/ML
LOTION TOPICAL
Qty: 118 ML | Refills: 11 | Status: SHIPPED | OUTPATIENT
Start: 2020-11-18

## 2020-11-18 ASSESSMENT — PAIN SCALES - GENERAL: PAINLEVEL: NO PAIN (0)

## 2020-11-18 NOTE — NURSING NOTE
Dermatology Rooming Note    Barby Jensen's goals for this visit include:   Chief Complaint   Patient presents with     Skin Check     Yoanna is here today for a skin check. She is concerned about a spot on her nose. She would also like to talk about her rosacea.      Deanna Weston, [facility]

## 2020-11-18 NOTE — LETTER
11/18/2020       RE: Barby Jensen  [facility] [address]     Dear Colleague,    Thank you for referring your patient, Barby Jensen, to the [facility] [place] at [facility]. Please see a copy of my visit note below.    .  [facility] Dermatology Note      Dermatology Problem List:  1.Rosacea   - metrogel and sulfacetamide prescribed years ago by outside provider    Encounter Date: Nov 18, 2020    CC:   Chief Complaint   Patient presents with     Skin Check     Yoanna is here today for a skin check. She is concerned about a spot on her nose. She would also like to talk about her rosacea.          History of Present Illness:  Ms. Barby Jensen is a 60 year old female who with no history of skin cancer presents for total body skin exam.    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  She does not follow with dermatology here.  She has a history of rosacea and has been on metronidazole gel and sulfacetamide for many years.  She is presenting today for 2 reasons.  First she left a total body skin exam.  Second, during zoom video calls, she has noticed that she has a linear red line down the front of her nose.  Otherwise, she has no family history of skin cancer and no personal history of skin cancer.  [place] and [place] lot of sun in summer.  She uses sunblock.  She has no new concerning moles that she has noticed.  She does have a hyperpigmented mole in her groin.  Is been present for many years has been unchanged.    Past Medical History:   Patient Active Problem List   Diagnosis     CARDIOVASCULAR SCREENING; LDL GOAL LESS THAN 130     DEAN (generalized anxiety disorder)     Rosacea     Chronic pain of both knees     Hot flashes     Arthritis of right knee     Status post knee surgery     Abnormal gait     Past Medical History:   Diagnosis Date     Acne rosacea       ASCUS on Pap smear 08/2006     DEAN (generalized anxiety disorder)      Reactive airway disease      Past Surgical History:   Procedure Laterality Date     ARTHROPLASTY KNEE UNICOMPARTMENT BILATERAL Bilateral 10/16/2020    Procedure: Bilateral Knee Unicompartment Arthroplasty;  Surgeon: Doug Senior MD;  [facility]     ARTHROSCOPY KNEE BILATERAL Bilateral 09/04/2015    Procedure: ARTHROSCOPY KNEE BILATERAL;  Surgeon: Tracie Stewart MD;  [facility]     COLONOSCOPY       EXCISE MASS UPPER EXTREMITY  10/2009    right forearm lipoma     INJECT EPIDURAL CERVICAL / THORACIC SINGLE N/A 03/07/2018    Procedure: INJECT EPIDURAL CERVICAL / THORACIC SINGLE;  Cervical Epidural Steroid Injectioncervical 7, throasic 1;  Surgeon: Levi Conteh MD;  [facility]     KNEE SURGERY  09/2015    Bilateral meniscectomies       Social History:  Patient reports that she has never smoked. She has never used smokeless tobacco. She reports current alcohol use. She reports that she does not use drugs.    Family History:  Family History   Problem Relation Age of Onset     Gastrointestinal Disease Mother      Hearing Loss Mother      Hypertension Mother      Heart Failure Mother      Osteopenia Mother      Atrial fibrillation Mother         pacemaker     Pacemaker Mother      Hearing Loss Father      Prostate Cancer Father      Eye Disorder Father         macular degeneration     Osteopenia Father         after tx for prostate cancer     Osteoarthritis Father 72        knee replacement     Macular Degeneration Father      Depression Paternal Grandmother      Osteoarthritis Brother 62        knee replacement     Atrial fibrillation Brother      Melanoma No family hx of      Skin Cancer No family hx of        Medications:  Current Outpatient Medications   Medication Sig Dispense Refill     acetaminophen (TYLENOL) 325 MG tablet Take 2 tablets (650 mg) by mouth every 4 hours 100 tablet 0     aspirin 81 MG EC tablet Take  2 tablets (162 mg) by mouth daily 60 tablet 0     calcium carbonate-vitamin D (OS-GISELA) 500-400 MG-UNIT tablet Take 1 tablet by mouth every evening       diclofenac (VOLTAREN) 50 MG EC tablet Take 1 tablet (50 mg) by mouth 3 times daily 90 tablet 3     escitalopram (LEXAPRO) 10 MG tablet Take 1 tablet (10 mg) by mouth daily (Patient taking differently: Take 10 mg by mouth every morning ) 90 tablet 3     gabapentin (NEURONTIN) 100 MG capsule Take 1 capsule (100 mg) by mouth 3 times daily as needed (If the patient is reporting neuropathic or nerve pain.) 10 capsule 0     Glucosamine-Chondroit-Vit C-Mn (GLUCOSAMINE 1500 COMPLEX PO) Take by mouth every evening        Multiple Vitamins-Minerals (PRESERVISION AREDS) CAPS Take one daily (Patient taking differently: every evening Take one daily) 90 capsule 3     norethindrone-ethinyl estradiol (FEMHRT 1/5) 1-5 MG-MCG tablet Take 1 tablet by mouth daily (Patient taking differently: Take 1 tablet by mouth every morning ) 84 tablet 3     ondansetron (ZOFRAN-ODT) 4 MG ODT tab Take 1 tablet (4 mg) by mouth every 6 hours as needed for nausea or vomiting 10 tablet 0     sulfacetamide sodium, Acne, (KLARON) 10 % lotion Apply once at night - needs clinic visit (Patient taking differently: Apply topically every morning Apply once at night - needs clinic visit) 118 mL 0     traMADol (ULTRAM) 50 MG tablet Take 1 tablet (50 mg) by mouth every 8 hours as needed for severe pain 20 tablet 0     traMADol (ULTRAM) 50 MG tablet Take 1 tablet (50 mg) by mouth every 6 hours as needed for severe pain 20 tablet 0     oxyCODONE (ROXICODONE) 5 MG tablet Take 1-2 tablets (5-10 mg) by mouth every 4 hours as needed for pain (Moderate to Severe) (Patient not taking: Reported on 10/29/2020) 30 tablet 0     senna-docusate (SENOKOT-S/PERICOLACE) 8.6-50 MG tablet Take 1-2 tablets by mouth 2 times daily Take while on oral narcotics to prevent or treat constipation. (Patient not taking: Reported on  10/29/2020) 30 tablet 0        No Known Allergies      Review of Systems:  -As per HPI  -Constitutional: Otherwise feeling well today, in usual state of health.  -Skin: As above in HPI. No additional skin concerns.    Physical exam:  Vitals: There were no vitals taken for this visit.  GEN: This is a well developed, well-nourished female in no acute distress, in a pleasant mood.    SKIN: Total skin excluding the undergarment areas was performed. The exam included the head/face, neck, both arms, chest, back, abdomen, both legs, digits and/or nails.   -1 cm seb keratosis over right pubic bone  -There are erythematous papules and scattered telangectasias on the bilateral cheeks and nose. There is a linear-appearing collection of telangiectasias down the front of the nose.   -Brown macules on sun exposed areas  -Brown macules and papules on trunk and extremities without concerning dermoscopy features.   -No other lesions of concern on areas examined.     Labs:  None today    Impression/Plan:    1. Acne rosacea, papulopustular and erythrotelangectatic type  - Diligent photoprotection; avoidance of triggers discussed  - Continue metronidazole 1.0% gel and sulfacetamide 10% lotion (refills provided).  -We discussed laser therapy as a means to improve her rosacea.  Suspect that the linear line on her nose that she is seen on some calls is simply positional it is more apparent on the video.  Patient elected not to pursue laser treatment at this time which we will consider    2. Benign lesions: Multiple benign nevi, solar lentigos, seborrheic keratoses. Explained to patient benign nature of lesion. No treatment is necessary at this time unless the lesion changes or becomes symptomatic.     - ABCDs of melanoma were discussed and self skin checks were advised.  - Sun precaution was advised including the use of sun screens of SPF 30 or higher, sun protective clothing, and avoidance of tanning beds.    Follow-up in 1 year, earlier  for new or changing lesions.      staffed the patient.    Staff Involved:  Resident(Lloyd Bloom, PGY3, IM)/Staff Dr. Meredith    Staff Physician Comments:   I saw and evaluated the patient with the resident and I agree with the assessment and plan.  I was present for the examination.    Fred Meredith MD  Pronouns: he/him/his    Department of Dermatology  [facility]: Phone: [phone], Fax:[phone]  [facility]: Phone: [phone] Fax: [phone]

## 2020-11-18 NOTE — PROGRESS NOTES
.  [facility] Dermatology Note      Dermatology Problem List:  1.Rosacea   - metrogel and sulfacetamide prescribed years ago by outside provider    Encounter Date: Nov 18, 2020    CC:   Chief Complaint   Patient presents with     Skin Check     Yoanna is here today for a skin check. She is concerned about a spot on her nose. She would also like to talk about her rosacea.          History of Present Illness:  Ms. Barby Jensen is a 60 year old female who with no history of skin cancer presents for total body skin exam.    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  She does not follow with dermatology here.  She has a history of rosacea and has been on metronidazole gel and sulfacetamide for many years.  She is presenting today for 2 reasons.  First she left a total body skin exam.  Second, during zoom video calls, she has noticed that she has a linear red line down the front of her nose.  Otherwise, she has no family history of skin cancer and no personal history of skin cancer.  [place] and gets a lot of sun in summer.  She uses sunblock.  She has no new concerning moles that she has noticed.  She does have a hyperpigmented mole in her groin.  Is been present for many years has been unchanged.    Past Medical History:   Patient Active Problem List   Diagnosis     CARDIOVASCULAR SCREENING; LDL GOAL LESS THAN 130     DEAN (generalized anxiety disorder)     Rosacea     Chronic pain of both knees     Hot flashes     Arthritis of right knee     Status post knee surgery     Abnormal gait     Past Medical History:   Diagnosis Date     Acne rosacea      ASCUS on Pap smear 08/2006     DEAN (generalized anxiety disorder)      Reactive airway disease      Past Surgical History:   Procedure Laterality Date     ARTHROPLASTY KNEE UNICOMPARTMENT BILATERAL Bilateral 10/16/2020    Procedure: Bilateral Knee Unicompartment Arthroplasty;  Surgeon: Doug Senior MD;  [facility]     ARTHROSCOPY KNEE BILATERAL Bilateral 09/04/2015    Procedure: ARTHROSCOPY KNEE BILATERAL;  Surgeon: Tracie Stewart MD;  [facility]     COLONOSCOPY       EXCISE MASS UPPER EXTREMITY  10/2009    right forearm lipoma     INJECT EPIDURAL CERVICAL / THORACIC SINGLE N/A 03/07/2018    Procedure: INJECT EPIDURAL CERVICAL / THORACIC SINGLE;  Cervical Epidural Steroid Injectioncervical 7, throasic 1;  Surgeon: Levi Conteh MD;  [facility]     KNEE SURGERY  09/2015    Bilateral meniscectomies       Social History:  Patient reports that she has never smoked. She has never used smokeless tobacco. She reports current alcohol use. She reports that she does not use drugs.    Family History:  Family History   Problem Relation Age of Onset     Gastrointestinal Disease Mother      Hearing Loss Mother      Hypertension Mother      Heart Failure Mother      Osteopenia Mother      Atrial fibrillation Mother         pacemaker     Pacemaker Mother      Hearing Loss Father      Prostate Cancer Father      Eye Disorder Father         macular degeneration     Osteopenia Father         after tx for prostate cancer     Osteoarthritis Father 72        knee replacement     Macular Degeneration Father      Depression Paternal Grandmother      Osteoarthritis Brother 62        knee replacement     Atrial fibrillation Brother      Melanoma No family hx of      Skin Cancer No family hx of        Medications:  Current Outpatient Medications   Medication Sig Dispense Refill     acetaminophen (TYLENOL) 325 MG tablet Take 2 tablets (650 mg) by mouth every 4 hours 100 tablet 0     aspirin 81 MG EC tablet Take 2 tablets (162 mg) by mouth daily 60 tablet 0     calcium carbonate-vitamin D (OS-GISELA) 500-400 MG-UNIT tablet Take 1 tablet by mouth every evening       diclofenac (VOLTAREN) 50 MG EC tablet Take 1 tablet (50 mg) by mouth 3 times daily 90 tablet 3     escitalopram (LEXAPRO) 10 MG tablet Take 1 tablet (10 mg) by mouth  daily (Patient taking differently: Take 10 mg by mouth every morning ) 90 tablet 3     gabapentin (NEURONTIN) 100 MG capsule Take 1 capsule (100 mg) by mouth 3 times daily as needed (If the patient is reporting neuropathic or nerve pain.) 10 capsule 0     Glucosamine-Chondroit-Vit C-Mn (GLUCOSAMINE 1500 COMPLEX PO) Take by mouth every evening        Multiple Vitamins-Minerals (PRESERVISION AREDS) CAPS Take one daily (Patient taking differently: every evening Take one daily) 90 capsule 3     norethindrone-ethinyl estradiol (FEMHRT 1/5) 1-5 MG-MCG tablet Take 1 tablet by mouth daily (Patient taking differently: Take 1 tablet by mouth every morning ) 84 tablet 3     ondansetron (ZOFRAN-ODT) 4 MG ODT tab Take 1 tablet (4 mg) by mouth every 6 hours as needed for nausea or vomiting 10 tablet 0     sulfacetamide sodium, Acne, (KLARON) 10 % lotion Apply once at night - needs clinic visit (Patient taking differently: Apply topically every morning Apply once at night - needs clinic visit) 118 mL 0     traMADol (ULTRAM) 50 MG tablet Take 1 tablet (50 mg) by mouth every 8 hours as needed for severe pain 20 tablet 0     traMADol (ULTRAM) 50 MG tablet Take 1 tablet (50 mg) by mouth every 6 hours as needed for severe pain 20 tablet 0     oxyCODONE (ROXICODONE) 5 MG tablet Take 1-2 tablets (5-10 mg) by mouth every 4 hours as needed for pain (Moderate to Severe) (Patient not taking: Reported on 10/29/2020) 30 tablet 0     senna-docusate (SENOKOT-S/PERICOLACE) 8.6-50 MG tablet Take 1-2 tablets by mouth 2 times daily Take while on oral narcotics to prevent or treat constipation. (Patient not taking: Reported on 10/29/2020) 30 tablet 0        No Known Allergies      Review of Systems:  -As per HPI  -Constitutional: Otherwise feeling well today, in usual state of health.  -Skin: As above in HPI. No additional skin concerns.    Physical exam:  Vitals: There were no vitals taken for this visit.  GEN: This is a well developed,  well-nourished female in no acute distress, in a pleasant mood.    SKIN: Total skin excluding the undergarment areas was performed. The exam included the head/face, neck, both arms, chest, back, abdomen, both legs, digits and/or nails.   -1 cm seb keratosis over right pubic bone  -There are erythematous papules and scattered telangectasias on the bilateral cheeks and nose. There is a linear-appearing collection of telangiectasias down the front of the nose.   -Brown macules on sun exposed areas  -Brown macules and papules on trunk and extremities without concerning dermoscopy features.   -No other lesions of concern on areas examined.     Labs:  None today    Impression/Plan:    1. Acne rosacea, papulopustular and erythrotelangectatic type  - Diligent photoprotection; avoidance of triggers discussed  - Continue metronidazole 1.0% gel and sulfacetamide 10% lotion (refills provided).  -We discussed laser therapy as a means to improve her rosacea.  Suspect that the linear line on her nose that she is seen on some calls is simply positional it is more apparent on the video.  Patient elected not to pursue laser treatment at this time which we will consider    2. Benign lesions: Multiple benign nevi, solar lentigos, seborrheic keratoses. Explained to patient benign nature of lesion. No treatment is necessary at this time unless the lesion changes or becomes symptomatic.     - ABCDs of melanoma were discussed and self skin checks were advised.  - Sun precaution was advised including the use of sun screens of SPF 30 or higher, sun protective clothing, and avoidance of tanning beds.    Follow-up in 1 year, earlier for new or changing lesions.      staffed the patient.    Staff Involved:  Resident(Lloyd Bloom, PGY3, IM)/Staff Dr. Meredith    Staff Physician Comments:   I saw and evaluated the patient with the resident and I agree with the assessment and plan.  I was present for the examination.    Fred Meredith,  MD  Pronouns: he/him/his    Department of Dermatology  [facility]: Phone: [phone], Fax:[phone]  [facility]: Phone: [phone] Fax: [phone]

## 2020-11-24 DIAGNOSIS — Z47.1 AFTERCARE FOLLOWING BILATERAL KNEE JOINT REPLACEMENT SURGERY: Primary | ICD-10-CM

## 2020-11-24 DIAGNOSIS — Z96.653 AFTERCARE FOLLOWING BILATERAL KNEE JOINT REPLACEMENT SURGERY: Primary | ICD-10-CM

## 2020-12-01 ENCOUNTER — TELEPHONE (OUTPATIENT)
Dept: ORTHOPEDICS | Facility: CLINIC | Age: 60
End: 2020-12-01

## 2020-12-01 DIAGNOSIS — Z47.1 AFTERCARE FOLLOWING BILATERAL KNEE JOINT REPLACEMENT SURGERY: ICD-10-CM

## 2020-12-01 DIAGNOSIS — Z96.653 AFTERCARE FOLLOWING BILATERAL KNEE JOINT REPLACEMENT SURGERY: ICD-10-CM

## 2020-12-01 RX ORDER — TRAMADOL HYDROCHLORIDE 50 MG/1
50 TABLET ORAL EVERY 8 HOURS PRN
Qty: 20 TABLET | Refills: 0
Start: 2020-12-01

## 2020-12-01 NOTE — TELEPHONE ENCOUNTER
Yoanna underwent bilateral medial uni knee arthroplasties on 10/16/20, she left voicemail requesting Tramadol refill which was renewed per standing orders.  Zenia Ravi RN

## 2020-12-02 ENCOUNTER — THERAPY VISIT (OUTPATIENT)
Dept: PHYSICAL THERAPY | Facility: CLINIC | Age: 60
End: 2020-12-02
Payer: COMMERCIAL

## 2020-12-02 DIAGNOSIS — R26.9 ABNORMAL GAIT: ICD-10-CM

## 2020-12-02 PROCEDURE — 97110 THERAPEUTIC EXERCISES: CPT | Mod: GP | Performed by: PHYSICAL THERAPIST

## 2020-12-03 ENCOUNTER — ANCILLARY PROCEDURE (OUTPATIENT)
Dept: GENERAL RADIOLOGY | Facility: CLINIC | Age: 60
End: 2020-12-03
Attending: NURSE PRACTITIONER
Payer: COMMERCIAL

## 2020-12-03 DIAGNOSIS — Z47.1 AFTERCARE FOLLOWING BILATERAL KNEE JOINT REPLACEMENT SURGERY: ICD-10-CM

## 2020-12-03 DIAGNOSIS — Z96.653 AFTERCARE FOLLOWING BILATERAL KNEE JOINT REPLACEMENT SURGERY: ICD-10-CM

## 2020-12-03 PROCEDURE — 73560 X-RAY EXAM OF KNEE 1 OR 2: CPT | Mod: RT | Performed by: RADIOLOGY

## 2020-12-22 ENCOUNTER — MYC MEDICAL ADVICE (OUTPATIENT)
Dept: ORTHOPEDICS | Facility: CLINIC | Age: 60
End: 2020-12-22

## 2021-01-11 ENCOUNTER — TELEPHONE (OUTPATIENT)
Dept: ORTHOPEDICS | Facility: CLINIC | Age: 61
End: 2021-01-11

## 2021-01-11 NOTE — TELEPHONE ENCOUNTER
[facility] Call Center    Phone Message    May a detailed message be left on voicemail: yes     Reason for Call: Other: Message is for Zenia - patient is wondering if she can return to work with no restrictions.     Action Taken: Message routed to:  [facility] ([facility]): Orthopedic    Travel Screening: Not Applicable

## 2021-01-11 NOTE — TELEPHONE ENCOUNTER
Yoanna was phoned back by GLORIA.  She states she is doing well s/p bilateral knee uni arthroplasties done 10/16/20.  She has no swelling, is off prescription pain medications, is able to cross country ski a couple miles without knee pain, and she is going back to work casually, asking for return to work slip without restrictions.  Yoanna's last xrays were taken 12/3/20 showing solid implant.  Letter for work was mailed to pt's home.  Zenia Ravi RN

## 2021-01-11 NOTE — LETTER
Return to Work  2021     Seen today: no    Patient:  Barby Jensen  :   1960  MRN:     [id number]  Physician: ANA SENIOR        To Whom it May Concern:    Barby Jensen may return to work on 2021 with no restrictions.          Electronically signed by Ana Senior MD

## 2021-01-14 PROBLEM — R26.9 ABNORMAL GAIT: Status: RESOLVED | Noted: 2020-10-19 | Resolved: 2021-01-14

## 2021-01-14 NOTE — PROGRESS NOTES
Discharge Note    Progress reporting period is from initial evaluation date (please see noted date below) to Dec 2, 2020.  Linked Episodes   Type: Episode: Status: Noted: Resolved: Last update: Updated by:   PHYSICAL THERAPY s/p bilateral knee Active 10/19/2020  12/2/2020  1:07 PM Dawit Felipe, JUAN A      Comments:       Barby failed to follow up and current status is unknown.  Please see information below for last relevant information on current status.  Patient seen for 7 visits.    SUBJECTIVE  Subjective changes noted by patient:  Patient reports phone call with MD's office went well. She will have x-ray tomorrow. Went back to work yesterday which went fine. Increased pain and stiffness with prolonged sitting. Last day of work is Friday. Still some left knee pain and catching sensation when going down stairs. Still trying to go reciprocally down stairs.  .  Current pain level is (3-4/10).     Previous pain level was  5/10.   Changes in function:  Yes (See Goal flowsheet attached for changes in current functional level)  Adverse reaction to treatment or activity: None    OBJECTIVE  Changes noted in objective findings: left knee AROM: 0-0-134, right knee AROM: 0-3-127     ASSESSMENT/PLAN  Diagnosis: Bilateral medial unicompartmental knee arthroplasty 10/16/20   Updated problem list and treatment plan:     STG/LTGs have been met or progress has been made towards goals:  Yes, please see goal flowsheet for most current information  Assessment of Progress: current status is unknown.    Last current status: Pt is progressing as expected   Self Management Plans:  HEP  I have re-evaluated this patient and find that the nature, scope, duration and intensity of the therapy is appropriate for the medical condition of the patient.  Barby continues to require the following intervention to meet STG and LTG's:  HEP.    Recommendations:  Discharge with current home program.  Patient to follow up with MD as needed.    Please  refer to the daily flowsheet for treatment today, total treatment time and time spent performing 1:1 timed codes.

## 2021-04-30 DIAGNOSIS — Z96.653 AFTERCARE FOLLOWING BILATERAL KNEE JOINT REPLACEMENT SURGERY: ICD-10-CM

## 2021-04-30 DIAGNOSIS — Z47.1 AFTERCARE FOLLOWING BILATERAL KNEE JOINT REPLACEMENT SURGERY: ICD-10-CM

## 2021-04-30 NOTE — TELEPHONE ENCOUNTER
Fax was received for Diclofenac.  Pt is s/p bilateral uni knee arthroplasties 10/16/20 with Dr Senior.  Refill was authorized with further refills to come from pt's PCP or pt have follow up orthopedic care with Yady Richey CNP at [facility] [phone].  Pt was phoned and vm was left with the above recommendation.  Zenia Ravi RN

## 2021-09-18 ENCOUNTER — HEALTH MAINTENANCE LETTER (OUTPATIENT)
Age: 61
End: 2021-09-18

## 2022-01-08 ENCOUNTER — HEALTH MAINTENANCE LETTER (OUTPATIENT)
Age: 62
End: 2022-01-08

## 2022-09-09 NOTE — ADDENDUM NOTE
Addendum  created 10/16/20 2325 by Brynn Whitt MD    Intraprocedure Event edited      
Medication adjustment

## 2022-11-19 ENCOUNTER — HEALTH MAINTENANCE LETTER (OUTPATIENT)
Age: 62
End: 2022-11-19

## 2022-12-18 ENCOUNTER — HEALTH MAINTENANCE LETTER (OUTPATIENT)
Age: 62
End: 2022-12-18

## 2023-04-09 ENCOUNTER — HEALTH MAINTENANCE LETTER (OUTPATIENT)
Age: 63
End: 2023-04-09

## 2023-08-17 NOTE — LETTER
July 6, 2017    RE:Barby Jensen  [address] [address]    1960            Dear Ms. Jensen      To Whom It May Concern:    Barby Jensen is under my professional care for R plantar fasciitis.   She  may return to work next shift with the following evaluation on 7/6/17  I have recommended a cam boot as needed for 1 week with insert through 7/13/17.  She can remove the boot after a week.   I have recommended avoiding painful activities that aggravate this sitting 20 minutes in a hour as needed. Through 8/6/17.      Sincerely,      Denny Stanley MD         1.83

## 2025-05-06 ENCOUNTER — ANCILLARY PROCEDURE (OUTPATIENT)
Dept: CARDIOLOGY | Facility: CLINIC | Age: 65
End: 2025-05-06
Attending: FAMILY MEDICINE
Payer: COMMERCIAL

## 2025-05-06 DIAGNOSIS — R60.0 PERIPHERAL EDEMA: ICD-10-CM

## 2025-05-06 LAB — LVEF ECHO: NORMAL

## 2025-05-06 PROCEDURE — 93306 TTE W/DOPPLER COMPLETE: CPT | Performed by: INTERNAL MEDICINE

## 2025-05-10 ENCOUNTER — HEALTH MAINTENANCE LETTER (OUTPATIENT)
Age: 65
End: 2025-05-10

## (undated) DEVICE — PREP DURAPREP 26ML APL 8630

## (undated) DEVICE — SYR 10ML FINGER CONTROL W/O NDL 309695

## (undated) DEVICE — STRAP KNEE/BODY 31143004

## (undated) DEVICE — DRSG GAUZE 4X8" NON21842

## (undated) DEVICE — PEN MARKING SKIN W/LABELS 31145918

## (undated) DEVICE — SU VICRYL 2-0 CT-2 27" UND J269H

## (undated) DEVICE — BLADE KNIFE SURG 10 371110

## (undated) DEVICE — BONE CEMENT MIXEVAC III HI VAC KIT  0206-015-000

## (undated) DEVICE — BLADE SAW SAGITTAL STRK 18X90X1.27MM HD SYS 6 6118-127-090

## (undated) DEVICE — SUCTION MANIFOLD DORNOCH ULTRA CART UL-CL500

## (undated) DEVICE — SU VICRYL 0 CT-1 3X27" J430T

## (undated) DEVICE — DRSG TEGADERM ALGINATE AG 4X5" 90303

## (undated) DEVICE — DRAPE TIBURON TOP SHEET 100X60" 29352

## (undated) DEVICE — GLOVE PROTEXIS BLUE W/NEU-THERA 8.0  2D73EB80

## (undated) DEVICE — DRSG STERI STRIP 1X5" R1548

## (undated) DEVICE — LINEN GOWN X4 5410

## (undated) DEVICE — SPECIMEN CONTAINER 5OZ STERILE 2600SA

## (undated) DEVICE — PAIN PACK

## (undated) DEVICE — DRSG KERLIX FLUFFS X5

## (undated) DEVICE — SU VICRYL 2-0 CT-1 27" UND J259H

## (undated) DEVICE — GOWN XLG DISP 9545

## (undated) DEVICE — BLADE SAW RECIP STRK 77.5X11X1.23MM 0277-096-326

## (undated) DEVICE — BONE CLEANING TIP INTERPULSE  0210-010-000

## (undated) DEVICE — SYR 10ML PERFIX LL 332152

## (undated) DEVICE — SOL ISOPROPYL RUBBING ALCOHOL USP 70% 4OZ HDX-20 I0020

## (undated) DEVICE — LINEN BACK PACK 5440

## (undated) DEVICE — GLOVE PROTEXIS W/NEU-THERA 7.5  2D73TE75

## (undated) DEVICE — CAST PADDING 6" STERILE 9046S

## (undated) DEVICE — BASIN SET MAJOR

## (undated) DEVICE — ESU GROUND PAD UNIVERSAL W/O CORD

## (undated) DEVICE — DRSG TEGADERM 4X4 3/4" 1626W

## (undated) DEVICE — SU MONOCRYL 3-0 PS-1 27" Y936H

## (undated) DEVICE — Device

## (undated) DEVICE — DRAPE STOCKINETTE IMPERVIOUS 12" 1587

## (undated) DEVICE — SUCTION IRR SYSTEM W/O TIP INTERPULSE HANDPIECE 0210-100-000

## (undated) DEVICE — TOURNIQUET CUFF 30" REPRO BLUE 60-7070-105

## (undated) DEVICE — HOOD FLYTE W/PEELAWAY 408-800-100

## (undated) DEVICE — SU MONOCRYL 4-0 PS-2 18" UND Y496G

## (undated) DEVICE — DRSG ABDOMINAL 07 1/2X8" 7197D

## (undated) DEVICE — SU ETHIBOND 1 CT-1 30" X425H

## (undated) DEVICE — DRSG ADAPTIC 3X8" 6113

## (undated) DEVICE — DRAPE EXTREMITY BILAT

## (undated) DEVICE — SOL NACL 0.9% IRRIG 3000ML BAG 2B7477

## (undated) DEVICE — SOL WATER IRRIG 1000ML BOTTLE 2F7114

## (undated) DEVICE — PREP CHLORAPREP W/ORANGE TINT 10.5ML 260715

## (undated) DEVICE — SU VICRYL 1 CT-1 27" UND J261H

## (undated) DEVICE — GLOVE PROTEXIS POWDER FREE SMT 8.0  2D72PT80X

## (undated) DEVICE — NDL 22GA 1.5"

## (undated) DEVICE — SU VICRYL 1 CT-1 36" UND J947H

## (undated) DEVICE — LINEN TOWEL PACK X5 5464

## (undated) DEVICE — DRAPE IOBAN INCISE 23X17" 6650EZ

## (undated) DEVICE — SOL NACL 0.9% IRRIG 1000ML BOTTLE 2F7124

## (undated) RX ORDER — TRANEXAMIC ACID 650 MG/1
TABLET ORAL
Status: DISPENSED
Start: 2020-10-16

## (undated) RX ORDER — CELECOXIB 200 MG/1
CAPSULE ORAL
Status: DISPENSED
Start: 2020-10-16

## (undated) RX ORDER — KETOROLAC TROMETHAMINE 30 MG/ML
INJECTION, SOLUTION INTRAMUSCULAR; INTRAVENOUS
Status: DISPENSED
Start: 2020-10-16

## (undated) RX ORDER — GABAPENTIN 300 MG/1
CAPSULE ORAL
Status: DISPENSED
Start: 2020-10-16

## (undated) RX ORDER — CEFAZOLIN SODIUM 2 G/100ML
INJECTION, SOLUTION INTRAVENOUS
Status: DISPENSED
Start: 2020-10-16

## (undated) RX ORDER — FENTANYL CITRATE 50 UG/ML
INJECTION, SOLUTION INTRAMUSCULAR; INTRAVENOUS
Status: DISPENSED
Start: 2020-10-16

## (undated) RX ORDER — OXYCODONE HYDROCHLORIDE 5 MG/1
TABLET ORAL
Status: DISPENSED
Start: 2020-10-16

## (undated) RX ORDER — EPHEDRINE SULFATE 50 MG/ML
INJECTION, SOLUTION INTRAMUSCULAR; INTRAVENOUS; SUBCUTANEOUS
Status: DISPENSED
Start: 2020-10-16

## (undated) RX ORDER — DEXAMETHASONE SODIUM PHOSPHATE 4 MG/ML
INJECTION, SOLUTION INTRA-ARTICULAR; INTRALESIONAL; INTRAMUSCULAR; INTRAVENOUS; SOFT TISSUE
Status: DISPENSED
Start: 2017-11-08

## (undated) RX ORDER — LIDOCAINE HYDROCHLORIDE 10 MG/ML
INJECTION, SOLUTION INFILTRATION; PERINEURAL
Status: DISPENSED
Start: 2017-11-08

## (undated) RX ORDER — BETAMETHASONE SODIUM PHOSPHATE AND BETAMETHASONE ACETATE 3; 3 MG/ML; MG/ML
INJECTION, SUSPENSION INTRA-ARTICULAR; INTRALESIONAL; INTRAMUSCULAR; SOFT TISSUE
Status: DISPENSED
Start: 2018-03-07

## (undated) RX ORDER — TRIAMCINOLONE ACETONIDE 40 MG/ML
INJECTION, SUSPENSION INTRA-ARTICULAR; INTRAMUSCULAR
Status: DISPENSED
Start: 2019-05-21

## (undated) RX ORDER — LIDOCAINE HYDROCHLORIDE 10 MG/ML
INJECTION, SOLUTION INFILTRATION; PERINEURAL
Status: DISPENSED
Start: 2019-05-21

## (undated) RX ORDER — BUPIVACAINE HYDROCHLORIDE AND EPINEPHRINE 5; 5 MG/ML; UG/ML
INJECTION, SOLUTION PERINEURAL
Status: DISPENSED
Start: 2020-10-16

## (undated) RX ORDER — ACETAMINOPHEN 325 MG/1
TABLET ORAL
Status: DISPENSED
Start: 2020-10-16

## (undated) RX ORDER — HYDROMORPHONE HYDROCHLORIDE 1 MG/ML
INJECTION, SOLUTION INTRAMUSCULAR; INTRAVENOUS; SUBCUTANEOUS
Status: DISPENSED
Start: 2020-10-16

## (undated) RX ORDER — TRIAMCINOLONE ACETONIDE 40 MG/ML
INJECTION, SUSPENSION INTRA-ARTICULAR; INTRAMUSCULAR
Status: DISPENSED
Start: 2020-05-05

## (undated) RX ORDER — LIDOCAINE HYDROCHLORIDE 10 MG/ML
INJECTION, SOLUTION INFILTRATION; PERINEURAL
Status: DISPENSED
Start: 2020-05-05

## (undated) RX ORDER — TRIAMCINOLONE ACETONIDE 40 MG/ML
INJECTION, SUSPENSION INTRA-ARTICULAR; INTRAMUSCULAR
Status: DISPENSED
Start: 2017-11-08